# Patient Record
Sex: MALE | Race: WHITE | Employment: OTHER | URBAN - METROPOLITAN AREA
[De-identification: names, ages, dates, MRNs, and addresses within clinical notes are randomized per-mention and may not be internally consistent; named-entity substitution may affect disease eponyms.]

---

## 2017-01-23 RX ORDER — TIZANIDINE 4 MG/1
4 TABLET ORAL EVERY 8 HOURS PRN
Qty: 30 TABLET | Refills: 1 | Status: SHIPPED | OUTPATIENT
Start: 2017-01-23 | End: 2017-05-25 | Stop reason: SDUPTHER

## 2017-04-03 RX ORDER — MELOXICAM 15 MG/1
15 TABLET ORAL DAILY
Qty: 30 TABLET | Refills: 3 | Status: SHIPPED | OUTPATIENT
Start: 2017-04-03 | End: 2017-05-25 | Stop reason: SDUPTHER

## 2017-05-25 ENCOUNTER — OFFICE VISIT (OUTPATIENT)
Dept: FAMILY MEDICINE CLINIC | Age: 52
End: 2017-05-25

## 2017-05-25 VITALS
RESPIRATION RATE: 16 BRPM | TEMPERATURE: 98.6 F | HEIGHT: 69 IN | DIASTOLIC BLOOD PRESSURE: 78 MMHG | HEART RATE: 72 BPM | BODY MASS INDEX: 32.29 KG/M2 | WEIGHT: 218 LBS | SYSTOLIC BLOOD PRESSURE: 140 MMHG

## 2017-05-25 DIAGNOSIS — L50.9 URTICARIA: ICD-10-CM

## 2017-05-25 DIAGNOSIS — M25.562 CHRONIC PAIN OF LEFT KNEE: ICD-10-CM

## 2017-05-25 DIAGNOSIS — S83.206S: ICD-10-CM

## 2017-05-25 DIAGNOSIS — Z01.818 PREOP EXAMINATION: Primary | ICD-10-CM

## 2017-05-25 DIAGNOSIS — K21.9 GASTROESOPHAGEAL REFLUX DISEASE, ESOPHAGITIS PRESENCE NOT SPECIFIED: ICD-10-CM

## 2017-05-25 DIAGNOSIS — E78.2 MIXED HYPERLIPIDEMIA: Chronic | ICD-10-CM

## 2017-05-25 DIAGNOSIS — M54.2 CERVICAL PAIN (NECK): ICD-10-CM

## 2017-05-25 DIAGNOSIS — J30.2 SEASONAL ALLERGIC RHINITIS, UNSPECIFIED ALLERGIC RHINITIS TRIGGER: ICD-10-CM

## 2017-05-25 DIAGNOSIS — I10 ESSENTIAL HYPERTENSION: Chronic | ICD-10-CM

## 2017-05-25 DIAGNOSIS — G89.29 CHRONIC PAIN OF LEFT KNEE: ICD-10-CM

## 2017-05-25 PROCEDURE — 99214 OFFICE O/P EST MOD 30 MIN: CPT | Performed by: NURSE PRACTITIONER

## 2017-05-25 RX ORDER — METHYLPREDNISOLONE ACETATE 80 MG/ML
120 INJECTION, SUSPENSION INTRA-ARTICULAR; INTRALESIONAL; INTRAMUSCULAR; SOFT TISSUE ONCE
Status: COMPLETED | OUTPATIENT
Start: 2017-05-25 | End: 2017-05-25

## 2017-05-25 RX ORDER — CETIRIZINE HYDROCHLORIDE 10 MG/1
10 TABLET ORAL DAILY
Qty: 30 TABLET | Refills: 3 | Status: SHIPPED | OUTPATIENT
Start: 2017-05-25 | End: 2019-03-25 | Stop reason: SDUPTHER

## 2017-05-25 RX ORDER — MELOXICAM 15 MG/1
15 TABLET ORAL DAILY
Qty: 90 TABLET | Refills: 3 | Status: ON HOLD | OUTPATIENT
Start: 2017-05-25 | End: 2018-06-17 | Stop reason: HOSPADM

## 2017-05-25 RX ORDER — OMEPRAZOLE 40 MG/1
40 CAPSULE, DELAYED RELEASE ORAL DAILY
Qty: 90 CAPSULE | Refills: 3 | Status: SHIPPED | OUTPATIENT
Start: 2017-05-25 | End: 2019-04-08 | Stop reason: SDUPTHER

## 2017-05-25 RX ORDER — LOSARTAN POTASSIUM 100 MG/1
TABLET ORAL
Qty: 90 TABLET | Refills: 3 | Status: SHIPPED | OUTPATIENT
Start: 2017-05-25 | End: 2018-06-07 | Stop reason: SDUPTHER

## 2017-05-25 RX ORDER — TIZANIDINE 4 MG/1
4 TABLET ORAL EVERY 8 HOURS PRN
Qty: 60 TABLET | Refills: 1 | Status: SHIPPED | OUTPATIENT
Start: 2017-05-25 | End: 2017-08-14

## 2017-05-25 RX ADMIN — METHYLPREDNISOLONE ACETATE 120 MG: 80 INJECTION, SUSPENSION INTRA-ARTICULAR; INTRALESIONAL; INTRAMUSCULAR; SOFT TISSUE at 10:46

## 2017-05-25 ASSESSMENT — PATIENT HEALTH QUESTIONNAIRE - PHQ9
2. FEELING DOWN, DEPRESSED OR HOPELESS: 0
SUM OF ALL RESPONSES TO PHQ QUESTIONS 1-9: 0
SUM OF ALL RESPONSES TO PHQ9 QUESTIONS 1 & 2: 0
1. LITTLE INTEREST OR PLEASURE IN DOING THINGS: 0

## 2017-06-02 ASSESSMENT — ENCOUNTER SYMPTOMS
RHINORRHEA: 1
SORE THROAT: 1
GASTROINTESTINAL NEGATIVE: 1
RESPIRATORY NEGATIVE: 1
EYE ITCHING: 1

## 2017-06-20 ENCOUNTER — TELEPHONE (OUTPATIENT)
Dept: FAMILY MEDICINE CLINIC | Age: 52
End: 2017-06-20

## 2017-06-20 RX ORDER — METHYLPREDNISOLONE 4 MG/1
TABLET ORAL
Qty: 1 KIT | Refills: 0 | Status: SHIPPED | OUTPATIENT
Start: 2017-06-20 | End: 2017-06-20 | Stop reason: SDUPTHER

## 2017-06-20 RX ORDER — METHYLPREDNISOLONE 4 MG/1
TABLET ORAL
Qty: 1 KIT | Refills: 0 | Status: SHIPPED | OUTPATIENT
Start: 2017-06-20 | End: 2017-06-26

## 2017-07-05 ENCOUNTER — OFFICE VISIT (OUTPATIENT)
Dept: FAMILY MEDICINE CLINIC | Age: 52
End: 2017-07-05

## 2017-07-05 VITALS
HEART RATE: 76 BPM | SYSTOLIC BLOOD PRESSURE: 140 MMHG | DIASTOLIC BLOOD PRESSURE: 78 MMHG | WEIGHT: 223 LBS | RESPIRATION RATE: 16 BRPM | BODY MASS INDEX: 32 KG/M2

## 2017-07-05 DIAGNOSIS — M54.16 LUMBAR RADICULOPATHY: Primary | ICD-10-CM

## 2017-07-05 DIAGNOSIS — M54.31 SCIATICA, RIGHT SIDE: ICD-10-CM

## 2017-07-05 PROCEDURE — 99213 OFFICE O/P EST LOW 20 MIN: CPT | Performed by: NURSE PRACTITIONER

## 2017-07-05 RX ORDER — KETOROLAC TROMETHAMINE 30 MG/ML
60 INJECTION, SOLUTION INTRAMUSCULAR; INTRAVENOUS ONCE
Status: COMPLETED | OUTPATIENT
Start: 2017-07-05 | End: 2017-07-05

## 2017-07-05 RX ORDER — METHYLPREDNISOLONE ACETATE 80 MG/ML
80 INJECTION, SUSPENSION INTRA-ARTICULAR; INTRALESIONAL; INTRAMUSCULAR; SOFT TISSUE ONCE
Status: COMPLETED | OUTPATIENT
Start: 2017-07-05 | End: 2017-07-05

## 2017-07-05 RX ADMIN — KETOROLAC TROMETHAMINE 60 MG: 30 INJECTION, SOLUTION INTRAMUSCULAR; INTRAVENOUS at 11:45

## 2017-07-05 RX ADMIN — METHYLPREDNISOLONE ACETATE 80 MG: 80 INJECTION, SUSPENSION INTRA-ARTICULAR; INTRALESIONAL; INTRAMUSCULAR; SOFT TISSUE at 11:45

## 2017-07-06 ENCOUNTER — TELEPHONE (OUTPATIENT)
Dept: FAMILY MEDICINE CLINIC | Age: 52
End: 2017-07-06

## 2017-07-06 RX ORDER — TRAMADOL HYDROCHLORIDE 50 MG/1
50 TABLET ORAL EVERY 6 HOURS PRN
Qty: 60 TABLET | Refills: 0 | Status: SHIPPED | OUTPATIENT
Start: 2017-07-06 | End: 2017-07-19 | Stop reason: SDUPTHER

## 2017-07-06 ASSESSMENT — ENCOUNTER SYMPTOMS
RESPIRATORY NEGATIVE: 1
BACK PAIN: 1
GASTROINTESTINAL NEGATIVE: 1
EYES NEGATIVE: 1

## 2017-07-19 ENCOUNTER — TELEPHONE (OUTPATIENT)
Dept: FAMILY MEDICINE CLINIC | Age: 52
End: 2017-07-19

## 2017-07-20 RX ORDER — TRAMADOL HYDROCHLORIDE 50 MG/1
50 TABLET ORAL EVERY 6 HOURS PRN
Qty: 60 TABLET | Refills: 0 | Status: SHIPPED | OUTPATIENT
Start: 2017-07-20 | End: 2017-08-11 | Stop reason: SDUPTHER

## 2017-07-21 ENCOUNTER — PROCEDURE VISIT (OUTPATIENT)
Dept: PHYSICAL MEDICINE AND REHAB | Age: 52
End: 2017-07-21
Payer: COMMERCIAL

## 2017-07-21 DIAGNOSIS — M54.9 BACK PAIN WITH SCIATICA: ICD-10-CM

## 2017-07-21 DIAGNOSIS — M54.30 BACK PAIN WITH SCIATICA: ICD-10-CM

## 2017-07-21 DIAGNOSIS — M54.16 LUMBAR RADICULOPATHY: Primary | ICD-10-CM

## 2017-07-21 DIAGNOSIS — R20.0 BILATERAL LEG NUMBNESS: ICD-10-CM

## 2017-07-21 PROCEDURE — 95910 NRV CNDJ TEST 7-8 STUDIES: CPT | Performed by: PSYCHIATRY & NEUROLOGY

## 2017-07-21 PROCEDURE — 95886 MUSC TEST DONE W/N TEST COMP: CPT | Performed by: PSYCHIATRY & NEUROLOGY

## 2017-07-27 ENCOUNTER — HOSPITAL ENCOUNTER (EMERGENCY)
Age: 52
Discharge: HOME | End: 2017-07-27
Payer: COMMERCIAL

## 2017-07-27 VITALS
HEART RATE: 71 BPM | DIASTOLIC BLOOD PRESSURE: 89 MMHG | BODY MASS INDEX: 32.21 KG/M2 | SYSTOLIC BLOOD PRESSURE: 138 MMHG | RESPIRATION RATE: 16 BRPM | TEMPERATURE: 97.9 F | WEIGHT: 225 LBS | OXYGEN SATURATION: 97 % | HEIGHT: 70 IN

## 2017-07-27 DIAGNOSIS — M54.16 RADICULOPATHY OF LUMBAR REGION: Primary | ICD-10-CM

## 2017-07-27 PROCEDURE — 99212 OFFICE O/P EST SF 10 MIN: CPT

## 2017-07-27 PROCEDURE — 99213 OFFICE O/P EST LOW 20 MIN: CPT | Performed by: NURSE PRACTITIONER

## 2017-07-27 PROCEDURE — 6360000002 HC RX W HCPCS: Performed by: NURSE PRACTITIONER

## 2017-07-27 PROCEDURE — 96372 THER/PROPH/DIAG INJ SC/IM: CPT

## 2017-07-27 RX ORDER — PREDNISONE 10 MG/1
TABLET ORAL
Qty: 21 TABLET | Refills: 0 | Status: SHIPPED | OUTPATIENT
Start: 2017-07-27 | End: 2017-08-07 | Stop reason: ALTCHOICE

## 2017-07-27 RX ORDER — METHYLPREDNISOLONE SODIUM SUCCINATE 125 MG/2ML
125 INJECTION, POWDER, LYOPHILIZED, FOR SOLUTION INTRAMUSCULAR; INTRAVENOUS ONCE
Status: COMPLETED | OUTPATIENT
Start: 2017-07-27 | End: 2017-07-27

## 2017-07-27 RX ORDER — METHYLPREDNISOLONE ACETATE 80 MG/ML
120 INJECTION, SUSPENSION INTRA-ARTICULAR; INTRALESIONAL; INTRAMUSCULAR; SOFT TISSUE ONCE
Status: DISCONTINUED | OUTPATIENT
Start: 2017-07-27 | End: 2017-07-27

## 2017-07-27 RX ADMIN — METHYLPREDNISOLONE SODIUM SUCCINATE 125 MG: 125 INJECTION, POWDER, FOR SOLUTION INTRAMUSCULAR; INTRAVENOUS at 17:49

## 2017-07-27 ASSESSMENT — PAIN SCALES - GENERAL
PAINLEVEL_OUTOF10: 10
PAINLEVEL_OUTOF10: 8

## 2017-07-27 ASSESSMENT — PAIN DESCRIPTION - ORIENTATION: ORIENTATION: RIGHT;LOWER

## 2017-07-27 ASSESSMENT — PAIN DESCRIPTION - ONSET: ONSET: GRADUAL

## 2017-07-27 ASSESSMENT — PAIN DESCRIPTION - LOCATION: LOCATION: BACK

## 2017-07-27 ASSESSMENT — PAIN DESCRIPTION - FREQUENCY: FREQUENCY: CONTINUOUS

## 2017-07-27 ASSESSMENT — PAIN DESCRIPTION - DESCRIPTORS: DESCRIPTORS: SHARP;ACHING

## 2017-07-27 ASSESSMENT — PAIN DESCRIPTION - PROGRESSION: CLINICAL_PROGRESSION: GRADUALLY WORSENING

## 2017-07-27 ASSESSMENT — ENCOUNTER SYMPTOMS
BACK PAIN: 1
ABDOMINAL SWELLING: 0
BOWEL INCONTINENCE: 0
ABDOMINAL PAIN: 0

## 2017-07-27 ASSESSMENT — PAIN DESCRIPTION - PAIN TYPE: TYPE: ACUTE PAIN

## 2017-08-07 ENCOUNTER — HOSPITAL ENCOUNTER (EMERGENCY)
Age: 52
Discharge: HOME | End: 2017-08-07
Payer: COMMERCIAL

## 2017-08-07 VITALS
HEIGHT: 70 IN | HEART RATE: 72 BPM | WEIGHT: 230 LBS | BODY MASS INDEX: 32.93 KG/M2 | SYSTOLIC BLOOD PRESSURE: 153 MMHG | TEMPERATURE: 97.6 F | DIASTOLIC BLOOD PRESSURE: 95 MMHG | RESPIRATION RATE: 16 BRPM | OXYGEN SATURATION: 97 %

## 2017-08-07 DIAGNOSIS — M54.16 RIGHT LUMBAR RADICULOPATHY: Primary | ICD-10-CM

## 2017-08-07 PROCEDURE — 99212 OFFICE O/P EST SF 10 MIN: CPT

## 2017-08-07 PROCEDURE — 99213 OFFICE O/P EST LOW 20 MIN: CPT | Performed by: NURSE PRACTITIONER

## 2017-08-07 PROCEDURE — 96372 THER/PROPH/DIAG INJ SC/IM: CPT

## 2017-08-07 PROCEDURE — 6360000002 HC RX W HCPCS: Performed by: NURSE PRACTITIONER

## 2017-08-07 RX ORDER — METHYLPREDNISOLONE SODIUM SUCCINATE 125 MG/2ML
125 INJECTION, POWDER, LYOPHILIZED, FOR SOLUTION INTRAMUSCULAR; INTRAVENOUS ONCE
Status: COMPLETED | OUTPATIENT
Start: 2017-08-07 | End: 2017-08-07

## 2017-08-07 RX ADMIN — METHYLPREDNISOLONE SODIUM SUCCINATE 125 MG: 125 INJECTION, POWDER, FOR SOLUTION INTRAMUSCULAR; INTRAVENOUS at 15:54

## 2017-08-07 ASSESSMENT — ENCOUNTER SYMPTOMS
BACK PAIN: 1
COLOR CHANGE: 0
SHORTNESS OF BREATH: 0
VOMITING: 0

## 2017-08-07 ASSESSMENT — PAIN DESCRIPTION - PROGRESSION: CLINICAL_PROGRESSION: GRADUALLY WORSENING

## 2017-08-07 ASSESSMENT — PAIN DESCRIPTION - ORIENTATION: ORIENTATION: RIGHT

## 2017-08-07 ASSESSMENT — PAIN DESCRIPTION - PAIN TYPE: TYPE: ACUTE PAIN

## 2017-08-07 ASSESSMENT — PAIN DESCRIPTION - DESCRIPTORS: DESCRIPTORS: ACHING

## 2017-08-07 ASSESSMENT — PAIN SCALES - GENERAL: PAINLEVEL_OUTOF10: 8

## 2017-08-07 ASSESSMENT — PAIN DESCRIPTION - FREQUENCY: FREQUENCY: CONTINUOUS

## 2017-08-07 ASSESSMENT — PAIN DESCRIPTION - LOCATION: LOCATION: BACK;LEG

## 2017-08-11 RX ORDER — TRAMADOL HYDROCHLORIDE 50 MG/1
50 TABLET ORAL EVERY 6 HOURS PRN
Qty: 60 TABLET | Refills: 0 | Status: SHIPPED | OUTPATIENT
Start: 2017-08-11 | End: 2017-08-14

## 2017-08-14 ENCOUNTER — HOSPITAL ENCOUNTER (OUTPATIENT)
Dept: INTERVENTIONAL RADIOLOGY/VASCULAR | Age: 52
Discharge: HOME | End: 2017-08-14
Payer: COMMERCIAL

## 2017-08-14 VITALS
SYSTOLIC BLOOD PRESSURE: 153 MMHG | RESPIRATION RATE: 16 BRPM | HEART RATE: 70 BPM | OXYGEN SATURATION: 94 % | TEMPERATURE: 98.2 F | DIASTOLIC BLOOD PRESSURE: 115 MMHG

## 2017-08-14 PROCEDURE — 2500000003 HC RX 250 WO HCPCS

## 2017-08-14 PROCEDURE — 6360000004 HC RX CONTRAST MEDICATION: Performed by: RADIOLOGY

## 2017-08-14 PROCEDURE — 6360000002 HC RX W HCPCS

## 2017-08-14 PROCEDURE — 62323 NJX INTERLAMINAR LMBR/SAC: CPT

## 2017-08-14 RX ORDER — METHYLPREDNISOLONE ACETATE 80 MG/ML
80 INJECTION, SUSPENSION INTRA-ARTICULAR; INTRALESIONAL; INTRAMUSCULAR; SOFT TISSUE ONCE
Status: COMPLETED | OUTPATIENT
Start: 2017-08-14 | End: 2017-08-14

## 2017-08-14 RX ORDER — BUPIVACAINE HYDROCHLORIDE 2.5 MG/ML
5 INJECTION, SOLUTION EPIDURAL; INFILTRATION; INTRACAUDAL ONCE
Status: COMPLETED | OUTPATIENT
Start: 2017-08-14 | End: 2017-08-14

## 2017-08-14 RX ADMIN — BUPIVACAINE HYDROCHLORIDE 2 ML: 2.5 INJECTION, SOLUTION EPIDURAL; INFILTRATION; INTRACAUDAL at 15:11

## 2017-08-14 RX ADMIN — IOHEXOL 1 ML: 180 INJECTION INTRAVENOUS at 15:11

## 2017-08-14 RX ADMIN — METHYLPREDNISOLONE ACETATE 80 MG: 80 INJECTION, SUSPENSION INTRA-ARTICULAR; INTRALESIONAL; INTRAMUSCULAR; SOFT TISSUE at 15:12

## 2017-08-14 ASSESSMENT — PAIN DESCRIPTION - LOCATION: LOCATION: HIP;LEG;BUTTOCKS

## 2017-08-14 ASSESSMENT — PAIN DESCRIPTION - ORIENTATION
ORIENTATION: RIGHT

## 2017-08-14 ASSESSMENT — PAIN DESCRIPTION - DESCRIPTORS
DESCRIPTORS: ACHING
DESCRIPTORS: ACHING
DESCRIPTORS: THROBBING;SHARP
DESCRIPTORS: ACHING
DESCRIPTORS: THROBBING

## 2017-08-14 ASSESSMENT — PAIN SCALES - GENERAL
PAINLEVEL_OUTOF10: 10
PAINLEVEL_OUTOF10: 10
PAINLEVEL_OUTOF10: 8
PAINLEVEL_OUTOF10: 10
PAINLEVEL_OUTOF10: 10
PAINLEVEL_OUTOF10: 9

## 2017-08-14 ASSESSMENT — PAIN DESCRIPTION - FREQUENCY
FREQUENCY: CONTINUOUS

## 2017-08-14 ASSESSMENT — PAIN DESCRIPTION - PAIN TYPE
TYPE: CHRONIC PAIN

## 2017-09-11 RX ORDER — TRAMADOL HYDROCHLORIDE 50 MG/1
50 TABLET ORAL EVERY 6 HOURS PRN
Qty: 60 TABLET | Refills: 0 | OUTPATIENT
Start: 2017-09-11 | End: 2017-10-11

## 2017-09-13 ENCOUNTER — HOSPITAL ENCOUNTER (EMERGENCY)
Age: 52
Discharge: HOME | End: 2017-09-13
Attending: NURSE PRACTITIONER
Payer: COMMERCIAL

## 2017-09-13 VITALS
TEMPERATURE: 98.3 F | HEIGHT: 70 IN | SYSTOLIC BLOOD PRESSURE: 146 MMHG | BODY MASS INDEX: 30.78 KG/M2 | RESPIRATION RATE: 16 BRPM | DIASTOLIC BLOOD PRESSURE: 86 MMHG | WEIGHT: 215 LBS | HEART RATE: 96 BPM | OXYGEN SATURATION: 98 %

## 2017-09-13 DIAGNOSIS — M54.16 LUMBAR BACK PAIN WITH RADICULOPATHY AFFECTING RIGHT LOWER EXTREMITY: Primary | ICD-10-CM

## 2017-09-13 PROCEDURE — 6360000002 HC RX W HCPCS: Performed by: NURSE PRACTITIONER

## 2017-09-13 PROCEDURE — 96372 THER/PROPH/DIAG INJ SC/IM: CPT

## 2017-09-13 PROCEDURE — 99213 OFFICE O/P EST LOW 20 MIN: CPT | Performed by: NURSE PRACTITIONER

## 2017-09-13 PROCEDURE — 99212 OFFICE O/P EST SF 10 MIN: CPT

## 2017-09-13 RX ORDER — METHYLPREDNISOLONE ACETATE 80 MG/ML
80 INJECTION, SUSPENSION INTRA-ARTICULAR; INTRALESIONAL; INTRAMUSCULAR; SOFT TISSUE ONCE
Status: COMPLETED | OUTPATIENT
Start: 2017-09-13 | End: 2017-09-13

## 2017-09-13 RX ADMIN — METHYLPREDNISOLONE ACETATE 80 MG: 80 INJECTION, SUSPENSION INTRA-ARTICULAR; INTRALESIONAL; INTRAMUSCULAR; SOFT TISSUE at 17:38

## 2017-09-13 ASSESSMENT — PAIN DESCRIPTION - PROGRESSION: CLINICAL_PROGRESSION: GRADUALLY WORSENING

## 2017-09-13 ASSESSMENT — PAIN DESCRIPTION - DESCRIPTORS: DESCRIPTORS: ACHING;BURNING

## 2017-09-13 ASSESSMENT — PAIN DESCRIPTION - ORIENTATION: ORIENTATION: RIGHT;LOWER;POSTERIOR

## 2017-09-13 ASSESSMENT — PAIN SCALES - GENERAL: PAINLEVEL_OUTOF10: 9

## 2017-09-13 ASSESSMENT — ENCOUNTER SYMPTOMS: BACK PAIN: 1

## 2017-09-13 ASSESSMENT — PAIN DESCRIPTION - PAIN TYPE: TYPE: CHRONIC PAIN

## 2017-09-13 ASSESSMENT — PAIN DESCRIPTION - LOCATION: LOCATION: BACK;LEG

## 2017-09-13 ASSESSMENT — PAIN DESCRIPTION - ONSET: ONSET: GRADUAL

## 2017-09-13 ASSESSMENT — PAIN DESCRIPTION - FREQUENCY: FREQUENCY: CONTINUOUS

## 2017-10-26 ENCOUNTER — HOSPITAL ENCOUNTER (OUTPATIENT)
Dept: INTERVENTIONAL RADIOLOGY/VASCULAR | Age: 52
Discharge: HOME | End: 2017-10-26
Payer: COMMERCIAL

## 2017-10-26 ENCOUNTER — OFFICE VISIT (OUTPATIENT)
Dept: FAMILY MEDICINE CLINIC | Age: 52
End: 2017-10-26
Payer: COMMERCIAL

## 2017-10-26 VITALS
HEART RATE: 72 BPM | BODY MASS INDEX: 31.51 KG/M2 | DIASTOLIC BLOOD PRESSURE: 86 MMHG | RESPIRATION RATE: 20 BRPM | WEIGHT: 219.6 LBS | SYSTOLIC BLOOD PRESSURE: 134 MMHG | TEMPERATURE: 98.2 F

## 2017-10-26 VITALS
WEIGHT: 216.8 LBS | BODY MASS INDEX: 31.11 KG/M2 | SYSTOLIC BLOOD PRESSURE: 151 MMHG | TEMPERATURE: 97.9 F | RESPIRATION RATE: 18 BRPM | DIASTOLIC BLOOD PRESSURE: 97 MMHG | HEART RATE: 69 BPM

## 2017-10-26 DIAGNOSIS — J30.89 ENVIRONMENTAL AND SEASONAL ALLERGIES: Primary | ICD-10-CM

## 2017-10-26 PROCEDURE — 3017F COLORECTAL CA SCREEN DOC REV: CPT | Performed by: FAMILY MEDICINE

## 2017-10-26 PROCEDURE — 2500000003 HC RX 250 WO HCPCS

## 2017-10-26 PROCEDURE — 1036F TOBACCO NON-USER: CPT | Performed by: FAMILY MEDICINE

## 2017-10-26 PROCEDURE — G8427 DOCREV CUR MEDS BY ELIG CLIN: HCPCS | Performed by: FAMILY MEDICINE

## 2017-10-26 PROCEDURE — G8417 CALC BMI ABV UP PARAM F/U: HCPCS | Performed by: FAMILY MEDICINE

## 2017-10-26 PROCEDURE — 99213 OFFICE O/P EST LOW 20 MIN: CPT | Performed by: FAMILY MEDICINE

## 2017-10-26 PROCEDURE — 62323 NJX INTERLAMINAR LMBR/SAC: CPT | Performed by: RADIOLOGY

## 2017-10-26 PROCEDURE — 6360000004 HC RX CONTRAST MEDICATION: Performed by: RADIOLOGY

## 2017-10-26 PROCEDURE — 6360000002 HC RX W HCPCS

## 2017-10-26 PROCEDURE — G8484 FLU IMMUNIZE NO ADMIN: HCPCS | Performed by: FAMILY MEDICINE

## 2017-10-26 RX ORDER — BUPIVACAINE HYDROCHLORIDE 2.5 MG/ML
5 INJECTION, SOLUTION EPIDURAL; INFILTRATION; INTRACAUDAL ONCE
Status: COMPLETED | OUTPATIENT
Start: 2017-10-26 | End: 2017-10-26

## 2017-10-26 RX ORDER — METHYLPREDNISOLONE ACETATE 80 MG/ML
160 INJECTION, SUSPENSION INTRA-ARTICULAR; INTRALESIONAL; INTRAMUSCULAR; SOFT TISSUE ONCE
Status: COMPLETED | OUTPATIENT
Start: 2017-10-26 | End: 2017-10-26

## 2017-10-26 RX ORDER — METHYLPREDNISOLONE ACETATE 80 MG/ML
80 INJECTION, SUSPENSION INTRA-ARTICULAR; INTRALESIONAL; INTRAMUSCULAR; SOFT TISSUE ONCE
Status: COMPLETED | OUTPATIENT
Start: 2017-10-26 | End: 2017-10-26

## 2017-10-26 RX ADMIN — IOHEXOL 1 ML: 180 INJECTION INTRAVENOUS at 14:26

## 2017-10-26 RX ADMIN — BUPIVACAINE HYDROCHLORIDE 2 ML: 2.5 INJECTION, SOLUTION EPIDURAL; INFILTRATION; INTRACAUDAL at 14:34

## 2017-10-26 RX ADMIN — METHYLPREDNISOLONE ACETATE 80 MG: 80 INJECTION, SUSPENSION INTRA-ARTICULAR; INTRALESIONAL; INTRAMUSCULAR; SOFT TISSUE at 14:34

## 2017-10-26 RX ADMIN — METHYLPREDNISOLONE ACETATE 160 MG: 80 INJECTION, SUSPENSION INTRA-ARTICULAR; INTRALESIONAL; INTRAMUSCULAR; SOFT TISSUE at 11:37

## 2017-10-26 ASSESSMENT — PAIN DESCRIPTION - DESCRIPTORS
DESCRIPTORS: ACHING;SHOOTING;SHARP
DESCRIPTORS: ACHING;SHOOTING
DESCRIPTORS: ACHING;SHOOTING;SHARP

## 2017-10-26 ASSESSMENT — PAIN SCALES - GENERAL
PAINLEVEL_OUTOF10: 0
PAINLEVEL_OUTOF10: 7
PAINLEVEL_OUTOF10: 5
PAINLEVEL_OUTOF10: 7
PAINLEVEL_OUTOF10: 5
PAINLEVEL_OUTOF10: 0

## 2017-10-26 ASSESSMENT — PAIN DESCRIPTION - ORIENTATION
ORIENTATION: RIGHT
ORIENTATION: RIGHT

## 2017-10-26 ASSESSMENT — PAIN DESCRIPTION - LOCATION
LOCATION: BUTTOCKS;BACK;LEG
LOCATION: LEG;BUTTOCKS;BACK

## 2017-10-26 ASSESSMENT — PAIN DESCRIPTION - FREQUENCY: FREQUENCY: INTERMITTENT

## 2017-10-26 ASSESSMENT — PAIN - FUNCTIONAL ASSESSMENT: PAIN_FUNCTIONAL_ASSESSMENT: 0-10

## 2017-10-26 ASSESSMENT — PAIN DESCRIPTION - PAIN TYPE
TYPE: CHRONIC PAIN
TYPE: CHRONIC PAIN

## 2017-10-26 NOTE — PROGRESS NOTES
2:46 PM PT RETURNS FROM X-RAY-BAND AID DRY AND INTACT TO LOWER BACK AREA-NEURO STATUS AS BEFORE  2:46 PM LUNCH OFFERED

## 2017-10-29 ASSESSMENT — ENCOUNTER SYMPTOMS
ABDOMINAL PAIN: 0
EYE ITCHING: 1
RHINORRHEA: 1
SHORTNESS OF BREATH: 0
COUGH: 0
EYE DISCHARGE: 1
EYE PAIN: 0
ABDOMINAL DISTENTION: 0
SORE THROAT: 0
SINUS PRESSURE: 0
CONSTIPATION: 0
DIARRHEA: 0
NAUSEA: 0

## 2018-01-12 ENCOUNTER — TELEPHONE (OUTPATIENT)
Dept: FAMILY MEDICINE CLINIC | Age: 53
End: 2018-01-12

## 2018-01-12 ENCOUNTER — OFFICE VISIT (OUTPATIENT)
Dept: FAMILY MEDICINE CLINIC | Age: 53
End: 2018-01-12
Payer: COMMERCIAL

## 2018-01-12 VITALS
RESPIRATION RATE: 20 BRPM | HEART RATE: 76 BPM | WEIGHT: 212 LBS | DIASTOLIC BLOOD PRESSURE: 88 MMHG | BODY MASS INDEX: 30.35 KG/M2 | HEIGHT: 70 IN | SYSTOLIC BLOOD PRESSURE: 136 MMHG | TEMPERATURE: 97.9 F

## 2018-01-12 DIAGNOSIS — R63.4 WEIGHT LOSS: ICD-10-CM

## 2018-01-12 DIAGNOSIS — I10 ESSENTIAL HYPERTENSION: Chronic | ICD-10-CM

## 2018-01-12 DIAGNOSIS — E78.2 MIXED HYPERLIPIDEMIA: Chronic | ICD-10-CM

## 2018-01-12 DIAGNOSIS — R42 POSTURAL DIZZINESS WITH NEAR SYNCOPE: ICD-10-CM

## 2018-01-12 DIAGNOSIS — R55 POSTURAL DIZZINESS WITH NEAR SYNCOPE: ICD-10-CM

## 2018-01-12 DIAGNOSIS — M54.16 LUMBAR RADICULOPATHY: Primary | ICD-10-CM

## 2018-01-12 DIAGNOSIS — Z12.5 SCREENING FOR PROSTATE CANCER: ICD-10-CM

## 2018-01-12 PROCEDURE — 3017F COLORECTAL CA SCREEN DOC REV: CPT | Performed by: NURSE PRACTITIONER

## 2018-01-12 PROCEDURE — 1036F TOBACCO NON-USER: CPT | Performed by: NURSE PRACTITIONER

## 2018-01-12 PROCEDURE — G8427 DOCREV CUR MEDS BY ELIG CLIN: HCPCS | Performed by: NURSE PRACTITIONER

## 2018-01-12 PROCEDURE — G8417 CALC BMI ABV UP PARAM F/U: HCPCS | Performed by: NURSE PRACTITIONER

## 2018-01-12 PROCEDURE — G8484 FLU IMMUNIZE NO ADMIN: HCPCS | Performed by: NURSE PRACTITIONER

## 2018-01-12 PROCEDURE — 99214 OFFICE O/P EST MOD 30 MIN: CPT | Performed by: NURSE PRACTITIONER

## 2018-01-12 RX ORDER — METOPROLOL TARTRATE 50 MG/1
50 TABLET, FILM COATED ORAL 2 TIMES DAILY
Qty: 60 TABLET | Refills: 3 | Status: SHIPPED | OUTPATIENT
Start: 2018-01-12 | End: 2018-04-30 | Stop reason: SDUPTHER

## 2018-01-12 RX ORDER — METHYLPREDNISOLONE ACETATE 80 MG/ML
160 INJECTION, SUSPENSION INTRA-ARTICULAR; INTRALESIONAL; INTRAMUSCULAR; SOFT TISSUE ONCE
Status: COMPLETED | OUTPATIENT
Start: 2018-01-12 | End: 2018-01-12

## 2018-01-12 RX ADMIN — METHYLPREDNISOLONE ACETATE 160 MG: 80 INJECTION, SUSPENSION INTRA-ARTICULAR; INTRALESIONAL; INTRAMUSCULAR; SOFT TISSUE at 12:22

## 2018-01-12 NOTE — PROGRESS NOTES
After obtaining consent, and per orders of De Hattie, injection of Depo medrol 160 mg was given in right glut IM by Anish Rivera. Patient instructed to report any adverse reaction to me immediately.

## 2018-01-12 NOTE — TELEPHONE ENCOUNTER
Carotid u/s will be at [facility] on 1-19-18 at 6:00pm , be there at 5:30 pm on second floor heart center and patient was informed.

## 2018-01-15 ENCOUNTER — HOSPITAL ENCOUNTER (OUTPATIENT)
Age: 53
Discharge: HOME | End: 2018-01-15
Payer: COMMERCIAL

## 2018-01-15 DIAGNOSIS — E78.2 MIXED HYPERLIPIDEMIA: Chronic | ICD-10-CM

## 2018-01-15 DIAGNOSIS — Z12.5 SCREENING FOR PROSTATE CANCER: ICD-10-CM

## 2018-01-15 DIAGNOSIS — R63.4 WEIGHT LOSS: ICD-10-CM

## 2018-01-15 LAB
ALBUMIN SERPL-MCNC: 4.8 G/DL (ref 3.5–5.1)
ALP BLD-CCNC: 43 U/L (ref 38–126)
ALT SERPL-CCNC: 14 U/L (ref 11–66)
ANION GAP SERPL CALCULATED.3IONS-SCNC: 14 MEQ/L (ref 8–16)
ANISOCYTOSIS: ABNORMAL
AST SERPL-CCNC: 15 U/L (ref 5–40)
BASOPHILS # BLD: 0.7 %
BASOPHILS ABSOLUTE: 0.1 THOU/MM3 (ref 0–0.1)
BILIRUB SERPL-MCNC: 1.2 MG/DL (ref 0.3–1.2)
BUN BLDV-MCNC: 29 MG/DL (ref 7–22)
CALCIUM SERPL-MCNC: 9.1 MG/DL (ref 8.5–10.5)
CHLORIDE BLD-SCNC: 102 MEQ/L (ref 98–111)
CHOLESTEROL, TOTAL: 238 MG/DL (ref 100–199)
CO2: 25 MEQ/L (ref 23–33)
CREAT SERPL-MCNC: 1.1 MG/DL (ref 0.4–1.2)
EOSINOPHIL # BLD: 1.4 %
EOSINOPHILS ABSOLUTE: 0.1 THOU/MM3 (ref 0–0.4)
GFR SERPL CREATININE-BSD FRML MDRD: 70 ML/MIN/1.73M2
GLUCOSE BLD-MCNC: 93 MG/DL (ref 70–108)
HCT VFR BLD CALC: 40.8 % (ref 42–52)
HDLC SERPL-MCNC: 78 MG/DL
HEMOGLOBIN: 13.5 GM/DL (ref 14–18)
LDL CHOLESTEROL CALCULATED: 146 MG/DL
LYMPHOCYTES # BLD: 24.9 %
LYMPHOCYTES ABSOLUTE: 1.8 THOU/MM3 (ref 1–4.8)
MCH RBC QN AUTO: 29 PG (ref 27–31)
MCHC RBC AUTO-ENTMCNC: 33.1 GM/DL (ref 33–37)
MCV RBC AUTO: 87.5 FL (ref 80–94)
MONOCYTES # BLD: 7.1 %
MONOCYTES ABSOLUTE: 0.5 THOU/MM3 (ref 0.4–1.3)
NUCLEATED RED BLOOD CELLS: 0 /100 WBC
PDW BLD-RTO: 15.3 % (ref 11.5–14.5)
PLATELET # BLD: 428 THOU/MM3 (ref 130–400)
PMV BLD AUTO: 7.6 MCM (ref 7.4–10.4)
POTASSIUM SERPL-SCNC: 4.2 MEQ/L (ref 3.5–5.2)
RBC # BLD: 4.66 MILL/MM3 (ref 4.7–6.1)
SEG NEUTROPHILS: 65.9 %
SEGMENTED NEUTROPHILS ABSOLUTE COUNT: 4.7 THOU/MM3 (ref 1.8–7.7)
SODIUM BLD-SCNC: 141 MEQ/L (ref 135–145)
T4 FREE: 1.1 NG/DL (ref 0.93–1.76)
TOTAL PROTEIN: 7 G/DL (ref 6.1–8)
TRIGL SERPL-MCNC: 72 MG/DL (ref 0–199)
TSH SERPL DL<=0.05 MIU/L-ACNC: 2.61 UIU/ML (ref 0.4–4.2)
WBC # BLD: 7.2 THOU/MM3 (ref 4.8–10.8)

## 2018-01-15 PROCEDURE — G0103 PSA SCREENING: HCPCS

## 2018-01-15 PROCEDURE — 84443 ASSAY THYROID STIM HORMONE: CPT

## 2018-01-15 PROCEDURE — 80061 LIPID PANEL: CPT

## 2018-01-15 PROCEDURE — 80053 COMPREHEN METABOLIC PANEL: CPT

## 2018-01-15 PROCEDURE — 85025 COMPLETE CBC W/AUTO DIFF WBC: CPT

## 2018-01-15 PROCEDURE — 86376 MICROSOMAL ANTIBODY EACH: CPT

## 2018-01-15 PROCEDURE — 84439 ASSAY OF FREE THYROXINE: CPT

## 2018-01-15 PROCEDURE — 36415 COLL VENOUS BLD VENIPUNCTURE: CPT

## 2018-01-16 LAB — PROSTATE SPECIFIC ANTIGEN: 0.63 NG/ML (ref 0–1)

## 2018-01-19 ENCOUNTER — HOSPITAL ENCOUNTER (OUTPATIENT)
Dept: INTERVENTIONAL RADIOLOGY/VASCULAR | Age: 53
Discharge: HOME | End: 2018-01-19
Payer: COMMERCIAL

## 2018-01-19 DIAGNOSIS — R55 POSTURAL DIZZINESS WITH NEAR SYNCOPE: ICD-10-CM

## 2018-01-19 DIAGNOSIS — R42 POSTURAL DIZZINESS WITH NEAR SYNCOPE: ICD-10-CM

## 2018-01-19 DIAGNOSIS — E78.2 MIXED HYPERLIPIDEMIA: Chronic | ICD-10-CM

## 2018-01-19 LAB — THYROID PEROXIDASE ANTIBODY: 1.3 IU/ML (ref 0–9)

## 2018-01-19 PROCEDURE — 93880 EXTRACRANIAL BILAT STUDY: CPT

## 2018-01-25 ASSESSMENT — ENCOUNTER SYMPTOMS
BACK PAIN: 1
GASTROINTESTINAL NEGATIVE: 1
ALLERGIC/IMMUNOLOGIC NEGATIVE: 1
EYES NEGATIVE: 1
RESPIRATORY NEGATIVE: 1

## 2018-01-25 NOTE — PROGRESS NOTES
[address]  [facility]  [facility]  Dept: [phone]  Dept Fax: [phone]: [phone]  PROGRESS NOTE      Visit Date: 1/25/2018    Kerrie Kumari is a 45 y.o. male who presents today for:  Chief Complaint   Patient presents with    Other     He just does not feel right. His son noticed one day that he was \"spaced out\" and his left hand was trembling. He does not remember this, he woke up from the episode and does not remember any of it. He just felt spaced out. He says he works hard at his job and this could contribute to the weight loss. He notices his head \"pulsating\" at times like his blood pressure could be a problem. Subjective:  C/o elevated bp, weight loss,, episodes of dizziness/ feeling spaced out. No loc/ syncope. No cp, sob, ab dpian. Denies ha. Hx lbp./ lumbar radiculopathy. Review of Systems   Constitutional: Positive for unexpected weight change. Eyes: Negative. Respiratory: Negative. Cardiovascular: Negative. Gastrointestinal: Negative. Endocrine: Negative. Genitourinary: Negative. Musculoskeletal: Positive for back pain. Skin: Negative. Allergic/Immunologic: Negative. Neurological: Positive for dizziness. Hematological: Negative. Psychiatric/Behavioral: Negative. LUMBAR SPINE WO CONTRAST       CLINICAL INFORMATION: Lumbar radiculopathy, Sciatica, right side. Right-sided radiculopathy for one month.       COMPARISON: No prior study.       TECHNIQUE: Sagittal and axial T1 and T2-weighted images were obtained through the lumbar spine.       FINDINGS:           The lumbar vertebral bodies are normally aligned.  There are mild scattered degenerative marrow changes. There is bone marrow edema associated with the facet joints at the L3-4 and L4-5 levels bilaterally.  There is some fluid in the facet joints.  There    are no compression fractures.  No pars defects are noted. Tennie Crease

## 2018-02-23 ENCOUNTER — HOSPITAL ENCOUNTER (EMERGENCY)
Age: 53
Discharge: HOME | End: 2018-02-23
Attending: NURSE PRACTITIONER
Payer: COMMERCIAL

## 2018-02-23 VITALS
BODY MASS INDEX: 31.5 KG/M2 | HEART RATE: 65 BPM | RESPIRATION RATE: 16 BRPM | SYSTOLIC BLOOD PRESSURE: 142 MMHG | HEIGHT: 70 IN | OXYGEN SATURATION: 97 % | WEIGHT: 220 LBS | TEMPERATURE: 98.5 F | DIASTOLIC BLOOD PRESSURE: 87 MMHG

## 2018-02-23 DIAGNOSIS — M54.31 RIGHT SIDED SCIATICA: Primary | ICD-10-CM

## 2018-02-23 PROCEDURE — 96372 THER/PROPH/DIAG INJ SC/IM: CPT

## 2018-02-23 PROCEDURE — 6360000002 HC RX W HCPCS: Performed by: NURSE PRACTITIONER

## 2018-02-23 PROCEDURE — 99213 OFFICE O/P EST LOW 20 MIN: CPT

## 2018-02-23 PROCEDURE — 99213 OFFICE O/P EST LOW 20 MIN: CPT | Performed by: NURSE PRACTITIONER

## 2018-02-23 RX ORDER — METHYLPREDNISOLONE ACETATE 80 MG/ML
80 INJECTION, SUSPENSION INTRA-ARTICULAR; INTRALESIONAL; INTRAMUSCULAR; SOFT TISSUE ONCE
Status: COMPLETED | OUTPATIENT
Start: 2018-02-23 | End: 2018-02-23

## 2018-02-23 RX ADMIN — METHYLPREDNISOLONE ACETATE 80 MG: 80 INJECTION, SUSPENSION INTRA-ARTICULAR; INTRALESIONAL; INTRAMUSCULAR; SOFT TISSUE at 19:33

## 2018-02-23 ASSESSMENT — PAIN DESCRIPTION - PAIN TYPE: TYPE: CHRONIC PAIN

## 2018-02-23 ASSESSMENT — ENCOUNTER SYMPTOMS: BACK PAIN: 1

## 2018-02-23 ASSESSMENT — PAIN DESCRIPTION - ORIENTATION: ORIENTATION: RIGHT;LOWER

## 2018-02-23 ASSESSMENT — PAIN SCALES - GENERAL
PAINLEVEL_OUTOF10: 9
PAINLEVEL_OUTOF10: 9

## 2018-02-23 ASSESSMENT — PAIN DESCRIPTION - ONSET: ONSET: GRADUAL

## 2018-02-23 ASSESSMENT — PAIN DESCRIPTION - DESCRIPTORS: DESCRIPTORS: ACHING

## 2018-02-23 ASSESSMENT — PAIN DESCRIPTION - PROGRESSION: CLINICAL_PROGRESSION: GRADUALLY WORSENING

## 2018-02-23 ASSESSMENT — PAIN DESCRIPTION - FREQUENCY: FREQUENCY: CONTINUOUS

## 2018-02-23 ASSESSMENT — PAIN DESCRIPTION - LOCATION: LOCATION: BACK;LEG

## 2018-02-24 NOTE — ED NOTES
Pt denies change in pain. Pt given discharge instructions, verbalizes understanding.       Aimee Sommers RN  02/23/18 0867

## 2018-03-26 ENCOUNTER — HOSPITAL ENCOUNTER (EMERGENCY)
Age: 53
Discharge: HOME | End: 2018-03-26
Payer: COMMERCIAL

## 2018-03-26 VITALS
HEIGHT: 70 IN | RESPIRATION RATE: 18 BRPM | TEMPERATURE: 98.4 F | DIASTOLIC BLOOD PRESSURE: 95 MMHG | HEART RATE: 64 BPM | BODY MASS INDEX: 31.5 KG/M2 | SYSTOLIC BLOOD PRESSURE: 161 MMHG | OXYGEN SATURATION: 97 % | WEIGHT: 220 LBS

## 2018-03-26 DIAGNOSIS — M54.10 BACK PAIN WITH RIGHT-SIDED RADICULOPATHY: Primary | ICD-10-CM

## 2018-03-26 PROCEDURE — 99212 OFFICE O/P EST SF 10 MIN: CPT

## 2018-03-26 PROCEDURE — 99213 OFFICE O/P EST LOW 20 MIN: CPT | Performed by: NURSE PRACTITIONER

## 2018-03-26 RX ORDER — PREDNISONE 20 MG/1
20 TABLET ORAL 2 TIMES DAILY
Qty: 10 TABLET | Refills: 0 | Status: SHIPPED | OUTPATIENT
Start: 2018-03-26 | End: 2018-03-31

## 2018-03-26 RX ORDER — TRAMADOL HYDROCHLORIDE 50 MG/1
50 TABLET ORAL EVERY 6 HOURS PRN
Qty: 12 TABLET | Refills: 0 | Status: SHIPPED | OUTPATIENT
Start: 2018-03-26 | End: 2018-03-29

## 2018-03-26 ASSESSMENT — ENCOUNTER SYMPTOMS
NAUSEA: 0
CONSTIPATION: 0
BACK PAIN: 1
SHORTNESS OF BREATH: 0
STRIDOR: 0
COLOR CHANGE: 0
VOMITING: 0
ABDOMINAL PAIN: 0
COUGH: 0
WHEEZING: 0
DIARRHEA: 0

## 2018-03-26 ASSESSMENT — PAIN DESCRIPTION - ORIENTATION: ORIENTATION: RIGHT;UPPER

## 2018-03-26 ASSESSMENT — PAIN DESCRIPTION - LOCATION: LOCATION: LEG

## 2018-03-26 ASSESSMENT — PAIN DESCRIPTION - PAIN TYPE: TYPE: CHRONIC PAIN

## 2018-03-26 ASSESSMENT — PAIN DESCRIPTION - DESCRIPTORS: DESCRIPTORS: ACHING;SHARP

## 2018-03-26 ASSESSMENT — PAIN SCALES - GENERAL: PAINLEVEL_OUTOF10: 8

## 2018-03-29 ENCOUNTER — TELEPHONE (OUTPATIENT)
Dept: FAMILY MEDICINE CLINIC | Age: 53
End: 2018-03-29

## 2018-03-29 DIAGNOSIS — M54.16 LUMBAR RADICULOPATHY: Primary | ICD-10-CM

## 2018-03-29 RX ORDER — PREDNISONE 1 MG/1
TABLET ORAL
Qty: 30 TABLET | Refills: 0 | Status: SHIPPED | OUTPATIENT
Start: 2018-03-29 | End: 2018-04-08

## 2018-03-29 RX ORDER — TRAMADOL HYDROCHLORIDE 50 MG/1
50 TABLET ORAL EVERY 6 HOURS PRN
Qty: 28 TABLET | Refills: 0 | Status: SHIPPED | OUTPATIENT
Start: 2018-03-29 | End: 2018-04-05

## 2018-04-05 ENCOUNTER — HOSPITAL ENCOUNTER (OUTPATIENT)
Dept: INTERVENTIONAL RADIOLOGY/VASCULAR | Age: 53
Discharge: HOME | End: 2018-04-05
Payer: COMMERCIAL

## 2018-04-05 VITALS
RESPIRATION RATE: 16 BRPM | WEIGHT: 220.8 LBS | DIASTOLIC BLOOD PRESSURE: 98 MMHG | TEMPERATURE: 97.4 F | BODY MASS INDEX: 31.68 KG/M2 | SYSTOLIC BLOOD PRESSURE: 160 MMHG | HEART RATE: 62 BPM

## 2018-04-05 PROCEDURE — 2500000003 HC RX 250 WO HCPCS

## 2018-04-05 PROCEDURE — 6360000004 HC RX CONTRAST MEDICATION: Performed by: RADIOLOGY

## 2018-04-05 PROCEDURE — 6360000002 HC RX W HCPCS

## 2018-04-05 PROCEDURE — 62323 NJX INTERLAMINAR LMBR/SAC: CPT | Performed by: RADIOLOGY

## 2018-04-05 RX ORDER — METHYLPREDNISOLONE ACETATE 80 MG/ML
80 INJECTION, SUSPENSION INTRA-ARTICULAR; INTRALESIONAL; INTRAMUSCULAR; SOFT TISSUE ONCE
Status: COMPLETED | OUTPATIENT
Start: 2018-04-05 | End: 2018-04-05

## 2018-04-05 RX ORDER — BUPIVACAINE HYDROCHLORIDE 2.5 MG/ML
5 INJECTION, SOLUTION EPIDURAL; INFILTRATION; INTRACAUDAL ONCE
Status: COMPLETED | OUTPATIENT
Start: 2018-04-05 | End: 2018-04-05

## 2018-04-05 RX ADMIN — BUPIVACAINE HYDROCHLORIDE 2 ML: 2.5 INJECTION, SOLUTION EPIDURAL; INFILTRATION; INTRACAUDAL at 14:33

## 2018-04-05 RX ADMIN — IOHEXOL 1 ML: 180 INJECTION INTRAVENOUS at 14:29

## 2018-04-05 RX ADMIN — METHYLPREDNISOLONE ACETATE 80 MG: 80 INJECTION, SUSPENSION INTRA-ARTICULAR; INTRALESIONAL; INTRAMUSCULAR; SOFT TISSUE at 14:33

## 2018-04-05 ASSESSMENT — PAIN - FUNCTIONAL ASSESSMENT: PAIN_FUNCTIONAL_ASSESSMENT: 0-10

## 2018-04-05 ASSESSMENT — PAIN DESCRIPTION - DESCRIPTORS: DESCRIPTORS: ACHING;SHARP

## 2018-04-05 ASSESSMENT — PAIN SCALES - GENERAL
PAINLEVEL_OUTOF10: 6
PAINLEVEL_OUTOF10: 0

## 2018-04-16 ENCOUNTER — OFFICE VISIT (OUTPATIENT)
Dept: FAMILY MEDICINE CLINIC | Age: 53
End: 2018-04-16
Payer: COMMERCIAL

## 2018-04-16 ENCOUNTER — TELEPHONE (OUTPATIENT)
Dept: FAMILY MEDICINE CLINIC | Age: 53
End: 2018-04-16

## 2018-04-16 VITALS
TEMPERATURE: 97.8 F | WEIGHT: 217 LBS | HEIGHT: 71 IN | HEART RATE: 56 BPM | SYSTOLIC BLOOD PRESSURE: 160 MMHG | DIASTOLIC BLOOD PRESSURE: 90 MMHG | RESPIRATION RATE: 16 BRPM | BODY MASS INDEX: 30.38 KG/M2

## 2018-04-16 DIAGNOSIS — M54.16 CHRONIC LUMBAR RADICULOPATHY: ICD-10-CM

## 2018-04-16 DIAGNOSIS — G89.29 CHRONIC NECK PAIN: ICD-10-CM

## 2018-04-16 DIAGNOSIS — M48.062 SPINAL STENOSIS OF LUMBAR REGION WITH NEUROGENIC CLAUDICATION: ICD-10-CM

## 2018-04-16 DIAGNOSIS — M54.2 CHRONIC NECK PAIN: ICD-10-CM

## 2018-04-16 DIAGNOSIS — I10 ESSENTIAL HYPERTENSION: Primary | Chronic | ICD-10-CM

## 2018-04-16 PROCEDURE — G8427 DOCREV CUR MEDS BY ELIG CLIN: HCPCS | Performed by: NURSE PRACTITIONER

## 2018-04-16 PROCEDURE — 1036F TOBACCO NON-USER: CPT | Performed by: NURSE PRACTITIONER

## 2018-04-16 PROCEDURE — 3017F COLORECTAL CA SCREEN DOC REV: CPT | Performed by: NURSE PRACTITIONER

## 2018-04-16 PROCEDURE — 99213 OFFICE O/P EST LOW 20 MIN: CPT | Performed by: NURSE PRACTITIONER

## 2018-04-16 PROCEDURE — G8417 CALC BMI ABV UP PARAM F/U: HCPCS | Performed by: NURSE PRACTITIONER

## 2018-04-16 RX ORDER — TRAMADOL HYDROCHLORIDE 50 MG/1
50-100 TABLET ORAL EVERY 6 HOURS PRN
Qty: 120 TABLET | Refills: 1 | Status: SHIPPED | OUTPATIENT
Start: 2018-04-16 | End: 2018-08-29

## 2018-04-16 RX ORDER — CLONIDINE HYDROCHLORIDE 0.1 MG/1
0.1 TABLET ORAL 2 TIMES DAILY
Qty: 60 TABLET | Refills: 3 | Status: SHIPPED | OUTPATIENT
Start: 2018-04-16 | End: 2018-08-16 | Stop reason: SDUPTHER

## 2018-04-25 ASSESSMENT — ENCOUNTER SYMPTOMS: BACK PAIN: 1

## 2018-05-01 RX ORDER — METOPROLOL TARTRATE 50 MG/1
TABLET, FILM COATED ORAL
Qty: 60 TABLET | Refills: 5 | Status: SHIPPED | OUTPATIENT
Start: 2018-05-01 | End: 2018-11-01 | Stop reason: SDUPTHER

## 2018-05-08 ENCOUNTER — HOSPITAL ENCOUNTER (EMERGENCY)
Age: 53
Discharge: HOME | End: 2018-05-08
Payer: COMMERCIAL

## 2018-05-08 VITALS
HEART RATE: 77 BPM | BODY MASS INDEX: 30.78 KG/M2 | OXYGEN SATURATION: 96 % | WEIGHT: 215 LBS | TEMPERATURE: 98.4 F | SYSTOLIC BLOOD PRESSURE: 145 MMHG | DIASTOLIC BLOOD PRESSURE: 89 MMHG | HEIGHT: 70 IN | RESPIRATION RATE: 14 BRPM

## 2018-05-08 DIAGNOSIS — M54.31 CHRONIC SCIATICA OF RIGHT SIDE: Primary | ICD-10-CM

## 2018-05-08 PROCEDURE — 6360000002 HC RX W HCPCS: Performed by: NURSE PRACTITIONER

## 2018-05-08 PROCEDURE — 96372 THER/PROPH/DIAG INJ SC/IM: CPT

## 2018-05-08 PROCEDURE — 99212 OFFICE O/P EST SF 10 MIN: CPT

## 2018-05-08 PROCEDURE — 99213 OFFICE O/P EST LOW 20 MIN: CPT | Performed by: NURSE PRACTITIONER

## 2018-05-08 RX ORDER — KETOROLAC TROMETHAMINE 30 MG/ML
60 INJECTION, SOLUTION INTRAMUSCULAR; INTRAVENOUS ONCE
Status: COMPLETED | OUTPATIENT
Start: 2018-05-08 | End: 2018-05-08

## 2018-05-08 RX ORDER — METHYLPREDNISOLONE ACETATE 80 MG/ML
80 INJECTION, SUSPENSION INTRA-ARTICULAR; INTRALESIONAL; INTRAMUSCULAR; SOFT TISSUE ONCE
Status: COMPLETED | OUTPATIENT
Start: 2018-05-08 | End: 2018-05-08

## 2018-05-08 RX ADMIN — KETOROLAC TROMETHAMINE 60 MG: 30 INJECTION, SOLUTION INTRAMUSCULAR at 16:12

## 2018-05-08 RX ADMIN — METHYLPREDNISOLONE ACETATE 80 MG: 80 INJECTION, SUSPENSION INTRA-ARTICULAR; INTRALESIONAL; INTRAMUSCULAR; SOFT TISSUE at 16:13

## 2018-05-08 ASSESSMENT — PAIN DESCRIPTION - DESCRIPTORS: DESCRIPTORS: THROBBING;SHARP;ACHING

## 2018-05-08 ASSESSMENT — PAIN DESCRIPTION - FREQUENCY: FREQUENCY: CONTINUOUS

## 2018-05-08 ASSESSMENT — PAIN DESCRIPTION - ONSET: ONSET: ON-GOING

## 2018-05-08 ASSESSMENT — ENCOUNTER SYMPTOMS
COUGH: 0
VOMITING: 0
DIARRHEA: 0
NAUSEA: 0
SHORTNESS OF BREATH: 0

## 2018-05-08 ASSESSMENT — PAIN DESCRIPTION - LOCATION: LOCATION: BACK;HIP

## 2018-05-08 ASSESSMENT — PAIN DESCRIPTION - ORIENTATION: ORIENTATION: RIGHT

## 2018-05-08 ASSESSMENT — PAIN SCALES - GENERAL
PAINLEVEL_OUTOF10: 8
PAINLEVEL_OUTOF10: 8

## 2018-05-08 ASSESSMENT — PAIN DESCRIPTION - PAIN TYPE: TYPE: CHRONIC PAIN

## 2018-05-18 ENCOUNTER — HOSPITAL ENCOUNTER (OUTPATIENT)
Dept: PREADMISSION TESTING | Age: 53
Discharge: HOME | End: 2018-05-22
Payer: COMMERCIAL

## 2018-05-18 ENCOUNTER — HOSPITAL ENCOUNTER (OUTPATIENT)
Dept: GENERAL RADIOLOGY | Age: 53
Discharge: HOME | End: 2018-05-18
Payer: COMMERCIAL

## 2018-05-18 ENCOUNTER — HOSPITAL ENCOUNTER (OUTPATIENT)
Age: 53
End: 2018-05-18
Payer: COMMERCIAL

## 2018-05-18 VITALS
OXYGEN SATURATION: 99 % | RESPIRATION RATE: 16 BRPM | WEIGHT: 205.03 LBS | BODY MASS INDEX: 28.7 KG/M2 | SYSTOLIC BLOOD PRESSURE: 132 MMHG | TEMPERATURE: 96 F | HEIGHT: 71 IN | DIASTOLIC BLOOD PRESSURE: 79 MMHG | HEART RATE: 47 BPM

## 2018-05-18 DIAGNOSIS — Z01.818 PRE-OP TESTING: ICD-10-CM

## 2018-05-18 LAB
ANION GAP SERPL CALCULATED.3IONS-SCNC: 11 MEQ/L (ref 8–16)
APTT: 31.1 SECONDS (ref 22–38)
BUN BLDV-MCNC: 29 MG/DL (ref 7–22)
CALCIUM SERPL-MCNC: 9 MG/DL (ref 8.5–10.5)
CHLORIDE BLD-SCNC: 97 MEQ/L (ref 98–111)
CO2: 28 MEQ/L (ref 23–33)
CREAT SERPL-MCNC: 0.9 MG/DL (ref 0.4–1.2)
EKG ATRIAL RATE: 45 BPM
EKG P AXIS: 52 DEGREES
EKG P-R INTERVAL: 184 MS
EKG Q-T INTERVAL: 454 MS
EKG QRS DURATION: 108 MS
EKG QTC CALCULATION (BAZETT): 392 MS
EKG R AXIS: 1 DEGREES
EKG T AXIS: 51 DEGREES
EKG VENTRICULAR RATE: 45 BPM
GFR SERPL CREATININE-BSD FRML MDRD: 88 ML/MIN/1.73M2
GLUCOSE BLD-MCNC: 111 MG/DL (ref 70–108)
HCT VFR BLD CALC: 37 % (ref 42–52)
HEMOGLOBIN: 12.6 GM/DL (ref 14–18)
INR BLD: 0.92 (ref 0.85–1.13)
MCH RBC QN AUTO: 29.5 PG (ref 27–31)
MCHC RBC AUTO-ENTMCNC: 34.1 GM/DL (ref 33–37)
MCV RBC AUTO: 86.5 FL (ref 80–94)
MRSA NASAL SCREEN RT-PCR: NEGATIVE
PDW BLD-RTO: 14 % (ref 11.5–14.5)
PLATELET # BLD: 414 THOU/MM3 (ref 130–400)
PMV BLD AUTO: 6.7 FL (ref 7.4–10.4)
POTASSIUM SERPL-SCNC: 4.7 MEQ/L (ref 3.5–5.2)
RBC # BLD: 4.28 MILL/MM3 (ref 4.7–6.1)
SODIUM BLD-SCNC: 136 MEQ/L (ref 135–145)
STAPH AUREUS SCREEN RT-PCR: POSITIVE
WBC # BLD: 7.1 THOU/MM3 (ref 4.8–10.8)

## 2018-05-18 PROCEDURE — 93005 ELECTROCARDIOGRAM TRACING: CPT | Performed by: ORTHOPAEDIC SURGERY

## 2018-05-18 PROCEDURE — 71046 X-RAY EXAM CHEST 2 VIEWS: CPT

## 2018-05-18 PROCEDURE — 87641 MR-STAPH DNA AMP PROBE: CPT

## 2018-05-18 PROCEDURE — 87081 CULTURE SCREEN ONLY: CPT

## 2018-05-18 PROCEDURE — 85730 THROMBOPLASTIN TIME PARTIAL: CPT

## 2018-05-18 PROCEDURE — 85610 PROTHROMBIN TIME: CPT

## 2018-05-18 PROCEDURE — 80048 BASIC METABOLIC PNL TOTAL CA: CPT

## 2018-05-18 PROCEDURE — 93010 ELECTROCARDIOGRAM REPORT: CPT | Performed by: INTERNAL MEDICINE

## 2018-05-18 PROCEDURE — 85027 COMPLETE CBC AUTOMATED: CPT

## 2018-05-18 PROCEDURE — 36415 COLL VENOUS BLD VENIPUNCTURE: CPT

## 2018-05-18 RX ORDER — SENNOSIDES 8.6 MG
1300 CAPSULE ORAL EVERY 8 HOURS PRN
COMMUNITY
End: 2020-11-05

## 2018-05-18 ASSESSMENT — PAIN DESCRIPTION - FREQUENCY: FREQUENCY: CONTINUOUS

## 2018-05-18 ASSESSMENT — PAIN DESCRIPTION - DESCRIPTORS: DESCRIPTORS: THROBBING;ACHING;SHARP

## 2018-05-18 ASSESSMENT — PAIN SCALES - GENERAL: PAINLEVEL_OUTOF10: 6

## 2018-05-18 ASSESSMENT — PAIN DESCRIPTION - PAIN TYPE: TYPE: CHRONIC PAIN

## 2018-05-18 ASSESSMENT — PAIN DESCRIPTION - PROGRESSION: CLINICAL_PROGRESSION: GRADUALLY WORSENING

## 2018-05-18 ASSESSMENT — PAIN DESCRIPTION - ONSET: ONSET: ON-GOING

## 2018-05-18 ASSESSMENT — PAIN DESCRIPTION - ORIENTATION: ORIENTATION: LOWER;RIGHT

## 2018-05-18 ASSESSMENT — PAIN DESCRIPTION - LOCATION: LOCATION: BACK;BUTTOCKS

## 2018-05-20 LAB — MRSA SCREEN: NORMAL

## 2018-05-31 ENCOUNTER — OFFICE VISIT (OUTPATIENT)
Dept: FAMILY MEDICINE CLINIC | Age: 53
End: 2018-05-31
Payer: COMMERCIAL

## 2018-05-31 VITALS
SYSTOLIC BLOOD PRESSURE: 108 MMHG | RESPIRATION RATE: 12 BRPM | DIASTOLIC BLOOD PRESSURE: 60 MMHG | HEIGHT: 70 IN | HEART RATE: 60 BPM | TEMPERATURE: 98.2 F | BODY MASS INDEX: 29.06 KG/M2 | WEIGHT: 203 LBS

## 2018-05-31 DIAGNOSIS — Z01.818 PREOP EXAMINATION: Primary | ICD-10-CM

## 2018-05-31 PROCEDURE — G8427 DOCREV CUR MEDS BY ELIG CLIN: HCPCS | Performed by: NURSE PRACTITIONER

## 2018-05-31 PROCEDURE — 99212 OFFICE O/P EST SF 10 MIN: CPT | Performed by: NURSE PRACTITIONER

## 2018-05-31 PROCEDURE — 3017F COLORECTAL CA SCREEN DOC REV: CPT | Performed by: NURSE PRACTITIONER

## 2018-05-31 PROCEDURE — G8417 CALC BMI ABV UP PARAM F/U: HCPCS | Performed by: NURSE PRACTITIONER

## 2018-05-31 ASSESSMENT — ENCOUNTER SYMPTOMS
EYES NEGATIVE: 1
GASTROINTESTINAL NEGATIVE: 1
RESPIRATORY NEGATIVE: 1
BACK PAIN: 1

## 2018-05-31 ASSESSMENT — PATIENT HEALTH QUESTIONNAIRE - PHQ9
2. FEELING DOWN, DEPRESSED OR HOPELESS: 0
SUM OF ALL RESPONSES TO PHQ9 QUESTIONS 1 & 2: 0
SUM OF ALL RESPONSES TO PHQ QUESTIONS 1-9: 0
1. LITTLE INTEREST OR PLEASURE IN DOING THINGS: 0

## 2018-06-08 RX ORDER — LOSARTAN POTASSIUM 100 MG/1
TABLET ORAL
Qty: 90 TABLET | Refills: 3 | Status: SHIPPED | OUTPATIENT
Start: 2018-06-08 | End: 2019-06-05 | Stop reason: SDUPTHER

## 2018-06-15 ENCOUNTER — APPOINTMENT (OUTPATIENT)
Dept: GENERAL RADIOLOGY | Age: 53
DRG: 304 | End: 2018-06-15
Attending: ORTHOPAEDIC SURGERY
Payer: COMMERCIAL

## 2018-06-15 ENCOUNTER — ANESTHESIA EVENT (OUTPATIENT)
Dept: OPERATING ROOM | Age: 53
DRG: 304 | End: 2018-06-15
Payer: COMMERCIAL

## 2018-06-15 ENCOUNTER — ANESTHESIA (OUTPATIENT)
Dept: OPERATING ROOM | Age: 53
DRG: 304 | End: 2018-06-15
Payer: COMMERCIAL

## 2018-06-15 ENCOUNTER — HOSPITAL ENCOUNTER (INPATIENT)
Age: 53
LOS: 2 days | Discharge: HOME | DRG: 304 | End: 2018-06-17
Attending: ORTHOPAEDIC SURGERY | Admitting: ORTHOPAEDIC SURGERY
Payer: COMMERCIAL

## 2018-06-15 VITALS
RESPIRATION RATE: 9 BRPM | SYSTOLIC BLOOD PRESSURE: 102 MMHG | OXYGEN SATURATION: 100 % | TEMPERATURE: 99 F | DIASTOLIC BLOOD PRESSURE: 58 MMHG

## 2018-06-15 PROBLEM — M48.061 DEGENERATIVE LUMBAR SPINAL STENOSIS: Status: ACTIVE | Noted: 2018-06-15

## 2018-06-15 LAB
ABO: NORMAL
ANTIBODY SCREEN: NORMAL
POTASSIUM SERPL-SCNC: 4.1 MEQ/L (ref 3.5–5.2)
RH FACTOR: NORMAL

## 2018-06-15 PROCEDURE — 2720000010 HC SURG SUPPLY STERILE: Performed by: ORTHOPAEDIC SURGERY

## 2018-06-15 PROCEDURE — 2580000003 HC RX 258: Performed by: PHYSICIAN ASSISTANT

## 2018-06-15 PROCEDURE — 86900 BLOOD TYPING SEROLOGIC ABO: CPT

## 2018-06-15 PROCEDURE — 72020 X-RAY EXAM OF SPINE 1 VIEW: CPT

## 2018-06-15 PROCEDURE — 2580000003 HC RX 258: Performed by: NURSE ANESTHETIST, CERTIFIED REGISTERED

## 2018-06-15 PROCEDURE — 95940 IONM IN OPERATNG ROOM 15 MIN: CPT | Performed by: ORTHOPAEDIC SURGERY

## 2018-06-15 PROCEDURE — 2500000003 HC RX 250 WO HCPCS: Performed by: NURSE ANESTHETIST, CERTIFIED REGISTERED

## 2018-06-15 PROCEDURE — 01NB0ZZ RELEASE LUMBAR NERVE, OPEN APPROACH: ICD-10-PCS | Performed by: ORTHOPAEDIC SURGERY

## 2018-06-15 PROCEDURE — 3600000005 HC SURGERY LEVEL 5 BASE: Performed by: ORTHOPAEDIC SURGERY

## 2018-06-15 PROCEDURE — 6360000002 HC RX W HCPCS: Performed by: NURSE ANESTHETIST, CERTIFIED REGISTERED

## 2018-06-15 PROCEDURE — 2780000010 HC IMPLANT OTHER: Performed by: ORTHOPAEDIC SURGERY

## 2018-06-15 PROCEDURE — 3700000000 HC ANESTHESIA ATTENDED CARE: Performed by: ORTHOPAEDIC SURGERY

## 2018-06-15 PROCEDURE — 2580000003 HC RX 258: Performed by: ORTHOPAEDIC SURGERY

## 2018-06-15 PROCEDURE — 6360000002 HC RX W HCPCS: Performed by: ANESTHESIOLOGY

## 2018-06-15 PROCEDURE — 2500000003 HC RX 250 WO HCPCS: Performed by: ORTHOPAEDIC SURGERY

## 2018-06-15 PROCEDURE — 84132 ASSAY OF SERUM POTASSIUM: CPT

## 2018-06-15 PROCEDURE — 7100000000 HC PACU RECOVERY - FIRST 15 MIN: Performed by: ORTHOPAEDIC SURGERY

## 2018-06-15 PROCEDURE — 6360000002 HC RX W HCPCS: Performed by: ORTHOPAEDIC SURGERY

## 2018-06-15 PROCEDURE — 6360000002 HC RX W HCPCS: Performed by: PHYSICIAN ASSISTANT

## 2018-06-15 PROCEDURE — 3600000015 HC SURGERY LEVEL 5 ADDTL 15MIN: Performed by: ORTHOPAEDIC SURGERY

## 2018-06-15 PROCEDURE — 0SG0071 FUSION OF LUMBAR VERTEBRAL JOINT WITH AUTOLOGOUS TISSUE SUBSTITUTE, POSTERIOR APPROACH, POSTERIOR COLUMN, OPEN APPROACH: ICD-10-PCS | Performed by: ORTHOPAEDIC SURGERY

## 2018-06-15 PROCEDURE — 36415 COLL VENOUS BLD VENIPUNCTURE: CPT

## 2018-06-15 PROCEDURE — C1713 ANCHOR/SCREW BN/BN,TIS/BN: HCPCS | Performed by: ORTHOPAEDIC SURGERY

## 2018-06-15 PROCEDURE — 6370000000 HC RX 637 (ALT 250 FOR IP): Performed by: PHYSICIAN ASSISTANT

## 2018-06-15 PROCEDURE — 1200000000 HC SEMI PRIVATE

## 2018-06-15 PROCEDURE — 86901 BLOOD TYPING SEROLOGIC RH(D): CPT

## 2018-06-15 PROCEDURE — 6370000000 HC RX 637 (ALT 250 FOR IP): Performed by: INTERNAL MEDICINE

## 2018-06-15 PROCEDURE — 7100000001 HC PACU RECOVERY - ADDTL 15 MIN: Performed by: ORTHOPAEDIC SURGERY

## 2018-06-15 PROCEDURE — 86850 RBC ANTIBODY SCREEN: CPT

## 2018-06-15 PROCEDURE — 3700000001 HC ADD 15 MINUTES (ANESTHESIA): Performed by: ORTHOPAEDIC SURGERY

## 2018-06-15 DEVICE — SET SCREW 7540020 BREAK OFF TI
Type: IMPLANTABLE DEVICE | Site: SPINE LUMBAR | Status: FUNCTIONAL
Brand: CD HORIZON® SPINAL SYSTEM

## 2018-06-15 DEVICE — CROSSLINK 811-322 LP MLTSP PL L 1.752.15
Type: IMPLANTABLE DEVICE | Site: SPINE LUMBAR | Status: FUNCTIONAL
Brand: TSRH® SPINAL SYSTEM

## 2018-06-15 DEVICE — ALLOGRAFT STRP DBM PLF GRFT 2.5CM X 5CM: Type: IMPLANTABLE DEVICE | Site: SPINE LUMBAR | Status: FUNCTIONAL

## 2018-06-15 RX ORDER — METOPROLOL TARTRATE 50 MG/1
50 TABLET, FILM COATED ORAL 2 TIMES DAILY
Status: DISCONTINUED | OUTPATIENT
Start: 2018-06-15 | End: 2018-06-15

## 2018-06-15 RX ORDER — ACETAMINOPHEN 325 MG/1
650 TABLET ORAL EVERY 4 HOURS PRN
Status: DISCONTINUED | OUTPATIENT
Start: 2018-06-15 | End: 2018-06-17 | Stop reason: HOSPADM

## 2018-06-15 RX ORDER — CLONIDINE HYDROCHLORIDE 0.1 MG/1
0.1 TABLET ORAL 2 TIMES DAILY
Status: DISCONTINUED | OUTPATIENT
Start: 2018-06-15 | End: 2018-06-15

## 2018-06-15 RX ORDER — SODIUM CHLORIDE 0.9 % (FLUSH) 0.9 %
10 SYRINGE (ML) INJECTION PRN
Status: DISCONTINUED | OUTPATIENT
Start: 2018-06-15 | End: 2018-06-15 | Stop reason: HOSPADM

## 2018-06-15 RX ORDER — HYDROMORPHONE HCL 110MG/55ML
PATIENT CONTROLLED ANALGESIA SYRINGE INTRAVENOUS PRN
Status: DISCONTINUED | OUTPATIENT
Start: 2018-06-15 | End: 2018-06-15 | Stop reason: SDUPTHER

## 2018-06-15 RX ORDER — LOSARTAN POTASSIUM 100 MG/1
100 TABLET ORAL DAILY
Status: DISCONTINUED | OUTPATIENT
Start: 2018-06-16 | End: 2018-06-17 | Stop reason: HOSPADM

## 2018-06-15 RX ORDER — FENTANYL CITRATE 50 UG/ML
50 INJECTION, SOLUTION INTRAMUSCULAR; INTRAVENOUS EVERY 5 MIN PRN
Status: COMPLETED | OUTPATIENT
Start: 2018-06-15 | End: 2018-06-15

## 2018-06-15 RX ORDER — SODIUM CHLORIDE 0.9 % (FLUSH) 0.9 %
10 SYRINGE (ML) INJECTION PRN
Status: DISCONTINUED | OUTPATIENT
Start: 2018-06-15 | End: 2018-06-17 | Stop reason: HOSPADM

## 2018-06-15 RX ORDER — LABETALOL HYDROCHLORIDE 5 MG/ML
5 INJECTION, SOLUTION INTRAVENOUS EVERY 10 MIN PRN
Status: DISCONTINUED | OUTPATIENT
Start: 2018-06-15 | End: 2018-06-15 | Stop reason: HOSPADM

## 2018-06-15 RX ORDER — SODIUM CHLORIDE 0.9 % (FLUSH) 0.9 %
10 SYRINGE (ML) INJECTION EVERY 12 HOURS SCHEDULED
Status: DISCONTINUED | OUTPATIENT
Start: 2018-06-15 | End: 2018-06-17 | Stop reason: HOSPADM

## 2018-06-15 RX ORDER — HYDRALAZINE HYDROCHLORIDE 20 MG/ML
5 INJECTION INTRAMUSCULAR; INTRAVENOUS EVERY 10 MIN PRN
Status: DISCONTINUED | OUTPATIENT
Start: 2018-06-15 | End: 2018-06-15 | Stop reason: HOSPADM

## 2018-06-15 RX ORDER — MORPHINE SULFATE 4 MG/ML
4 INJECTION, SOLUTION INTRAMUSCULAR; INTRAVENOUS
Status: ACTIVE | OUTPATIENT
Start: 2018-06-15 | End: 2018-06-16

## 2018-06-15 RX ORDER — ONDANSETRON 2 MG/ML
4 INJECTION INTRAMUSCULAR; INTRAVENOUS
Status: DISCONTINUED | OUTPATIENT
Start: 2018-06-15 | End: 2018-06-15 | Stop reason: HOSPADM

## 2018-06-15 RX ORDER — ACETAMINOPHEN 650 MG/1
650 SUPPOSITORY RECTAL EVERY 4 HOURS PRN
Status: DISCONTINUED | OUTPATIENT
Start: 2018-06-15 | End: 2018-06-17 | Stop reason: HOSPADM

## 2018-06-15 RX ORDER — NEOSTIGMINE METHYLSULFATE 1 MG/ML
INJECTION, SOLUTION INTRAVENOUS PRN
Status: DISCONTINUED | OUTPATIENT
Start: 2018-06-15 | End: 2018-06-15 | Stop reason: SDUPTHER

## 2018-06-15 RX ORDER — DIAZEPAM 5 MG/1
5 TABLET ORAL EVERY 6 HOURS PRN
Status: DISCONTINUED | OUTPATIENT
Start: 2018-06-15 | End: 2018-06-17 | Stop reason: HOSPADM

## 2018-06-15 RX ORDER — ONDANSETRON 2 MG/ML
INJECTION INTRAMUSCULAR; INTRAVENOUS PRN
Status: DISCONTINUED | OUTPATIENT
Start: 2018-06-15 | End: 2018-06-15 | Stop reason: SDUPTHER

## 2018-06-15 RX ORDER — MORPHINE SULFATE 4 MG/ML
4 INJECTION, SOLUTION INTRAMUSCULAR; INTRAVENOUS
Status: DISCONTINUED | OUTPATIENT
Start: 2018-06-15 | End: 2018-06-15

## 2018-06-15 RX ORDER — OXYCODONE HYDROCHLORIDE AND ACETAMINOPHEN 5; 325 MG/1; MG/1
1 TABLET ORAL EVERY 4 HOURS PRN
Status: DISCONTINUED | OUTPATIENT
Start: 2018-06-15 | End: 2018-06-17 | Stop reason: HOSPADM

## 2018-06-15 RX ORDER — ONDANSETRON 2 MG/ML
4 INJECTION INTRAMUSCULAR; INTRAVENOUS EVERY 6 HOURS PRN
Status: DISCONTINUED | OUTPATIENT
Start: 2018-06-15 | End: 2018-06-17 | Stop reason: HOSPADM

## 2018-06-15 RX ORDER — SODIUM CHLORIDE 9 MG/ML
INJECTION, SOLUTION INTRAVENOUS CONTINUOUS PRN
Status: DISCONTINUED | OUTPATIENT
Start: 2018-06-15 | End: 2018-06-15 | Stop reason: SDUPTHER

## 2018-06-15 RX ORDER — MEPERIDINE HYDROCHLORIDE 25 MG/ML
12.5 INJECTION INTRAMUSCULAR; INTRAVENOUS; SUBCUTANEOUS EVERY 5 MIN PRN
Status: DISCONTINUED | OUTPATIENT
Start: 2018-06-15 | End: 2018-06-15 | Stop reason: HOSPADM

## 2018-06-15 RX ORDER — MORPHINE SULFATE 2 MG/ML
2 INJECTION, SOLUTION INTRAMUSCULAR; INTRAVENOUS
Status: DISCONTINUED | OUTPATIENT
Start: 2018-06-15 | End: 2018-06-15

## 2018-06-15 RX ORDER — DOCUSATE SODIUM 100 MG/1
100 CAPSULE, LIQUID FILLED ORAL 2 TIMES DAILY
Status: DISCONTINUED | OUTPATIENT
Start: 2018-06-15 | End: 2018-06-17 | Stop reason: HOSPADM

## 2018-06-15 RX ORDER — SODIUM CHLORIDE 9 MG/ML
INJECTION, SOLUTION INTRAVENOUS CONTINUOUS
Status: DISCONTINUED | OUTPATIENT
Start: 2018-06-15 | End: 2018-06-16

## 2018-06-15 RX ORDER — PANTOPRAZOLE SODIUM 40 MG/1
40 TABLET, DELAYED RELEASE ORAL
Status: DISCONTINUED | OUTPATIENT
Start: 2018-06-16 | End: 2018-06-17 | Stop reason: HOSPADM

## 2018-06-15 RX ORDER — FENTANYL CITRATE 50 UG/ML
25 INJECTION, SOLUTION INTRAMUSCULAR; INTRAVENOUS EVERY 5 MIN PRN
Status: DISCONTINUED | OUTPATIENT
Start: 2018-06-15 | End: 2018-06-15 | Stop reason: HOSPADM

## 2018-06-15 RX ORDER — LIDOCAINE HYDROCHLORIDE 10 MG/ML
INJECTION, SOLUTION INFILTRATION; PERINEURAL PRN
Status: DISCONTINUED | OUTPATIENT
Start: 2018-06-15 | End: 2018-06-15 | Stop reason: SDUPTHER

## 2018-06-15 RX ORDER — ROCURONIUM BROMIDE 10 MG/ML
INJECTION, SOLUTION INTRAVENOUS PRN
Status: DISCONTINUED | OUTPATIENT
Start: 2018-06-15 | End: 2018-06-15 | Stop reason: SDUPTHER

## 2018-06-15 RX ORDER — BISACODYL 10 MG
10 SUPPOSITORY, RECTAL RECTAL DAILY PRN
Status: DISCONTINUED | OUTPATIENT
Start: 2018-06-15 | End: 2018-06-17 | Stop reason: HOSPADM

## 2018-06-15 RX ORDER — LOSARTAN POTASSIUM 100 MG/1
100 TABLET ORAL DAILY
Status: DISCONTINUED | OUTPATIENT
Start: 2018-06-16 | End: 2018-06-15

## 2018-06-15 RX ORDER — FENTANYL CITRATE 50 UG/ML
INJECTION, SOLUTION INTRAMUSCULAR; INTRAVENOUS PRN
Status: DISCONTINUED | OUTPATIENT
Start: 2018-06-15 | End: 2018-06-15 | Stop reason: SDUPTHER

## 2018-06-15 RX ORDER — CETIRIZINE HYDROCHLORIDE 10 MG/1
10 TABLET ORAL DAILY PRN
Status: DISCONTINUED | OUTPATIENT
Start: 2018-06-15 | End: 2018-06-17 | Stop reason: HOSPADM

## 2018-06-15 RX ORDER — OMEPRAZOLE 40 MG/1
40 CAPSULE, DELAYED RELEASE ORAL DAILY
Status: DISCONTINUED | OUTPATIENT
Start: 2018-06-15 | End: 2018-06-15 | Stop reason: CLARIF

## 2018-06-15 RX ORDER — OXYCODONE HYDROCHLORIDE AND ACETAMINOPHEN 5; 325 MG/1; MG/1
2 TABLET ORAL EVERY 4 HOURS PRN
Status: DISCONTINUED | OUTPATIENT
Start: 2018-06-15 | End: 2018-06-17 | Stop reason: HOSPADM

## 2018-06-15 RX ORDER — METOPROLOL TARTRATE 50 MG/1
50 TABLET, FILM COATED ORAL 2 TIMES DAILY
Status: DISCONTINUED | OUTPATIENT
Start: 2018-06-15 | End: 2018-06-17 | Stop reason: HOSPADM

## 2018-06-15 RX ORDER — SODIUM CHLORIDE 9 MG/ML
INJECTION, SOLUTION INTRAVENOUS CONTINUOUS
Status: DISCONTINUED | OUTPATIENT
Start: 2018-06-15 | End: 2018-06-15

## 2018-06-15 RX ORDER — LOSARTAN POTASSIUM 100 MG/1
100 TABLET ORAL DAILY
Status: DISCONTINUED | OUTPATIENT
Start: 2018-06-15 | End: 2018-06-15

## 2018-06-15 RX ORDER — MORPHINE SULFATE 2 MG/ML
2 INJECTION, SOLUTION INTRAMUSCULAR; INTRAVENOUS
Status: DISPENSED | OUTPATIENT
Start: 2018-06-15 | End: 2018-06-16

## 2018-06-15 RX ORDER — MORPHINE SULFATE 4 MG/ML
INJECTION, SOLUTION INTRAMUSCULAR; INTRAVENOUS
Status: DISPENSED
Start: 2018-06-15 | End: 2018-06-16

## 2018-06-15 RX ORDER — GLYCOPYRROLATE 1 MG/5 ML
SYRINGE (ML) INTRAVENOUS PRN
Status: DISCONTINUED | OUTPATIENT
Start: 2018-06-15 | End: 2018-06-15 | Stop reason: SDUPTHER

## 2018-06-15 RX ORDER — DEXAMETHASONE SODIUM PHOSPHATE 4 MG/ML
INJECTION, SOLUTION INTRA-ARTICULAR; INTRALESIONAL; INTRAMUSCULAR; INTRAVENOUS; SOFT TISSUE PRN
Status: DISCONTINUED | OUTPATIENT
Start: 2018-06-15 | End: 2018-06-15 | Stop reason: SDUPTHER

## 2018-06-15 RX ORDER — SODIUM CHLORIDE 0.9 % (FLUSH) 0.9 %
10 SYRINGE (ML) INJECTION EVERY 12 HOURS SCHEDULED
Status: DISCONTINUED | OUTPATIENT
Start: 2018-06-15 | End: 2018-06-15 | Stop reason: HOSPADM

## 2018-06-15 RX ORDER — PROMETHAZINE HYDROCHLORIDE 25 MG/ML
6.25 INJECTION, SOLUTION INTRAMUSCULAR; INTRAVENOUS
Status: DISCONTINUED | OUTPATIENT
Start: 2018-06-15 | End: 2018-06-15 | Stop reason: HOSPADM

## 2018-06-15 RX ORDER — CLONIDINE HYDROCHLORIDE 0.1 MG/1
0.1 TABLET ORAL 2 TIMES DAILY
Status: DISCONTINUED | OUTPATIENT
Start: 2018-06-15 | End: 2018-06-16

## 2018-06-15 RX ORDER — PROPOFOL 10 MG/ML
INJECTION, EMULSION INTRAVENOUS PRN
Status: DISCONTINUED | OUTPATIENT
Start: 2018-06-15 | End: 2018-06-15 | Stop reason: SDUPTHER

## 2018-06-15 RX ADMIN — PHENYLEPHRINE HYDROCHLORIDE 100 MCG: 10 INJECTION INTRAVENOUS at 13:05

## 2018-06-15 RX ADMIN — FENTANYL CITRATE 100 MCG: 50 INJECTION INTRAMUSCULAR; INTRAVENOUS at 12:21

## 2018-06-15 RX ADMIN — HYDROMORPHONE HYDROCHLORIDE 1 MG: 2 INJECTION INTRAMUSCULAR; INTRAVENOUS; SUBCUTANEOUS at 14:17

## 2018-06-15 RX ADMIN — ROCURONIUM BROMIDE 50 MG: 10 INJECTION, SOLUTION INTRAVENOUS at 12:21

## 2018-06-15 RX ADMIN — Medication 2 G: at 12:40

## 2018-06-15 RX ADMIN — PROPOFOL 170 MG: 10 INJECTION, EMULSION INTRAVENOUS at 12:21

## 2018-06-15 RX ADMIN — DIAZEPAM 5 MG: 5 TABLET ORAL at 17:34

## 2018-06-15 RX ADMIN — Medication 0.2 MG: at 13:06

## 2018-06-15 RX ADMIN — PHENYLEPHRINE HYDROCHLORIDE 100 MCG: 10 INJECTION INTRAVENOUS at 12:41

## 2018-06-15 RX ADMIN — SODIUM CHLORIDE: 9 INJECTION, SOLUTION INTRAVENOUS at 17:34

## 2018-06-15 RX ADMIN — SODIUM CHLORIDE: 9 INJECTION, SOLUTION INTRAVENOUS at 13:04

## 2018-06-15 RX ADMIN — FENTANYL CITRATE 50 MCG: 50 INJECTION INTRAMUSCULAR; INTRAVENOUS at 12:50

## 2018-06-15 RX ADMIN — SODIUM CHLORIDE: 9 INJECTION, SOLUTION INTRAVENOUS at 20:18

## 2018-06-15 RX ADMIN — FENTANYL CITRATE 50 MCG: 50 INJECTION, SOLUTION INTRAMUSCULAR; INTRAVENOUS at 14:50

## 2018-06-15 RX ADMIN — OXYCODONE HYDROCHLORIDE AND ACETAMINOPHEN 2 TABLET: 5; 325 TABLET ORAL at 19:34

## 2018-06-15 RX ADMIN — FENTANYL CITRATE 50 MCG: 50 INJECTION INTRAMUSCULAR; INTRAVENOUS at 12:48

## 2018-06-15 RX ADMIN — MUPIROCIN: 20 OINTMENT TOPICAL at 20:21

## 2018-06-15 RX ADMIN — DEXAMETHASONE SODIUM PHOSPHATE 8 MG: 4 INJECTION, SOLUTION INTRAMUSCULAR; INTRAVENOUS at 12:28

## 2018-06-15 RX ADMIN — PHENYLEPHRINE HYDROCHLORIDE 100 MCG: 10 INJECTION INTRAVENOUS at 12:43

## 2018-06-15 RX ADMIN — LIDOCAINE HYDROCHLORIDE 50 MG: 10 INJECTION, SOLUTION INFILTRATION; PERINEURAL at 12:21

## 2018-06-15 RX ADMIN — SODIUM CHLORIDE: 9 INJECTION, SOLUTION INTRAVENOUS at 14:25

## 2018-06-15 RX ADMIN — FENTANYL CITRATE 50 MCG: 50 INJECTION, SOLUTION INTRAMUSCULAR; INTRAVENOUS at 14:55

## 2018-06-15 RX ADMIN — ONDANSETRON HYDROCHLORIDE 4 MG: 4 INJECTION, SOLUTION INTRAMUSCULAR; INTRAVENOUS at 14:25

## 2018-06-15 RX ADMIN — FENTANYL CITRATE 50 MCG: 50 INJECTION, SOLUTION INTRAMUSCULAR; INTRAVENOUS at 14:40

## 2018-06-15 RX ADMIN — SODIUM CHLORIDE: 9 INJECTION, SOLUTION INTRAVENOUS at 11:43

## 2018-06-15 RX ADMIN — MORPHINE SULFATE 2 MG: 2 INJECTION, SOLUTION INTRAMUSCULAR; INTRAVENOUS at 21:38

## 2018-06-15 RX ADMIN — MORPHINE SULFATE 4 MG: 4 INJECTION INTRAVENOUS at 16:31

## 2018-06-15 RX ADMIN — FENTANYL CITRATE 50 MCG: 50 INJECTION INTRAMUSCULAR; INTRAVENOUS at 14:32

## 2018-06-15 RX ADMIN — SODIUM CHLORIDE: 9 INJECTION, SOLUTION INTRAVENOUS at 12:18

## 2018-06-15 RX ADMIN — ROCURONIUM BROMIDE 30 MG: 10 INJECTION, SOLUTION INTRAVENOUS at 12:48

## 2018-06-15 RX ADMIN — DOCUSATE SODIUM 100 MG: 100 CAPSULE, LIQUID FILLED ORAL at 20:32

## 2018-06-15 RX ADMIN — NEOSTIGMINE METHYLSULFATE 4 MG: 1 INJECTION, SOLUTION INTRAVENOUS at 14:21

## 2018-06-15 RX ADMIN — FENTANYL CITRATE 50 MCG: 50 INJECTION, SOLUTION INTRAMUSCULAR; INTRAVENOUS at 14:45

## 2018-06-15 RX ADMIN — CLONIDINE HYDROCHLORIDE 0.1 MG: 0.1 TABLET ORAL at 20:20

## 2018-06-15 RX ADMIN — Medication 0.8 MG: at 14:21

## 2018-06-15 RX ADMIN — METOPROLOL TARTRATE 50 MG: 50 TABLET, FILM COATED ORAL at 20:20

## 2018-06-15 RX ADMIN — CEFAZOLIN SODIUM 2 G: 10 INJECTION, POWDER, FOR SOLUTION INTRAVENOUS at 22:31

## 2018-06-15 ASSESSMENT — PULMONARY FUNCTION TESTS
PIF_VALUE: 16
PIF_VALUE: 14
PIF_VALUE: 16
PIF_VALUE: 17
PIF_VALUE: 16
PIF_VALUE: 16
PIF_VALUE: 17
PIF_VALUE: 16
PIF_VALUE: 17
PIF_VALUE: 16
PIF_VALUE: 15
PIF_VALUE: 15
PIF_VALUE: 21
PIF_VALUE: 16
PIF_VALUE: 17
PIF_VALUE: 4
PIF_VALUE: 16
PIF_VALUE: 2
PIF_VALUE: 5
PIF_VALUE: 2
PIF_VALUE: 15
PIF_VALUE: 16
PIF_VALUE: 2
PIF_VALUE: 2
PIF_VALUE: 17
PIF_VALUE: 15
PIF_VALUE: 17
PIF_VALUE: 16
PIF_VALUE: 18
PIF_VALUE: 17
PIF_VALUE: 16
PIF_VALUE: 17
PIF_VALUE: 16
PIF_VALUE: 10
PIF_VALUE: 16
PIF_VALUE: 8
PIF_VALUE: 16
PIF_VALUE: 17
PIF_VALUE: 15
PIF_VALUE: 16
PIF_VALUE: 17
PIF_VALUE: 16
PIF_VALUE: 16
PIF_VALUE: 17
PIF_VALUE: 16
PIF_VALUE: 16
PIF_VALUE: 18
PIF_VALUE: 16
PIF_VALUE: 16
PIF_VALUE: 9
PIF_VALUE: 15
PIF_VALUE: 2
PIF_VALUE: 18
PIF_VALUE: 17
PIF_VALUE: 2
PIF_VALUE: 15
PIF_VALUE: 1
PIF_VALUE: 17
PIF_VALUE: 16
PIF_VALUE: 2
PIF_VALUE: 16
PIF_VALUE: 18
PIF_VALUE: 16
PIF_VALUE: 17
PIF_VALUE: 10
PIF_VALUE: 4
PIF_VALUE: 16
PIF_VALUE: 2
PIF_VALUE: 17
PIF_VALUE: 17
PIF_VALUE: 15
PIF_VALUE: 16
PIF_VALUE: 16
PIF_VALUE: 17
PIF_VALUE: 15
PIF_VALUE: 16
PIF_VALUE: 3
PIF_VALUE: 16
PIF_VALUE: 16
PIF_VALUE: 17
PIF_VALUE: 16
PIF_VALUE: 2
PIF_VALUE: 16
PIF_VALUE: 16
PIF_VALUE: 17
PIF_VALUE: 20
PIF_VALUE: 2
PIF_VALUE: 16
PIF_VALUE: 17
PIF_VALUE: 2
PIF_VALUE: 16
PIF_VALUE: 16
PIF_VALUE: 18
PIF_VALUE: 22
PIF_VALUE: 16
PIF_VALUE: 17
PIF_VALUE: 16
PIF_VALUE: 17
PIF_VALUE: 8
PIF_VALUE: 17
PIF_VALUE: 15
PIF_VALUE: 16
PIF_VALUE: 17
PIF_VALUE: 2
PIF_VALUE: 16
PIF_VALUE: 16
PIF_VALUE: 14
PIF_VALUE: 20
PIF_VALUE: 17
PIF_VALUE: 16
PIF_VALUE: 25
PIF_VALUE: 16
PIF_VALUE: 16
PIF_VALUE: 22

## 2018-06-15 ASSESSMENT — PAIN DESCRIPTION - ORIENTATION
ORIENTATION: RIGHT
ORIENTATION: LOWER
ORIENTATION: RIGHT

## 2018-06-15 ASSESSMENT — PAIN SCALES - GENERAL
PAINLEVEL_OUTOF10: 7
PAINLEVEL_OUTOF10: 10
PAINLEVEL_OUTOF10: 4
PAINLEVEL_OUTOF10: 10
PAINLEVEL_OUTOF10: 5
PAINLEVEL_OUTOF10: 10
PAINLEVEL_OUTOF10: 5
PAINLEVEL_OUTOF10: 7
PAINLEVEL_OUTOF10: 2
PAINLEVEL_OUTOF10: 9
PAINLEVEL_OUTOF10: 8
PAINLEVEL_OUTOF10: 5

## 2018-06-15 ASSESSMENT — PAIN DESCRIPTION - LOCATION
LOCATION: BACK;LEG
LOCATION: BACK
LOCATION: BACK
LOCATION: BACK;LEG

## 2018-06-15 ASSESSMENT — PAIN DESCRIPTION - PAIN TYPE
TYPE: CHRONIC PAIN;SURGICAL PAIN
TYPE: SURGICAL PAIN
TYPE: SURGICAL PAIN;CHRONIC PAIN

## 2018-06-15 ASSESSMENT — PAIN DESCRIPTION - DESCRIPTORS
DESCRIPTORS: ACHING;NUMBNESS;TINGLING
DESCRIPTORS: ACHING
DESCRIPTORS: BURNING

## 2018-06-15 ASSESSMENT — PAIN - FUNCTIONAL ASSESSMENT: PAIN_FUNCTIONAL_ASSESSMENT: 0-10

## 2018-06-16 LAB
ANION GAP SERPL CALCULATED.3IONS-SCNC: 11 MEQ/L (ref 8–16)
BUN BLDV-MCNC: 18 MG/DL (ref 7–22)
CALCIUM SERPL-MCNC: 8.4 MG/DL (ref 8.5–10.5)
CHLORIDE BLD-SCNC: 102 MEQ/L (ref 98–111)
CO2: 25 MEQ/L (ref 23–33)
CREAT SERPL-MCNC: 1 MG/DL (ref 0.4–1.2)
GFR SERPL CREATININE-BSD FRML MDRD: 78 ML/MIN/1.73M2
GLUCOSE BLD-MCNC: 126 MG/DL (ref 70–108)
HCT VFR BLD CALC: 32.9 % (ref 42–52)
HEMOGLOBIN: 11 GM/DL (ref 14–18)
POTASSIUM REFLEX MAGNESIUM: 4.5 MEQ/L (ref 3.5–5.2)
SODIUM BLD-SCNC: 138 MEQ/L (ref 135–145)

## 2018-06-16 PROCEDURE — 2580000003 HC RX 258: Performed by: PHYSICIAN ASSISTANT

## 2018-06-16 PROCEDURE — G8978 MOBILITY CURRENT STATUS: HCPCS

## 2018-06-16 PROCEDURE — G8988 SELF CARE GOAL STATUS: HCPCS

## 2018-06-16 PROCEDURE — 36415 COLL VENOUS BLD VENIPUNCTURE: CPT

## 2018-06-16 PROCEDURE — 97535 SELF CARE MNGMENT TRAINING: CPT

## 2018-06-16 PROCEDURE — 97530 THERAPEUTIC ACTIVITIES: CPT

## 2018-06-16 PROCEDURE — 97161 PT EVAL LOW COMPLEX 20 MIN: CPT

## 2018-06-16 PROCEDURE — 97165 OT EVAL LOW COMPLEX 30 MIN: CPT

## 2018-06-16 PROCEDURE — 80048 BASIC METABOLIC PNL TOTAL CA: CPT

## 2018-06-16 PROCEDURE — 85014 HEMATOCRIT: CPT

## 2018-06-16 PROCEDURE — 6360000002 HC RX W HCPCS: Performed by: PHYSICIAN ASSISTANT

## 2018-06-16 PROCEDURE — 6370000000 HC RX 637 (ALT 250 FOR IP): Performed by: INTERNAL MEDICINE

## 2018-06-16 PROCEDURE — 97110 THERAPEUTIC EXERCISES: CPT

## 2018-06-16 PROCEDURE — 85018 HEMOGLOBIN: CPT

## 2018-06-16 PROCEDURE — 6370000000 HC RX 637 (ALT 250 FOR IP): Performed by: PHYSICIAN ASSISTANT

## 2018-06-16 PROCEDURE — G8987 SELF CARE CURRENT STATUS: HCPCS

## 2018-06-16 PROCEDURE — G8979 MOBILITY GOAL STATUS: HCPCS

## 2018-06-16 PROCEDURE — 1200000000 HC SEMI PRIVATE

## 2018-06-16 RX ORDER — CLONIDINE HYDROCHLORIDE 0.1 MG/1
0.1 TABLET ORAL 3 TIMES DAILY
Status: DISCONTINUED | OUTPATIENT
Start: 2018-06-16 | End: 2018-06-17 | Stop reason: HOSPADM

## 2018-06-16 RX ORDER — DEXAMETHASONE SODIUM PHOSPHATE 4 MG/ML
8 INJECTION, SOLUTION INTRA-ARTICULAR; INTRALESIONAL; INTRAMUSCULAR; INTRAVENOUS; SOFT TISSUE EVERY 6 HOURS
Status: COMPLETED | OUTPATIENT
Start: 2018-06-16 | End: 2018-06-16

## 2018-06-16 RX ADMIN — MUPIROCIN: 20 OINTMENT TOPICAL at 08:47

## 2018-06-16 RX ADMIN — OXYCODONE HYDROCHLORIDE AND ACETAMINOPHEN 2 TABLET: 5; 325 TABLET ORAL at 05:52

## 2018-06-16 RX ADMIN — DIAZEPAM 5 MG: 5 TABLET ORAL at 08:47

## 2018-06-16 RX ADMIN — PANTOPRAZOLE SODIUM 40 MG: 40 TABLET, DELAYED RELEASE ORAL at 05:52

## 2018-06-16 RX ADMIN — METOPROLOL TARTRATE 50 MG: 50 TABLET, FILM COATED ORAL at 08:47

## 2018-06-16 RX ADMIN — SODIUM CHLORIDE: 9 INJECTION, SOLUTION INTRAVENOUS at 04:59

## 2018-06-16 RX ADMIN — CEFAZOLIN SODIUM 2 G: 10 INJECTION, POWDER, FOR SOLUTION INTRAVENOUS at 05:52

## 2018-06-16 RX ADMIN — OXYCODONE HYDROCHLORIDE AND ACETAMINOPHEN 2 TABLET: 5; 325 TABLET ORAL at 14:01

## 2018-06-16 RX ADMIN — Medication 10 ML: at 20:47

## 2018-06-16 RX ADMIN — DOCUSATE SODIUM 100 MG: 100 CAPSULE, LIQUID FILLED ORAL at 20:46

## 2018-06-16 RX ADMIN — MUPIROCIN: 20 OINTMENT TOPICAL at 20:47

## 2018-06-16 RX ADMIN — CLONIDINE HYDROCHLORIDE 0.1 MG: 0.1 TABLET ORAL at 08:47

## 2018-06-16 RX ADMIN — OXYCODONE HYDROCHLORIDE AND ACETAMINOPHEN 2 TABLET: 5; 325 TABLET ORAL at 10:01

## 2018-06-16 RX ADMIN — DOCUSATE SODIUM 100 MG: 100 CAPSULE, LIQUID FILLED ORAL at 08:47

## 2018-06-16 RX ADMIN — CLONIDINE HYDROCHLORIDE 0.1 MG: 0.1 TABLET ORAL at 16:32

## 2018-06-16 RX ADMIN — ACETAMINOPHEN 650 MG: 325 TABLET ORAL at 02:49

## 2018-06-16 RX ADMIN — CLONIDINE HYDROCHLORIDE 0.1 MG: 0.1 TABLET ORAL at 22:34

## 2018-06-16 RX ADMIN — OXYCODONE HYDROCHLORIDE AND ACETAMINOPHEN 2 TABLET: 5; 325 TABLET ORAL at 00:33

## 2018-06-16 RX ADMIN — METOPROLOL TARTRATE 50 MG: 50 TABLET, FILM COATED ORAL at 20:46

## 2018-06-16 RX ADMIN — LOSARTAN POTASSIUM 100 MG: 100 TABLET, FILM COATED ORAL at 08:47

## 2018-06-16 RX ADMIN — Medication 10 ML: at 08:47

## 2018-06-16 RX ADMIN — DEXAMETHASONE SODIUM PHOSPHATE 8 MG: 4 INJECTION, SOLUTION INTRA-ARTICULAR; INTRALESIONAL; INTRAMUSCULAR; INTRAVENOUS; SOFT TISSUE at 12:06

## 2018-06-16 RX ADMIN — OXYCODONE HYDROCHLORIDE AND ACETAMINOPHEN 2 TABLET: 5; 325 TABLET ORAL at 20:46

## 2018-06-16 ASSESSMENT — PAIN DESCRIPTION - PAIN TYPE
TYPE: SURGICAL PAIN

## 2018-06-16 ASSESSMENT — PAIN SCALES - GENERAL
PAINLEVEL_OUTOF10: 6
PAINLEVEL_OUTOF10: 7
PAINLEVEL_OUTOF10: 5
PAINLEVEL_OUTOF10: 7
PAINLEVEL_OUTOF10: 4
PAINLEVEL_OUTOF10: 7
PAINLEVEL_OUTOF10: 4
PAINLEVEL_OUTOF10: 6
PAINLEVEL_OUTOF10: 7
PAINLEVEL_OUTOF10: 8
PAINLEVEL_OUTOF10: 7
PAINLEVEL_OUTOF10: 7

## 2018-06-16 ASSESSMENT — PAIN DESCRIPTION - DESCRIPTORS: DESCRIPTORS: ACHING

## 2018-06-16 ASSESSMENT — PAIN DESCRIPTION - ORIENTATION
ORIENTATION: LOWER
ORIENTATION: LOWER

## 2018-06-16 ASSESSMENT — PAIN DESCRIPTION - LOCATION
LOCATION: BACK

## 2018-06-17 VITALS
RESPIRATION RATE: 18 BRPM | HEART RATE: 65 BPM | HEIGHT: 70 IN | WEIGHT: 198.8 LBS | BODY MASS INDEX: 28.46 KG/M2 | OXYGEN SATURATION: 98 % | TEMPERATURE: 98.2 F | DIASTOLIC BLOOD PRESSURE: 72 MMHG | SYSTOLIC BLOOD PRESSURE: 148 MMHG

## 2018-06-17 LAB
ANION GAP SERPL CALCULATED.3IONS-SCNC: 10 MEQ/L (ref 8–16)
BUN BLDV-MCNC: 16 MG/DL (ref 7–22)
CALCIUM SERPL-MCNC: 8.3 MG/DL (ref 8.5–10.5)
CHLORIDE BLD-SCNC: 104 MEQ/L (ref 98–111)
CO2: 25 MEQ/L (ref 23–33)
CREAT SERPL-MCNC: 0.9 MG/DL (ref 0.4–1.2)
GFR SERPL CREATININE-BSD FRML MDRD: 88 ML/MIN/1.73M2
GLUCOSE BLD-MCNC: 126 MG/DL (ref 70–108)
HCT VFR BLD CALC: 30.7 % (ref 42–52)
HEMOGLOBIN: 10 GM/DL (ref 14–18)
POTASSIUM REFLEX MAGNESIUM: 4.2 MEQ/L (ref 3.5–5.2)
SODIUM BLD-SCNC: 139 MEQ/L (ref 135–145)

## 2018-06-17 PROCEDURE — 6370000000 HC RX 637 (ALT 250 FOR IP): Performed by: INTERNAL MEDICINE

## 2018-06-17 PROCEDURE — 85018 HEMOGLOBIN: CPT

## 2018-06-17 PROCEDURE — 2580000003 HC RX 258: Performed by: PHYSICIAN ASSISTANT

## 2018-06-17 PROCEDURE — 80048 BASIC METABOLIC PNL TOTAL CA: CPT

## 2018-06-17 PROCEDURE — 6370000000 HC RX 637 (ALT 250 FOR IP): Performed by: PHYSICIAN ASSISTANT

## 2018-06-17 PROCEDURE — 85014 HEMATOCRIT: CPT

## 2018-06-17 PROCEDURE — 36415 COLL VENOUS BLD VENIPUNCTURE: CPT

## 2018-06-17 RX ORDER — SODIUM PHOSPHATE, DIBASIC AND SODIUM PHOSPHATE, MONOBASIC 7; 19 G/133ML; G/133ML
1 ENEMA RECTAL
Status: COMPLETED | OUTPATIENT
Start: 2018-06-17 | End: 2018-06-17

## 2018-06-17 RX ORDER — BISACODYL 10 MG
10 SUPPOSITORY, RECTAL RECTAL ONCE
Status: DISCONTINUED | OUTPATIENT
Start: 2018-06-17 | End: 2018-06-17 | Stop reason: HOSPADM

## 2018-06-17 RX ADMIN — PANTOPRAZOLE SODIUM 40 MG: 40 TABLET, DELAYED RELEASE ORAL at 06:22

## 2018-06-17 RX ADMIN — OXYCODONE HYDROCHLORIDE AND ACETAMINOPHEN 2 TABLET: 5; 325 TABLET ORAL at 09:26

## 2018-06-17 RX ADMIN — OXYCODONE HYDROCHLORIDE AND ACETAMINOPHEN 2 TABLET: 5; 325 TABLET ORAL at 04:56

## 2018-06-17 RX ADMIN — CLONIDINE HYDROCHLORIDE 0.1 MG: 0.1 TABLET ORAL at 09:25

## 2018-06-17 RX ADMIN — LOSARTAN POTASSIUM 100 MG: 100 TABLET, FILM COATED ORAL at 09:25

## 2018-06-17 RX ADMIN — MUPIROCIN: 20 OINTMENT TOPICAL at 09:25

## 2018-06-17 RX ADMIN — CLONIDINE HYDROCHLORIDE 0.1 MG: 0.1 TABLET ORAL at 15:15

## 2018-06-17 RX ADMIN — SODIUM PHOSPHATE 1 ENEMA: 7; 19 ENEMA RECTAL at 18:35

## 2018-06-17 RX ADMIN — MAGNESIUM HYDROXIDE 30 ML: 400 SUSPENSION ORAL at 04:56

## 2018-06-17 RX ADMIN — OXYCODONE HYDROCHLORIDE AND ACETAMINOPHEN 2 TABLET: 5; 325 TABLET ORAL at 15:15

## 2018-06-17 RX ADMIN — DOCUSATE SODIUM 100 MG: 100 CAPSULE, LIQUID FILLED ORAL at 09:25

## 2018-06-17 RX ADMIN — METOPROLOL TARTRATE 50 MG: 50 TABLET, FILM COATED ORAL at 09:25

## 2018-06-17 RX ADMIN — Medication 10 ML: at 09:26

## 2018-06-17 RX ADMIN — BISACODYL 10 MG: 10 SUPPOSITORY RECTAL at 15:15

## 2018-06-17 RX ADMIN — OXYCODONE HYDROCHLORIDE AND ACETAMINOPHEN 2 TABLET: 5; 325 TABLET ORAL at 00:46

## 2018-06-17 ASSESSMENT — PAIN SCALES - GENERAL
PAINLEVEL_OUTOF10: 5
PAINLEVEL_OUTOF10: 5
PAINLEVEL_OUTOF10: 7
PAINLEVEL_OUTOF10: 5
PAINLEVEL_OUTOF10: 5
PAINLEVEL_OUTOF10: 7

## 2018-06-17 ASSESSMENT — PAIN DESCRIPTION - LOCATION
LOCATION: BACK
LOCATION: BACK

## 2018-06-17 ASSESSMENT — PAIN DESCRIPTION - ORIENTATION
ORIENTATION: LOWER
ORIENTATION: LOWER

## 2018-06-17 ASSESSMENT — PAIN DESCRIPTION - PAIN TYPE
TYPE: SURGICAL PAIN
TYPE: SURGICAL PAIN

## 2018-06-19 ENCOUNTER — TELEPHONE (OUTPATIENT)
Dept: FAMILY MEDICINE CLINIC | Age: 53
End: 2018-06-19

## 2018-06-22 ENCOUNTER — OFFICE VISIT (OUTPATIENT)
Dept: FAMILY MEDICINE CLINIC | Age: 53
End: 2018-06-22
Payer: COMMERCIAL

## 2018-06-22 VITALS
WEIGHT: 203 LBS | BODY MASS INDEX: 29.13 KG/M2 | SYSTOLIC BLOOD PRESSURE: 124 MMHG | TEMPERATURE: 98.4 F | HEART RATE: 72 BPM | DIASTOLIC BLOOD PRESSURE: 82 MMHG | RESPIRATION RATE: 16 BRPM

## 2018-06-22 DIAGNOSIS — M48.061 DEGENERATIVE LUMBAR SPINAL STENOSIS: ICD-10-CM

## 2018-06-22 DIAGNOSIS — Z98.1 S/P LUMBAR FUSION: Primary | ICD-10-CM

## 2018-06-22 PROCEDURE — 99495 TRANSJ CARE MGMT MOD F2F 14D: CPT | Performed by: NURSE PRACTITIONER

## 2018-06-22 PROCEDURE — 1111F DSCHRG MED/CURRENT MED MERGE: CPT | Performed by: NURSE PRACTITIONER

## 2018-06-22 RX ORDER — OXYCODONE AND ACETAMINOPHEN 10; 325 MG/1; MG/1
1 TABLET ORAL EVERY 4 HOURS PRN
COMMUNITY
End: 2018-08-29

## 2018-06-28 ASSESSMENT — ENCOUNTER SYMPTOMS
BACK PAIN: 1
GASTROINTESTINAL NEGATIVE: 1
EYES NEGATIVE: 1
RESPIRATORY NEGATIVE: 1

## 2018-07-18 ENCOUNTER — HOSPITAL ENCOUNTER (OUTPATIENT)
Dept: MRI IMAGING | Age: 53
Discharge: HOME | End: 2018-07-18
Payer: COMMERCIAL

## 2018-07-18 DIAGNOSIS — R20.0 BILATERAL LEG NUMBNESS: ICD-10-CM

## 2018-07-18 DIAGNOSIS — M54.32 LEFT SIDED SCIATICA: ICD-10-CM

## 2018-07-18 DIAGNOSIS — Z47.89 AFTERCARE FOLLOWING SURGERY OF THE MUSCULOSKELETAL SYSTEM: ICD-10-CM

## 2018-07-18 PROCEDURE — 72148 MRI LUMBAR SPINE W/O DYE: CPT

## 2018-08-16 RX ORDER — CLONIDINE HYDROCHLORIDE 0.1 MG/1
TABLET ORAL
Qty: 60 TABLET | Refills: 3 | Status: SHIPPED | OUTPATIENT
Start: 2018-08-16 | End: 2018-12-14 | Stop reason: SDUPTHER

## 2018-08-29 ENCOUNTER — OFFICE VISIT (OUTPATIENT)
Dept: FAMILY MEDICINE CLINIC | Age: 53
End: 2018-08-29
Payer: COMMERCIAL

## 2018-08-29 VITALS
SYSTOLIC BLOOD PRESSURE: 126 MMHG | RESPIRATION RATE: 12 BRPM | HEIGHT: 70 IN | DIASTOLIC BLOOD PRESSURE: 82 MMHG | BODY MASS INDEX: 29.75 KG/M2 | TEMPERATURE: 99.1 F | HEART RATE: 68 BPM | WEIGHT: 207.8 LBS

## 2018-08-29 DIAGNOSIS — I10 ESSENTIAL HYPERTENSION: Primary | Chronic | ICD-10-CM

## 2018-08-29 DIAGNOSIS — M15.9 PRIMARY OSTEOARTHRITIS INVOLVING MULTIPLE JOINTS: ICD-10-CM

## 2018-08-29 DIAGNOSIS — Z98.1 S/P LUMBAR FUSION: ICD-10-CM

## 2018-08-29 PROCEDURE — 1036F TOBACCO NON-USER: CPT | Performed by: NURSE PRACTITIONER

## 2018-08-29 PROCEDURE — G8417 CALC BMI ABV UP PARAM F/U: HCPCS | Performed by: NURSE PRACTITIONER

## 2018-08-29 PROCEDURE — 99213 OFFICE O/P EST LOW 20 MIN: CPT | Performed by: NURSE PRACTITIONER

## 2018-08-29 PROCEDURE — 3017F COLORECTAL CA SCREEN DOC REV: CPT | Performed by: NURSE PRACTITIONER

## 2018-08-29 PROCEDURE — G8427 DOCREV CUR MEDS BY ELIG CLIN: HCPCS | Performed by: NURSE PRACTITIONER

## 2018-08-29 RX ORDER — CYCLOBENZAPRINE HCL 10 MG
1 TABLET ORAL DAILY PRN
COMMUNITY
Start: 2018-08-29 | End: 2019-03-25 | Stop reason: ALTCHOICE

## 2018-08-29 RX ORDER — MELOXICAM 15 MG/1
15 TABLET ORAL DAILY
Qty: 90 TABLET | Refills: 3 | Status: SHIPPED | OUTPATIENT
Start: 2018-08-29 | End: 2019-03-25

## 2018-09-11 ASSESSMENT — ENCOUNTER SYMPTOMS
BACK PAIN: 1
EYES NEGATIVE: 1
GASTROINTESTINAL NEGATIVE: 1
RESPIRATORY NEGATIVE: 1

## 2018-11-01 RX ORDER — METOPROLOL TARTRATE 50 MG/1
TABLET, FILM COATED ORAL
Qty: 60 TABLET | Refills: 5 | Status: SHIPPED | OUTPATIENT
Start: 2018-11-01 | End: 2019-03-25 | Stop reason: SDUPTHER

## 2018-12-14 RX ORDER — CLONIDINE HYDROCHLORIDE 0.1 MG/1
TABLET ORAL
Qty: 180 TABLET | Refills: 2 | Status: SHIPPED | OUTPATIENT
Start: 2018-12-14 | End: 2019-09-18 | Stop reason: SDUPTHER

## 2019-03-25 ENCOUNTER — OFFICE VISIT (OUTPATIENT)
Dept: FAMILY MEDICINE CLINIC | Age: 54
End: 2019-03-25
Payer: COMMERCIAL

## 2019-03-25 VITALS
SYSTOLIC BLOOD PRESSURE: 142 MMHG | TEMPERATURE: 98.4 F | OXYGEN SATURATION: 98 % | HEART RATE: 59 BPM | DIASTOLIC BLOOD PRESSURE: 82 MMHG | WEIGHT: 214.4 LBS | BODY MASS INDEX: 30.69 KG/M2 | HEIGHT: 70 IN | RESPIRATION RATE: 16 BRPM

## 2019-03-25 DIAGNOSIS — R06.83 SNORING: ICD-10-CM

## 2019-03-25 DIAGNOSIS — E78.2 MIXED HYPERLIPIDEMIA: ICD-10-CM

## 2019-03-25 DIAGNOSIS — B35.1 ONYCHOMYCOSIS: ICD-10-CM

## 2019-03-25 DIAGNOSIS — Z98.1 S/P LUMBAR FUSION: ICD-10-CM

## 2019-03-25 DIAGNOSIS — J34.2 NASAL SEPTAL DEVIATION: ICD-10-CM

## 2019-03-25 DIAGNOSIS — R63.4 WEIGHT LOSS: ICD-10-CM

## 2019-03-25 DIAGNOSIS — M25.541 ARTHRALGIA OF BOTH HANDS: ICD-10-CM

## 2019-03-25 DIAGNOSIS — R73.01 IMPAIRED FASTING GLUCOSE: ICD-10-CM

## 2019-03-25 DIAGNOSIS — R53.83 FATIGUE, UNSPECIFIED TYPE: ICD-10-CM

## 2019-03-25 DIAGNOSIS — H53.9 VISION DISTURBANCE: ICD-10-CM

## 2019-03-25 DIAGNOSIS — H91.93 DECREASED HEARING OF BOTH EARS: ICD-10-CM

## 2019-03-25 DIAGNOSIS — I10 ESSENTIAL HYPERTENSION: Primary | ICD-10-CM

## 2019-03-25 DIAGNOSIS — H65.23 BILATERAL CHRONIC SEROUS OTITIS MEDIA: ICD-10-CM

## 2019-03-25 DIAGNOSIS — M25.542 ARTHRALGIA OF BOTH HANDS: ICD-10-CM

## 2019-03-25 DIAGNOSIS — M15.9 PRIMARY OSTEOARTHRITIS INVOLVING MULTIPLE JOINTS: ICD-10-CM

## 2019-03-25 DIAGNOSIS — M48.061 DEGENERATIVE LUMBAR SPINAL STENOSIS: ICD-10-CM

## 2019-03-25 DIAGNOSIS — G47.30 SLEEP APNEA, UNSPECIFIED TYPE: ICD-10-CM

## 2019-03-25 DIAGNOSIS — R61 NIGHT SWEATS: ICD-10-CM

## 2019-03-25 PROCEDURE — 3017F COLORECTAL CA SCREEN DOC REV: CPT | Performed by: NURSE PRACTITIONER

## 2019-03-25 PROCEDURE — G8427 DOCREV CUR MEDS BY ELIG CLIN: HCPCS | Performed by: NURSE PRACTITIONER

## 2019-03-25 PROCEDURE — 99214 OFFICE O/P EST MOD 30 MIN: CPT | Performed by: NURSE PRACTITIONER

## 2019-03-25 PROCEDURE — 1036F TOBACCO NON-USER: CPT | Performed by: NURSE PRACTITIONER

## 2019-03-25 PROCEDURE — G8482 FLU IMMUNIZE ORDER/ADMIN: HCPCS | Performed by: NURSE PRACTITIONER

## 2019-03-25 PROCEDURE — G8417 CALC BMI ABV UP PARAM F/U: HCPCS | Performed by: NURSE PRACTITIONER

## 2019-03-25 RX ORDER — CELECOXIB 100 MG/1
100 CAPSULE ORAL 2 TIMES DAILY
Qty: 60 CAPSULE | Refills: 0 | Status: SHIPPED | OUTPATIENT
Start: 2019-03-25 | End: 2019-03-28 | Stop reason: ALTCHOICE

## 2019-03-25 RX ORDER — CETIRIZINE HYDROCHLORIDE 10 MG/1
10 TABLET ORAL DAILY
Qty: 30 TABLET | Refills: 3 | Status: SHIPPED | OUTPATIENT
Start: 2019-03-25 | End: 2019-07-26

## 2019-03-25 RX ORDER — METOPROLOL TARTRATE 75 MG/1
TABLET, FILM COATED ORAL
Qty: 60 TABLET | Refills: 3 | Status: SHIPPED | OUTPATIENT
Start: 2019-03-25 | End: 2019-04-30 | Stop reason: CLARIF

## 2019-03-25 RX ORDER — TERBINAFINE HYDROCHLORIDE 250 MG/1
250 TABLET ORAL DAILY
Qty: 84 TABLET | Refills: 0 | Status: SHIPPED | OUTPATIENT
Start: 2019-03-25 | End: 2019-06-17

## 2019-03-28 RX ORDER — NAPROXEN 500 MG/1
500 TABLET ORAL 2 TIMES DAILY WITH MEALS
Qty: 60 TABLET | Refills: 5 | Status: SHIPPED | OUTPATIENT
Start: 2019-03-28 | End: 2019-09-09 | Stop reason: ALTCHOICE

## 2019-03-28 ASSESSMENT — ENCOUNTER SYMPTOMS
GASTROINTESTINAL NEGATIVE: 1
EYES NEGATIVE: 1
RESPIRATORY NEGATIVE: 1
BACK PAIN: 1

## 2019-04-02 ENCOUNTER — TELEPHONE (OUTPATIENT)
Dept: FAMILY MEDICINE CLINIC | Age: 54
End: 2019-04-02

## 2019-04-02 NOTE — TELEPHONE ENCOUNTER
Patient states that for the last couple of days, mainly in the mornings, he has been feeling more tired and co-workers have noticed some paleness in his skin color. States that today, he feels better, but still a little less than his usual.  On 4/1/19 pt says that he took his BP and it was 111/61, in which he says is low for him. Wants to discuss the clonidine, whether or not he needs to continue to take, and the metoprolol 75mg bid. Please advise.   DOLV:  3/25/19

## 2019-04-08 ENCOUNTER — HOSPITAL ENCOUNTER (OUTPATIENT)
Age: 54
Discharge: HOME | End: 2019-04-08
Payer: COMMERCIAL

## 2019-04-08 DIAGNOSIS — M25.542 ARTHRALGIA OF BOTH HANDS: ICD-10-CM

## 2019-04-08 DIAGNOSIS — R63.4 WEIGHT LOSS: ICD-10-CM

## 2019-04-08 DIAGNOSIS — M25.541 ARTHRALGIA OF BOTH HANDS: ICD-10-CM

## 2019-04-08 DIAGNOSIS — E78.2 MIXED HYPERLIPIDEMIA: ICD-10-CM

## 2019-04-08 DIAGNOSIS — R73.01 IMPAIRED FASTING GLUCOSE: ICD-10-CM

## 2019-04-08 DIAGNOSIS — H53.9 VISION DISTURBANCE: ICD-10-CM

## 2019-04-08 DIAGNOSIS — R61 NIGHT SWEATS: ICD-10-CM

## 2019-04-08 LAB
ALBUMIN SERPL-MCNC: 4.5 G/DL (ref 3.5–5.1)
ALP BLD-CCNC: 41 U/L (ref 38–126)
ALT SERPL-CCNC: 13 U/L (ref 11–66)
ANION GAP SERPL CALCULATED.3IONS-SCNC: 12 MEQ/L (ref 8–16)
AST SERPL-CCNC: 14 U/L (ref 5–40)
AVERAGE GLUCOSE: 117 MG/DL (ref 70–126)
BASOPHILS # BLD: 0.3 %
BASOPHILS ABSOLUTE: 0 THOU/MM3 (ref 0–0.1)
BILIRUB SERPL-MCNC: 1 MG/DL (ref 0.3–1.2)
BUN BLDV-MCNC: 23 MG/DL (ref 7–22)
C-REACTIVE PROTEIN: 0.07 MG/DL (ref 0–1)
CALCIUM SERPL-MCNC: 8.8 MG/DL (ref 8.5–10.5)
CHLORIDE BLD-SCNC: 103 MEQ/L (ref 98–111)
CHOLESTEROL, TOTAL: 186 MG/DL (ref 100–199)
CO2: 23 MEQ/L (ref 23–33)
CREAT SERPL-MCNC: 1.1 MG/DL (ref 0.4–1.2)
EOSINOPHIL # BLD: 1.9 %
EOSINOPHILS ABSOLUTE: 0.1 THOU/MM3 (ref 0–0.4)
ERYTHROCYTE [DISTWIDTH] IN BLOOD BY AUTOMATED COUNT: 15.6 % (ref 11.5–14.5)
ERYTHROCYTE [DISTWIDTH] IN BLOOD BY AUTOMATED COUNT: 50.9 FL (ref 35–45)
GFR SERPL CREATININE-BSD FRML MDRD: 69 ML/MIN/1.73M2
GLUCOSE BLD-MCNC: 105 MG/DL (ref 70–108)
HBA1C MFR BLD: 5.9 % (ref 4.4–6.4)
HCT VFR BLD CALC: 36.2 % (ref 42–52)
HDLC SERPL-MCNC: 62 MG/DL
HEMOGLOBIN: 11.8 GM/DL (ref 14–18)
IMMATURE GRANS (ABS): 0.01 THOU/MM3 (ref 0–0.07)
IMMATURE GRANULOCYTES: 0.3 %
LDL CHOLESTEROL CALCULATED: 108 MG/DL
LYMPHOCYTES # BLD: 49.7 %
LYMPHOCYTES ABSOLUTE: 1.8 THOU/MM3 (ref 1–4.8)
MCH RBC QN AUTO: 29.9 PG (ref 26–33)
MCHC RBC AUTO-ENTMCNC: 32.6 GM/DL (ref 32.2–35.5)
MCV RBC AUTO: 91.6 FL (ref 80–94)
MONOCYTES # BLD: 16.9 %
MONOCYTES ABSOLUTE: 0.6 THOU/MM3 (ref 0.4–1.3)
NUCLEATED RED BLOOD CELLS: 0 /100 WBC
PLATELET # BLD: 243 THOU/MM3 (ref 130–400)
PMV BLD AUTO: 9 FL (ref 9.4–12.4)
POTASSIUM SERPL-SCNC: 4.7 MEQ/L (ref 3.5–5.2)
RBC # BLD: 3.95 MILL/MM3 (ref 4.7–6.1)
RHEUMATOID FACTOR: 12 IU/ML (ref 0–13)
SEDIMENTATION RATE, ERYTHROCYTE: 5 MM/HR (ref 0–10)
SEG NEUTROPHILS: 30.9 %
SEGMENTED NEUTROPHILS ABSOLUTE COUNT: 1.1 THOU/MM3 (ref 1.8–7.7)
SODIUM BLD-SCNC: 138 MEQ/L (ref 135–145)
T4 FREE: 0.96 NG/DL (ref 0.93–1.76)
TOTAL PROTEIN: 6.9 G/DL (ref 6.1–8)
TRIGL SERPL-MCNC: 82 MG/DL (ref 0–199)
TSH SERPL DL<=0.05 MIU/L-ACNC: 2.26 UIU/ML (ref 0.4–4.2)
WBC # BLD: 3.7 THOU/MM3 (ref 4.8–10.8)

## 2019-04-08 PROCEDURE — 80061 LIPID PANEL: CPT

## 2019-04-08 PROCEDURE — 36415 COLL VENOUS BLD VENIPUNCTURE: CPT

## 2019-04-08 PROCEDURE — 84443 ASSAY THYROID STIM HORMONE: CPT

## 2019-04-08 PROCEDURE — 85610 PROTHROMBIN TIME: CPT

## 2019-04-08 PROCEDURE — 83036 HEMOGLOBIN GLYCOSYLATED A1C: CPT

## 2019-04-08 PROCEDURE — 85025 COMPLETE CBC W/AUTO DIFF WBC: CPT

## 2019-04-08 PROCEDURE — 85613 RUSSELL VIPER VENOM DILUTED: CPT

## 2019-04-08 PROCEDURE — 85651 RBC SED RATE NONAUTOMATED: CPT

## 2019-04-08 PROCEDURE — 86038 ANTINUCLEAR ANTIBODIES: CPT

## 2019-04-08 PROCEDURE — 85730 THROMBOPLASTIN TIME PARTIAL: CPT

## 2019-04-08 PROCEDURE — 86140 C-REACTIVE PROTEIN: CPT

## 2019-04-08 PROCEDURE — 86431 RHEUMATOID FACTOR QUANT: CPT

## 2019-04-08 PROCEDURE — 80053 COMPREHEN METABOLIC PANEL: CPT

## 2019-04-08 PROCEDURE — 84439 ASSAY OF FREE THYROXINE: CPT

## 2019-04-08 RX ORDER — OMEPRAZOLE 40 MG/1
40 CAPSULE, DELAYED RELEASE ORAL DAILY PRN
Qty: 90 CAPSULE | Refills: 3 | Status: SHIPPED | OUTPATIENT
Start: 2019-04-08 | End: 2020-05-04

## 2019-04-09 ENCOUNTER — TELEPHONE (OUTPATIENT)
Dept: FAMILY MEDICINE CLINIC | Age: 54
End: 2019-04-09

## 2019-04-09 DIAGNOSIS — D64.9 ANEMIA, UNSPECIFIED TYPE: Primary | ICD-10-CM

## 2019-04-09 NOTE — TELEPHONE ENCOUNTER
----- Message from KASANDRA Gracia CNP sent at 4/9/2019  7:56 AM EDT -----  CRP, sed rate, rheumatoid factor and thyroid, CMP within normal limits. Mild elevation of cholesterol 250. Recommend low-fat high-fiber diet. CBC is stable from previous counts. He does indicate mild anemia.   Please have patient get iron and TIBC and B12 checked

## 2019-04-09 NOTE — TELEPHONE ENCOUNTER
Pt was informed of lab results and to get iron, tibc and b12 drawn. Orders were faxed to [facility].

## 2019-04-12 LAB
ANA SCREEN: NORMAL
DRVVT 1:1 MIX: ABNORMAL SEC (ref 33–44)
DRVVT CONFIRMATION TEST: ABNORMAL RATIO
DRVVT SCREEN: 31 SEC (ref 33–44)
HEXAGONAL PHOSPHOLIPID NEUTRALIZAT TEST: ABNORMAL
LUPUS ANTICOAG INTERP: ABNORMAL
PLATELET NEUTRALIZATION: ABNORMAL
PROTHROMBIN TIME: 12.4 SEC (ref 12–15.5)
PTT 1:1 MIX: ABNORMAL SEC (ref 32–48)
PTT LUPUS ANTICOAGULANT: 44 SEC (ref 32–48)
PTT-HEPARIN NEUTRALIZED: ABNORMAL SEC (ref 32–48)
REPTILASE TIME: ABNORMAL SEC
THROMBIN TIME: ABNORMAL SEC (ref 14.7–19.5)

## 2019-04-15 ENCOUNTER — HOSPITAL ENCOUNTER (OUTPATIENT)
Age: 54
Discharge: HOME | End: 2019-04-15
Payer: COMMERCIAL

## 2019-04-15 DIAGNOSIS — D64.9 ANEMIA, UNSPECIFIED TYPE: ICD-10-CM

## 2019-04-15 LAB
IRON: 98 UG/DL (ref 65–195)
TOTAL IRON BINDING CAPACITY: 333 UG/DL (ref 171–450)
VITAMIN B-12: 300 PG/ML (ref 211–911)

## 2019-04-15 PROCEDURE — 82607 VITAMIN B-12: CPT

## 2019-04-15 PROCEDURE — 36415 COLL VENOUS BLD VENIPUNCTURE: CPT

## 2019-04-15 PROCEDURE — 83550 IRON BINDING TEST: CPT

## 2019-04-15 PROCEDURE — 83540 ASSAY OF IRON: CPT

## 2019-04-22 ENCOUNTER — TELEPHONE (OUTPATIENT)
Dept: FAMILY MEDICINE CLINIC | Age: 54
End: 2019-04-22

## 2019-04-22 NOTE — TELEPHONE ENCOUNTER
----- Message from KASANDRA Gibbons CNP sent at 4/16/2019  8:32 AM EDT -----  B12 and iron both at the low end of normal.  Recommend a B complex vitamin and iron supplement daily over-the-counter. Before starting supplements like to check Hemoccult.   Please send patient fit test

## 2019-04-29 ENCOUNTER — TELEPHONE (OUTPATIENT)
Dept: FAMILY MEDICINE CLINIC | Age: 54
End: 2019-04-29

## 2019-04-29 DIAGNOSIS — M15.9 PRIMARY OSTEOARTHRITIS INVOLVING MULTIPLE JOINTS: Primary | ICD-10-CM

## 2019-04-29 NOTE — TELEPHONE ENCOUNTER
Patient is calling in regarding the naproxen that he was started on for his achy stiff joints. He said so far it is not working or helping. Please advise what may be the next step.

## 2019-04-30 ENCOUNTER — TELEPHONE (OUTPATIENT)
Dept: FAMILY MEDICINE CLINIC | Age: 54
End: 2019-04-30

## 2019-04-30 NOTE — TELEPHONE ENCOUNTER
Raghu Vanegas pharmacist from [facility] called stating Rx from 3/25/19 on metoprolol tartrate 75mg bid states it does not come in 75mg. It comes in 25mg, 50mg.  OK to fill 25mg bid and 50mg bid #60/3rf?

## 2019-05-01 RX ORDER — METOPROLOL TARTRATE 50 MG/1
50 TABLET, FILM COATED ORAL 2 TIMES DAILY
Qty: 60 TABLET | Refills: 3 | Status: SHIPPED | OUTPATIENT
Start: 2019-05-01 | End: 2019-07-26

## 2019-05-01 NOTE — TELEPHONE ENCOUNTER
Ramiro Sanchez pharmacist from [facility] called stating Rx from 3/25/19 on metoprolol tartrate 75mg bid states it does not come in 75mg. It comes in 25mg, 50mg.  OK to fill 25mg bid and 50mg bid #60/3rf?

## 2019-05-13 ENCOUNTER — HOSPITAL ENCOUNTER (EMERGENCY)
Age: 54
Discharge: HOME | End: 2019-05-13
Attending: NURSE PRACTITIONER
Payer: COMMERCIAL

## 2019-05-13 VITALS
RESPIRATION RATE: 14 BRPM | BODY MASS INDEX: 31.5 KG/M2 | OXYGEN SATURATION: 98 % | HEART RATE: 58 BPM | DIASTOLIC BLOOD PRESSURE: 88 MMHG | SYSTOLIC BLOOD PRESSURE: 153 MMHG | WEIGHT: 220 LBS | HEIGHT: 70 IN | TEMPERATURE: 98.1 F

## 2019-05-13 DIAGNOSIS — K08.89 DENTALGIA: Primary | ICD-10-CM

## 2019-05-13 PROCEDURE — 99213 OFFICE O/P EST LOW 20 MIN: CPT | Performed by: NURSE PRACTITIONER

## 2019-05-13 PROCEDURE — 99212 OFFICE O/P EST SF 10 MIN: CPT

## 2019-05-13 RX ORDER — GABAPENTIN 300 MG/1
300 CAPSULE ORAL 3 TIMES DAILY
COMMUNITY
End: 2019-07-26

## 2019-05-13 RX ORDER — PENICILLIN V POTASSIUM 500 MG/1
500 TABLET ORAL 4 TIMES DAILY
Qty: 40 TABLET | Refills: 0 | Status: SHIPPED | OUTPATIENT
Start: 2019-05-13 | End: 2019-05-23

## 2019-05-13 ASSESSMENT — PAIN DESCRIPTION - PROGRESSION: CLINICAL_PROGRESSION: GRADUALLY WORSENING

## 2019-05-13 ASSESSMENT — PAIN - FUNCTIONAL ASSESSMENT: PAIN_FUNCTIONAL_ASSESSMENT: PREVENTS OR INTERFERES SOME ACTIVE ACTIVITIES AND ADLS

## 2019-05-13 ASSESSMENT — ENCOUNTER SYMPTOMS
TROUBLE SWALLOWING: 0
NAUSEA: 0
SINUS PRESSURE: 0
SINUS PAIN: 0
VOMITING: 0
FACIAL SWELLING: 0

## 2019-05-13 ASSESSMENT — PAIN DESCRIPTION - PAIN TYPE: TYPE: ACUTE PAIN

## 2019-05-13 ASSESSMENT — PAIN SCALES - GENERAL: PAINLEVEL_OUTOF10: 7

## 2019-05-13 ASSESSMENT — PAIN DESCRIPTION - DESCRIPTORS: DESCRIPTORS: OTHER (COMMENT)

## 2019-05-13 ASSESSMENT — PAIN DESCRIPTION - LOCATION: LOCATION: TEETH

## 2019-05-13 ASSESSMENT — PAIN DESCRIPTION - ORIENTATION: ORIENTATION: RIGHT;LOWER

## 2019-05-13 ASSESSMENT — PAIN DESCRIPTION - FREQUENCY: FREQUENCY: INTERMITTENT

## 2019-05-13 NOTE — ED PROVIDER NOTES
[address]  Urgent Care Encounter      CHIEF COMPLAINT       Chief Complaint   Patient presents with    Dental Pain     broke tooth saturday        Nurses Notes reviewed and I agree except as noted in the HPI. HISTORY OF PRESENT ILLNESS   Liz Nuñez is a 62 y.o. male who presents with dental pain in the right lower molar. He said he had pain and problems without molar 3-4 weeks ago and had an appointment at a dental practice in [address] [place] [address]. When he went to the appointment he found out the practice had closed. He has not been able to find another practice that except his back I insurance. He denies any fever, chills or any other symptoms. He is in no acute distress. REVIEW OF SYSTEMS     Review of Systems   Constitutional: Negative for appetite change, chills and fever. HENT: Positive for dental problem (right lower 2nd molar). Negative for facial swelling, sinus pressure, sinus pain and trouble swallowing. Gastrointestinal: Negative for nausea and vomiting. Musculoskeletal: Negative for neck pain and neck stiffness. Neurological: Negative for headaches. PAST MEDICAL HISTORY         Diagnosis Date    Depression     Erectile dysfunction     Fatigue     Hyperlipidemia     Hypertension     Seasonal allergies     Xeroderma        SURGICAL HISTORY     Patient  has a past surgical history that includes Vasectomy (12/07); Spine surgery (12/08); knee surgery (2010); Foot surgery (2006); hernia repair (Right, 2004); Neck surgery; knee surgery; Tooth Extraction (Left, 5-1-15); Colonoscopy (09/09/2016); Tonsillectomy; and pr spine surgery procedure unlisted (N/A, 6/15/2018). CURRENT MEDICATIONS       Discharge Medication List as of 5/13/2019  5:35 PM      CONTINUE these medications which have NOT CHANGED    Details   gabapentin (NEURONTIN) 300 MG capsule Take 300 mg by mouth 3 times daily. Historical Med      !! metoprolol tartrate (LOPRESSOR) 25 MG tablet Take 1 tablet by mouth 2 times daily, Disp-60 tablet, R-3Normal      !! metoprolol tartrate (LOPRESSOR) 50 MG tablet Take 1 tablet by mouth 2 times daily, Disp-60 tablet, R-3Normal      omeprazole (PRILOSEC) 40 MG delayed release capsule Take 1 capsule by mouth daily as needed (stomach acid), Disp-90 capsule, R-3Normal      terbinafine (LAMISIL) 250 MG tablet Take 1 tablet by mouth daily, Disp-84 tablet, R-0Normal      cetirizine (ZYRTEC) 10 MG tablet Take 1 tablet by mouth daily, Disp-30 tablet, R-3Normal      cloNIDine (CATAPRES) 0.1 MG tablet take 1 tablet by mouth twice a day, Disp-180 tablet, R-2Normal      lidocaine (XYLOCAINE) 2 % jelly Apply 3 times daily, Disp-85 g, R-5, Normal      losartan (COZAAR) 100 MG tablet TAKE ONE TABLET BY MOUTH EVERY DAY, Disp-90 tablet, R-3Normal      acetaminophen (TYLENOL 8 HOUR) 650 MG extended release tablet Take 1,300 mg by mouth 2 times dailyHistorical Med      naproxen (EC NAPROSYN) 500 MG EC tablet Take 1 tablet by mouth 2 times daily (with meals), Disp-60 tablet, R-5Normal       !! - Potential duplicate medications found. Please discuss with provider. ALLERGIES     Patient is is allergic to amlodipine. FAMILY HISTORY     Patient'sfamily history includes Cancer in his father and maternal grandmother; Diabetes in his brother, mother, and sister; Heart Disease in his brother, maternal grandmother, mother, and paternal grandmother. SOCIAL HISTORY     Patient  reports that he quit smoking about 23 years ago. His smoking use included cigarettes. He has a 10.00 pack-year smoking history. He has never used smokeless tobacco. He reports that he drinks alcohol. He reports that he does not use drugs. PHYSICAL EXAM     ED TRIAGE VITALS  BP: (!) 153/88, Temp: 98.1 °F (36.7 °C), Pulse: 58, Resp: 14, SpO2: 98 %  Physical Exam   Constitutional: He is oriented to person, place, and time. He appears well-developed and well-nourished. HENT:   Head: Normocephalic and atraumatic. Mouth/Throat: Uvula is midline. Cardiovascular: Normal rate, regular rhythm and normal heart sounds. No murmur heard. Pulmonary/Chest: Effort normal and breath sounds normal.   Neurological: He is alert and oriented to person, place, and time. Skin: Skin is warm and dry. Nursing note and vitals reviewed. DIAGNOSTIC RESULTS   Labs: No results found for this visit on 05/13/19. IMAGING:    URGENT CARE COURSE:     Vitals:    05/13/19 1650   BP: (!) 153/88   Pulse: 58   Resp: 14   Temp: 98.1 °F (36.7 °C)   TempSrc: Temporal   SpO2: 98%   Weight: 220 lb (99.8 kg)   Height: 5' 10\" (1.778 m)       Medications - No data to display  PROCEDURES:  None  FINAL IMPRESSION      1. Dentalgia        DISPOSITION/PLAN   DISPOSITION Decision To Discharge 05/13/2019 05:31:39 PM  Patient presented with recurring dental pain and probable infection. I'm going to start him on some penicillin VK. I did give him 2 phone numbers for dental practices in [address] [place] [address]. He should attempt to get into a dental practices soon as possible. He was given an off work slip for tomorrow if needed.   PATIENT REFERRED TO:  Amelia Wallace MD  [address] [facility] [address] [phone]            Dental practices     [facility] [phone]  [id number] [phone]  Schedule an appointment as soon as possible for a visit   for evaluation    DISCHARGE MEDICATIONS:  Discharge Medication List as of 5/13/2019  5:35 PM      START taking these medications    Details   penicillin v potassium (VEETID) 500 MG tablet Take 1 tablet by mouth 4 times daily for 10 days, Disp-40 tablet, R-0Print           Discharge Medication List as of 5/13/2019  5:35 PM          Nithin Prabhakar, [address] [facility], APRN - CNP  05/13/19 3921

## 2019-06-05 RX ORDER — LOSARTAN POTASSIUM 100 MG/1
TABLET ORAL
Qty: 90 TABLET | Refills: 3 | Status: SHIPPED | OUTPATIENT
Start: 2019-06-05 | End: 2019-07-26

## 2019-06-06 ENCOUNTER — HOSPITAL ENCOUNTER (OUTPATIENT)
Dept: PHYSICAL THERAPY | Age: 54
Setting detail: THERAPIES SERIES
Discharge: HOME | End: 2019-06-06
Payer: COMMERCIAL

## 2019-06-06 PROCEDURE — 97161 PT EVAL LOW COMPLEX 20 MIN: CPT

## 2019-06-06 PROCEDURE — 97110 THERAPEUTIC EXERCISES: CPT

## 2019-06-06 ASSESSMENT — PAIN DESCRIPTION - LOCATION: LOCATION: BACK;LEG

## 2019-06-06 ASSESSMENT — PAIN DESCRIPTION - ORIENTATION: ORIENTATION: RIGHT;LEFT

## 2019-06-06 ASSESSMENT — PAIN SCALES - GENERAL: PAINLEVEL_OUTOF10: 6

## 2019-06-06 NOTE — FLOWSHEET NOTE
** PLEASE SIGN, DATE AND TIME CERTIFICATION BELOW AND RETURN TO [facility] OUTPATIENT REHABILITATION (FAX #: [phone]). ATTEST/CO-SIGN IF ACCESSING VIA [employer]. THANK YOU.**    I certify that I have examined the patient below and determined that Physical Medicine and Rehabilitation service is necessary and that I approve the established plan of care for up to 90 days or as specifically noted. Attestation, signature or co-signature of physician indicates approval of certification requirements.    ________________________ ____________ __________  Physician Signature   Date   Time       [address]     Time In: 6977  Time Out: 1600  Minutes: 55  Timed Code Treatment Minutes: 25 Minutes                Date: 2019  Patient Name: Delia Ulrich,  Gender:  male        CSN: [id number]   : 1965  (62 y.o.)  Referral Date : 19     Referring Practitioner: Dr. Corry Pinzon      Diagnosis: spondylolisthesis of lumbar region, pain in right and left leg, B LE numbness  Treatment Diagnosis: LBP, difficulty walking  Additional Pertinent Hx: see medical history questionnaire, reviewed meds and allergies. Lumbar surgery Aug 2018, neck surgery , 3 knee surgeries. , . General:  PT Visit Information  Onset Date: 19  PT Insurance Information: [employer]- pays at 100%, modalities covered except ionto/MHP/CP not covered. 30 visits per calendar year  Total # of Visits Approved: 30  Total # of Visits to Date: 1  Plan of Care/Certification Expiration Date: 19  Progress Note Due Date: 19  Progress Note Counter: 1/10                Position Activity Restriction  Other position/activity restrictions: none       See Medical History Questionnaire for information related to allergies and medications.     Subjective:  Chart Reviewed: Yes  Patient assessed for rehabilitation services?: Yes  Response To Previous Treatment: Not applicable  Family / Caregiver Present: No  Comments: 1/10 for PN. cert due 7/69.  no f/u with MD scheduled. does have consult with pain management MD and sleep MD on 7/16. Subjective: Patient reports having LBP x 3 year. Patient reports he had lumbar surgery on 6/8/18. pain woke up from surgery with pain in both legs, groin,    Reports LBP remained 5/10 after surgery, B LE pain increased 2 months ago. Neida Escoto Gill and PT ordered, consult with pain management on 7/16. does also have h      Pain:  Patient Currently in Pain: Yes  Pain Assessment: 0-10  Pain Level: 6  Pain Location: Back, Leg  Pain Orientation: Right, Left  Pain Radiating Towards: % of the day, high 9/10, average 5/10. B LE groin and leg pain 100% of the day, high 9/10, average 6/10     Social/Functional History:    Type of Home: House  Home Layout: One level  Home Access: Stairs to enter with rails  Entrance Stairs - Number of Steps: 3  Entrance Stairs - Rails: Right     Bathroom Shower/Tub: Tub/Shower unit  Bathroom Toilet: Handicap height                     Occupation: Full time employment  Type of occupation: [employer]- full time - will be changing job at [employer] all day.     Additional Comments: increased LBP with walking x 2 hours at work, increased pain with standing x 5 minutes,  awakening 1 x  x per night due to leg pain    Objective  Overall Orientation Status: Within Normal Limits                         Observation/Palpation  Posture: Fair                                              Lumbar: AROM lumbar flexion 100%, extension limited by 50%, lateral flexion B limited by 25% with with LBP and LE pain 8/10 to right    ROM RLE: AROM R hip flexion  50, knee 0- 120 degrees  ROM LLE: AROM L hip flexion  52 degrees, knee 0- 125 degrees    Strength RLE: Exception  R Hip Flexion: 3+/5  R Knee Flexion: 4-/5  R Knee Extension: 4-/5    Strength LLE: Exception  L Hip Flexion: 3+/5  L Knee Flexion: 4-/5  L Knee Extension: 4-/5    Other: abdominal bracing poor            Ambulation: forward flexed posture with decreased trunk rotation, 100 feet, no assistive device                                                                                               Exercises  Exercise 1: abdominal bracing 10 x 5 seconds  Exercise 2: abdominal bracing with quad set 10 x 5 seconds                          Activity Tolerance:  Activity Tolerance: Patient Tolerated treatment well    Assessment: Body structures, Functions, Activity limitations: Decreased ROM, Decreased strength  Assessment: patient demostrates low complexity evaluation with LBP and B LE pain. lumbar surgery June 2018. PT for AROM, strengthening, modalities prn  Prognosis: Excellent  REQUIRES PT FOLLOW UP: Yes    Patient Education:  Patient Education: PT POC and HEP  Barriers to Learning: none                   Plan:  Times per week: 2 x per week  Plan weeks: 6 weeks  Current Treatment Recommendations: Strengthening, ROM, Home Exercise Program, Modalities, Patient/Caregiver Education & Training    History: Personal factors or comorbidities that impact plan of care - Low Complexity: no personal factors or comorbidities    Examination: Body structures and functions, activity limitations, participation restrictions; using standardized tests and measures - Low Complexity: 1-2 body structures and functional, activity limitation and/or participation restrictions. See restrictions and objective section above for details. Clinical Presentation: Low - Stable and Uncomplicated: LBP, B LE pain    Decision Making: Low Complexity due to LBP, B LE pain. Decision making was based on patient assessment and decision making process in determining plan of care and establishing reasonable expectations for measurable functional outcomes.     Evaluation Complexity: Based on the findings of patient history, examination, clinical presentation, and decision making during this evaluation, the evaluation of Ashley Kline  is of low complexity. Goals:  Patient goals : to get my back feeling better    Short term goals  Time Frame for Short term goals: deferred to LTG's    Long term goals  Time Frame for Long term goals : 6 weeks  Long term goal 1: increase AROM lumbar extension to 100%, B hip flexoin to 65 degrees to allow patient to report able to work x 2 hours with decreased LBP to 4/10  Long term goal 2: increase strength abdominal muscle to fair, B LE to 4-/5 to allow patient to report able to wipe table, countertop off with decreased LBP and LE pain to 4/10  Long term goal 3: I with HEP as prescribed to allow patient to report able to sleep x 4 hours without awakening due to LBP and LE pain    Stephania Grimes Ted # [id number]

## 2019-06-10 ENCOUNTER — HOSPITAL ENCOUNTER (OUTPATIENT)
Dept: PHYSICAL THERAPY | Age: 54
Setting detail: THERAPIES SERIES
Discharge: HOME | End: 2019-06-10
Payer: COMMERCIAL

## 2019-06-10 PROCEDURE — 97110 THERAPEUTIC EXERCISES: CPT

## 2019-06-10 ASSESSMENT — PAIN DESCRIPTION - LOCATION: LOCATION: BACK

## 2019-06-10 ASSESSMENT — PAIN SCALES - GENERAL: PAINLEVEL_OUTOF10: 6

## 2019-06-10 ASSESSMENT — PAIN DESCRIPTION - ORIENTATION: ORIENTATION: RIGHT;LEFT

## 2019-06-10 NOTE — PROGRESS NOTES
[address] [facility]ICAL [facility]  DAILY NOTE  [address] [facility].     Time In: 0350  Time Out: 1810  Minutes: 30  Timed Code Treatment Minutes: 30 Minutes                Date: 6/10/2019  Patient Name: Amber Bobby,  Gender:  male        CSN: [id number]   : 1965  (62 y.o.)  Referral Date : 19    Referring Practitioner: Dr. Lorrie Veras      Diagnosis: spondylolisthesis of lumbar region, pain in right and left leg, B LE numbness  Treatment Diagnosis: LBP, difficulty walking   Additional Pertinent Hx: see medical history questionnaire, reviewed meds and allergies. Lumbar surgery Aug 2018, neck surgery , 3 knee surgeries. , . General:  PT Visit Information  Onset Date: 19  PT Insurance Information: [employer]- pays at 100%, modalities covered except ionto/MHP/CP not covered. 30 visits per calendar year  Total # of Visits Approved: 30  Total # of Visits to Date: 2  Plan of Care/Certification Expiration Date: 19  Progress Note Due Date: 19  Progress Note Counter: 2/10               Subjective:  Chart Reviewed: Yes  Patient assessed for rehabilitation services?: Yes  Response To Previous Treatment: Not applicable  Family / Caregiver Present: No  Comments: 2/10 for PN. cert due 3/46.  no f/u with MD scheduled. does have consult with pain management MD and sleep MD on . 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 PT did inform again that rx is for 2-3 x per week , did question if have has FMLA and is in place for LB, PT advised patient to discuss with HR with FMLA.      Pain:  Patient Currently in Pain: Yes  Pain Assessment: 0-10  Pain Level: 6  Pain Location: Back  Pain Orientation: Right, Left      Objective Exercises  Exercise 1: abdominal bracing 15 x 5 seconds  Exercise 2: abdominal bracing with quad set 15 x 5 seconds  Exercise 3: abdominal bracing with SLR 10 x 5 seconds  Exercise 4: log rolling education with poor technique prior to educating  Exercise 5: postural education, body mechanics to avoid lumbar bending and twisting  Exercise 6: LAQ sitting with abdominal bracing 10 x 5 seconds         Activity Tolerance:  Activity Tolerance: Patient Tolerated treatment well    Assessment: Body structures, Functions, Activity limitations: Decreased ROM, Decreased strength  Assessment: changed schedule to 1 x per week per patient request but did advise patient to talk with HR as FMLA in place  Prognosis: Excellent  REQUIRES PT FOLLOW UP: Yes    Patient Education:  Patient Education: PT POC and HEP  Barriers to Learning: none                      Plan:  Times per week: 2 x per week  Plan weeks: 6 weeks  Current Treatment Recommendations: Strengthening, ROM, Home Exercise Program, Modalities, Patient/Caregiver Education & Training    Goals:  Patient goals : to get my back feeling better    Short term goals  Time Frame for Short term goals: deferred to LTG's    Long term goals  Time Frame for Long term goals : 6 weeks  Long term goal 1: increase AROM lumbar extension to 100%, B hip flexoin to 65 degrees to allow patient to report able to work x 2 hours with decreased LBP to 4/10  Long term goal 2: increase strength abdominal muscle to fair, B LE to 4-/5 to allow patient to report able to wipe table, countertop off with decreased LBP and LE pain to 4/10  Long term goal 3: I with HEP as prescribed to allow patient to report able to sleep x 4 hours without awakening due to LBP and LE pain    Stephania Baileyork # [id number]

## 2019-06-12 ENCOUNTER — APPOINTMENT (OUTPATIENT)
Dept: PHYSICAL THERAPY | Age: 54
End: 2019-06-12
Payer: COMMERCIAL

## 2019-06-17 ENCOUNTER — HOSPITAL ENCOUNTER (OUTPATIENT)
Dept: PHYSICAL THERAPY | Age: 54
Setting detail: THERAPIES SERIES
Discharge: HOME | End: 2019-06-17
Payer: COMMERCIAL

## 2019-06-17 PROCEDURE — 97110 THERAPEUTIC EXERCISES: CPT

## 2019-06-17 ASSESSMENT — PAIN DESCRIPTION - LOCATION: LOCATION: BACK;KNEE

## 2019-06-17 ASSESSMENT — PAIN DESCRIPTION - ORIENTATION: ORIENTATION: RIGHT

## 2019-06-17 ASSESSMENT — PAIN SCALES - GENERAL: PAINLEVEL_OUTOF10: 8

## 2019-06-17 NOTE — PROGRESS NOTES
[address]     Time In: 7229  Time Out: 1740  Minutes: 30  Timed Code Treatment Minutes: 30 Minutes                Date: 2019  Patient Name: Alberto Leyva,  Gender:  male        CSN: [id number]   : 1965  (62 y.o.)  Referral Date : 19    Referring Practitioner: Dr. Regis Gastelum      Diagnosis: spondylolisthesis of lumbar region, pain in right and left leg, B LE numbness  Treatment Diagnosis: LBP, difficulty walking   Additional Pertinent Hx: see medical history questionnaire, reviewed meds and allergies. Lumbar surgery Aug 2018, neck surgery , 3 knee surgeries. , . General:  PT Visit Information  Onset Date: 19  PT Insurance Information: [place] [employer]- pays at 100%, modalities covered except ionto/MHP/CP not covered. 30 visits per calendar year  Total # of Visits Approved: 30  Total # of Visits to Date: 3  Plan of Care/Certification Expiration Date: 19  Progress Note Due Date: 19  Progress Note Counter: 3/10               Subjective:  Chart Reviewed: Yes  Patient assessed for rehabilitation services?: Yes  Response To Previous Treatment: Not applicable  Family / Caregiver Present: No  Comments: 2/10 for PN. cert due .  no f/u with MD scheduled. does have consult with pain management MD and sleep MD on . 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      Pain:  Patient Currently in Pain: Yes  Pain Level: 8  Pain Location: Back, Knee  Pain Orientation: Right      Objective                                                                                                                          Exercises  Exercise 1: abdominal bracing 15 x 5 seconds  Exercise 2: abdominal bracing with quad set 15 x 5 seconds  Exercise 3: abdominal bracing with SLR 15 x 5 seconds  Exercise 4: log rolling reviewed in/out of bed  Exercise 5: postural education, body mechanics to avoid lumbar bending and twisting  Exercise 6: LAQ sitting with abdominal bracing 10 x 5 seconds  Exercise 7: LTR 5 x 5 seconds no increase in pain         Activity Tolerance:  Activity Tolerance: Patient Tolerated treatment well    Assessment: Body structures, Functions, Activity limitations: Decreased ROM, Decreased strength  Assessment: updated HEP to add LTR as long as no increase in pain  Prognosis: Excellent  REQUIRES PT FOLLOW UP: Yes    Patient Education:  Patient Education: PT POC and HEP  Barriers to Learning: none                      Plan:  Times per week: 2 x per week  Plan weeks: 6 weeks  Current Treatment Recommendations: Strengthening, ROM, Home Exercise Program, Modalities, Patient/Caregiver Education & Training    Goals:  Patient goals : to get my back feeling better    Short term goals  Time Frame for Short term goals: deferred to LTG's    Long term goals  Time Frame for Long term goals : 6 weeks  Long term goal 1: increase AROM lumbar extension to 100%, B hip flexoin to 65 degrees to allow patient to report able to work x 2 hours with decreased LBP to 4/10  Long term goal 2: increase strength abdominal muscle to fair, B LE to 4-/5 to allow patient to report able to wipe table, countertop off with decreased LBP and LE pain to 4/10  Long term goal 3: I with HEP as prescribed to allow patient to report able to sleep x 4 hours without awakening due to LBP and LE pain  Stephania Zuniga # [id number]

## 2019-06-18 ENCOUNTER — HOSPITAL ENCOUNTER (EMERGENCY)
Age: 54
Discharge: HOME | End: 2019-06-18
Payer: COMMERCIAL

## 2019-06-18 ENCOUNTER — APPOINTMENT (OUTPATIENT)
Dept: GENERAL RADIOLOGY | Age: 54
End: 2019-06-18
Payer: COMMERCIAL

## 2019-06-18 VITALS
RESPIRATION RATE: 20 BRPM | OXYGEN SATURATION: 97 % | TEMPERATURE: 98.4 F | HEIGHT: 71 IN | SYSTOLIC BLOOD PRESSURE: 148 MMHG | WEIGHT: 215 LBS | BODY MASS INDEX: 30.1 KG/M2 | HEART RATE: 74 BPM | DIASTOLIC BLOOD PRESSURE: 89 MMHG

## 2019-06-18 DIAGNOSIS — C95.00 ACUTE LEUKEMIA NOT HAVING ACHIEVED REMISSION (HCC): Primary | ICD-10-CM

## 2019-06-18 LAB
ABO: NORMAL
ALBUMIN SERPL-MCNC: 4.1 G/DL (ref 3.5–5.1)
ALP BLD-CCNC: 57 U/L (ref 38–126)
ALT SERPL-CCNC: 20 U/L (ref 11–66)
ANION GAP SERPL CALCULATED.3IONS-SCNC: 15 MEQ/L (ref 8–16)
ANTIBODY SCREEN: NORMAL
AST SERPL-CCNC: 21 U/L (ref 5–40)
BASOPHILS # BLD: 0 %
BASOPHILS ABSOLUTE: 0 THOU/MM3 (ref 0–0.1)
BILIRUB SERPL-MCNC: 0.4 MG/DL (ref 0.3–1.2)
BILIRUBIN DIRECT: < 0.2 MG/DL (ref 0–0.3)
BLASTS: 50 %
BUN BLDV-MCNC: 27 MG/DL (ref 7–22)
CALCIUM SERPL-MCNC: 8.9 MG/DL (ref 8.5–10.5)
CHLORIDE BLD-SCNC: 100 MEQ/L (ref 98–111)
CO2: 21 MEQ/L (ref 23–33)
CREAT SERPL-MCNC: 1.2 MG/DL (ref 0.4–1.2)
EKG ATRIAL RATE: 70 BPM
EKG P AXIS: 52 DEGREES
EKG P-R INTERVAL: 174 MS
EKG Q-T INTERVAL: 420 MS
EKG QRS DURATION: 98 MS
EKG QTC CALCULATION (BAZETT): 453 MS
EKG R AXIS: -14 DEGREES
EKG T AXIS: 56 DEGREES
EKG VENTRICULAR RATE: 70 BPM
EOSINOPHIL # BLD: 0 %
EOSINOPHILS ABSOLUTE: 0 THOU/MM3 (ref 0–0.4)
ERYTHROCYTE [DISTWIDTH] IN BLOOD BY AUTOMATED COUNT: 18.8 % (ref 11.5–14.5)
ERYTHROCYTE [DISTWIDTH] IN BLOOD BY AUTOMATED COUNT: 64.5 FL (ref 35–45)
GFR SERPL CREATININE-BSD FRML MDRD: 63 ML/MIN/1.73M2
GLUCOSE BLD-MCNC: 94 MG/DL (ref 70–108)
HCT VFR BLD CALC: 21.9 % (ref 42–52)
HEMOGLOBIN: 7.3 GM/DL (ref 14–18)
IMMATURE GRANS (ABS): 0 THOU/MM3 (ref 0–0.07)
IMMATURE GRANULOCYTES: 0 %
LACTIC ACID, SEPSIS: 0.8 MMOL/L (ref 0.5–1.9)
LYMPHOCYTES # BLD: 17 %
LYMPHOCYTES ABSOLUTE: 6.3 THOU/MM3 (ref 1–4.8)
MCH RBC QN AUTO: 31.9 PG (ref 26–33)
MCHC RBC AUTO-ENTMCNC: 33.3 GM/DL (ref 32.2–35.5)
MCV RBC AUTO: 95.6 FL (ref 80–94)
METAMYELOCYTES: 1 %
MONOCYTES # BLD: 25 %
MONOCYTES ABSOLUTE: 9.2 THOU/MM3 (ref 0.4–1.3)
NUCLEATED RED BLOOD CELLS: 0 /100 WBC
OSMOLALITY CALCULATION: 276.8 MOSMOL/KG (ref 275–300)
PATHOLOGIST REVIEW: ABNORMAL
PLATELET # BLD: 117 THOU/MM3 (ref 130–400)
PMV BLD AUTO: 8.5 FL (ref 9.4–12.4)
POTASSIUM SERPL-SCNC: 4.4 MEQ/L (ref 3.5–5.2)
PRO-BNP: 2848 PG/ML (ref 0–900)
PROCALCITONIN: 0.09 NG/ML (ref 0.01–0.09)
RBC # BLD: 2.29 MILL/MM3 (ref 4.7–6.1)
RH FACTOR: NORMAL
SEG NEUTROPHILS: 7 %
SEGMENTED NEUTROPHILS ABSOLUTE COUNT: 2.6 THOU/MM3 (ref 1.8–7.7)
SODIUM BLD-SCNC: 136 MEQ/L (ref 135–145)
TOTAL PROTEIN: 7.1 G/DL (ref 6.1–8)
TROPONIN T: < 0.01 NG/ML
WBC # BLD: 36.9 THOU/MM3 (ref 4.8–10.8)

## 2019-06-18 PROCEDURE — 99285 EMERGENCY DEPT VISIT HI MDM: CPT

## 2019-06-18 PROCEDURE — 82248 BILIRUBIN DIRECT: CPT

## 2019-06-18 PROCEDURE — 93005 ELECTROCARDIOGRAM TRACING: CPT | Performed by: PHYSICIAN ASSISTANT

## 2019-06-18 PROCEDURE — 84145 PROCALCITONIN (PCT): CPT

## 2019-06-18 PROCEDURE — 83880 ASSAY OF NATRIURETIC PEPTIDE: CPT

## 2019-06-18 PROCEDURE — 83605 ASSAY OF LACTIC ACID: CPT

## 2019-06-18 PROCEDURE — 93010 ELECTROCARDIOGRAM REPORT: CPT | Performed by: INTERNAL MEDICINE

## 2019-06-18 PROCEDURE — 71045 X-RAY EXAM CHEST 1 VIEW: CPT

## 2019-06-18 PROCEDURE — 87040 BLOOD CULTURE FOR BACTERIA: CPT

## 2019-06-18 PROCEDURE — 36415 COLL VENOUS BLD VENIPUNCTURE: CPT

## 2019-06-18 PROCEDURE — 84484 ASSAY OF TROPONIN QUANT: CPT

## 2019-06-18 PROCEDURE — 80053 COMPREHEN METABOLIC PANEL: CPT

## 2019-06-18 PROCEDURE — 85025 COMPLETE CBC W/AUTO DIFF WBC: CPT

## 2019-06-18 PROCEDURE — 86900 BLOOD TYPING SEROLOGIC ABO: CPT

## 2019-06-18 PROCEDURE — 86901 BLOOD TYPING SEROLOGIC RH(D): CPT

## 2019-06-18 PROCEDURE — 86850 RBC ANTIBODY SCREEN: CPT

## 2019-06-18 ASSESSMENT — PAIN SCALES - GENERAL: PAINLEVEL_OUTOF10: 2

## 2019-06-18 ASSESSMENT — PAIN DESCRIPTION - LOCATION: LOCATION: TEETH

## 2019-06-18 NOTE — ED NOTES
Pt comes to the ED with c/o SOB. Pt states that he has been getting tired on exertion. Pt states that his metoprolol dosage has increased by 25 mg a month ago and he has been SOB and more tired since. Pt states that he was seen by his PCP and told that he needed to submit a stool sample due to low iron levels. EKG complete. Respirations are even and unlabored. Will continue to monitor.      Marilyn Mascorro RN  06/18/19 8146

## 2019-06-19 ENCOUNTER — APPOINTMENT (OUTPATIENT)
Dept: PHYSICAL THERAPY | Age: 54
End: 2019-06-19
Payer: COMMERCIAL

## 2019-06-20 ASSESSMENT — ENCOUNTER SYMPTOMS
CONSTIPATION: 0
SHORTNESS OF BREATH: 1
NAUSEA: 0
DIARRHEA: 0
EYE REDNESS: 0
WHEEZING: 0
COLOR CHANGE: 0
VOMITING: 0
BACK PAIN: 0
EYE DISCHARGE: 0
SORE THROAT: 0
RHINORRHEA: 0
COUGH: 0
ABDOMINAL PAIN: 0

## 2019-06-20 NOTE — ED PROVIDER NOTES
[facility] EMERGENCY DEPT      CHIEF COMPLAINT       Chief Complaint   Patient presents with    Shortness of Breath       Nurses Notes reviewed and I agree except asnoted in the HPI. HISTORY OFPRESENT ILLNESS    Toma Nolen is a 62 y.o. male who presents to the emergency department for evaluation of shortness of breath. The patient states that for the past month, he has had intermittent shortness of breath with exertion. He states that this shortness of breath with activity has been getting progressively worse over the past month. He states that shortly prior to the development of his shortness of breath with activity, his metoprolol dosage was increased by 25 mg. He states that he has felt more fatigued and short of breath since his medication change. He also states that his PCP is concerned that he may have low iron levels, and the patient states that he is supposed to submit a stool sample to make sure there is no blood in his stool. The patient denies any chest pain. He denies any recent coughing or URI symptoms. He denies fevers, chills, abdominal pain, nausea, vomiting, or blood in his stool that he has noted. He denies any dizziness or lightheadedness. He denies any weakness, numbness, or tingling. He denies any personal history of heart disease. He does have a history of hypertension and hyperlipidemia. There are no additional complaints at this time. REVIEW OF SYSTEMS      Review of Systems   Constitutional: Positive for fatigue. Negative for chills and fever. HENT: Negative for congestion, ear pain, rhinorrhea and sore throat. Eyes: Negative for discharge and redness. Respiratory: Positive for shortness of breath (with exertion). Negative for cough and wheezing. Cardiovascular: Negative for chest pain and palpitations. Gastrointestinal: Negative for abdominal pain, constipation, diarrhea, nausea and vomiting.    Genitourinary: Negative for decreased urine volume, difficulty urinating and dysuria. Musculoskeletal: Negative for arthralgias, back pain, myalgias, neck pain and neck stiffness. Skin: Negative for color change, pallor and rash. Neurological: Negative for dizziness, syncope, weakness, light-headedness, numbness and headaches. Hematological: Negative for adenopathy. Psychiatric/Behavioral: Negative for agitation, confusion, dysphoric mood and suicidal ideas. The patient is not nervous/anxious. PAST MEDICAL HISTORY    has a past medical history of Depression, Erectile dysfunction, Fatigue, Hyperlipidemia, Hypertension, Seasonal allergies, and Xeroderma. SURGICAL HISTORY      has a past surgical history that includes Vasectomy (12/07); Spine surgery (12/08); knee surgery (2010); Foot surgery (2006); hernia repair (Right, 2004); Neck surgery; knee surgery; Tooth Extraction (Left, 5-1-15); Colonoscopy (09/09/2016); Tonsillectomy; and pr spine surgery procedure unlisted (N/A, 6/15/2018).     CURRENT MEDICATIONS       Discharge Medication List as of 6/18/2019  9:29 PM      CONTINUE these medications which have NOT CHANGED    Details   losartan (COZAAR) 100 MG tablet take 1 tablet by mouth once daily, Disp-90 tablet, R-3Normal      !! metoprolol tartrate (LOPRESSOR) 25 MG tablet Take 1 tablet by mouth 2 times daily, Disp-60 tablet, R-3Normal      !! metoprolol tartrate (LOPRESSOR) 50 MG tablet Take 1 tablet by mouth 2 times daily, Disp-60 tablet, R-3Normal      omeprazole (PRILOSEC) 40 MG delayed release capsule Take 1 capsule by mouth daily as needed (stomach acid), Disp-90 capsule, R-3Normal      naproxen (EC NAPROSYN) 500 MG EC tablet Take 1 tablet by mouth 2 times daily (with meals), Disp-60 tablet, R-5Normal      cetirizine (ZYRTEC) 10 MG tablet Take 1 tablet by mouth daily, Disp-30 tablet, R-3Normal      cloNIDine (CATAPRES) 0.1 MG tablet take 1 tablet by mouth twice a day, Disp-180 tablet, R-2Normal      acetaminophen (TYLENOL 8 HOUR) 650 MG extended release tablet Take 1,300 mg by mouth 2 times dailyHistorical Med      gabapentin (NEURONTIN) 300 MG capsule Take 300 mg by mouth 3 times daily. Historical Med      lidocaine (XYLOCAINE) 2 % jelly Apply 3 times daily, Disp-85 g, R-5, Normal       !! - Potential duplicate medications found. Please discuss with provider. ALLERGIES     is allergic to amlodipine. FAMILY HISTORY     indicated that his mother is . He indicated that his father is . He indicated that all of his three sisters are alive. He indicated that his brother is alive. He indicated that his maternal grandmother is alive. He indicated that his paternal grandmother is alive. @[facility]    SOCIAL HISTORY      reports that he quit smoking about 23 years ago. His smoking use included cigarettes. He has a 10.00 pack-year smoking history. He has never used smokeless tobacco. He reports that he drinks alcohol. He reports that he does not use drugs. PHYSICAL EXAM     INITIAL VITALS:  height is 5' 10.5\" (1.791 m) and weight is 215 lb (97.5 kg). His oral temperature is 98.4 °F (36.9 °C). His blood pressure is 148/89 (abnormal) and his pulse is 74. His respiration is 20 and oxygen saturation is 97%. Physical Exam   Constitutional: He is oriented to person, place, and time. He appears well-developed and well-nourished. No distress. HENT:   Head: Normocephalic and atraumatic. Right Ear: External ear normal.   Left Ear: External ear normal.   Nose: Nose normal.   Mouth/Throat: Oropharynx is clear and moist.   Eyes: Pupils are equal, round, and reactive to light. Conjunctivae and EOM are normal. Right eye exhibits no discharge. Left eye exhibits no discharge. No scleral icterus. Neck: Normal range of motion. Neck supple. Cardiovascular: Normal rate, regular rhythm and normal heart sounds. Exam reveals no gallop and no friction rub. No murmur heard.   Pulmonary/Chest: Effort normal and breath sounds normal. No No definite infiltrate. **This report has been created using voice recognition software. It may contain minor errors which are inherent in voice recognition technology. **      Final report electronically signed by Dr. Deana Godinez on 6/18/2019 8:11 PM          LABS:   Labs Reviewed   CBC WITH AUTO DIFFERENTIAL - Abnormal; Notable for the following components:       Result Value    WBC 36.9 (*)     RBC 2.29 (*)     Hemoglobin 7.3 (*)     Hematocrit 21.9 (*)     MCV 95.6 (*)     RDW-CV 18.8 (*)     RDW-SD 64.5 (*)     Platelets 981 (*)     MPV 8.5 (*)     Lymphocytes # 6.3 (*)     Monocytes # 9.2 (*)     All other components within normal limits   BRAIN NATRIURETIC PEPTIDE - Abnormal; Notable for the following components:    Pro-BNP 2848.0 (*)     All other components within normal limits   BASIC METABOLIC PANEL - Abnormal; Notable for the following components:    CO2 21 (*)     BUN 27 (*)     All other components within normal limits   GLOMERULAR FILTRATION RATE, ESTIMATED - Abnormal; Notable for the following components:    Est, Glom Filt Rate 63 (*)     All other components within normal limits   CULTURE BLOOD #1    Narrative:     Source: blood-Adult-suboptimal <5.5oz./set volume       Site: Peripheral;          Current   Antibiotics: not stated   CULTURE BLOOD #2    Narrative:     Source: blood-Adult-suboptimal <5.5oz./set volume       Site: Peripheral Vein            Current Antibiotics: not stated   HEPATIC FUNCTION PANEL   TROPONIN   ANION GAP   OSMOLALITY   LACTATE, SEPSIS   PROCALCITONIN   TYPE AND SCREEN       EMERGENCY DEPARTMENT COURSE:   Vitals:    Vitals:    06/18/19 1915 06/18/19 1917 06/18/19 2028 06/18/19 2129   BP:  (!) 148/89     Pulse: 63   74   Resp: 18  16 20   Temp: 98.4 °F (36.9 °C)      TempSrc: Oral      SpO2: 99%  99% 97%   Weight: 215 lb (97.5 kg)      Height: 5' 10.5\" (1.791 m)        The patient was seen and evaluated within the ED today with exertional shortness of breath be accepted by Dr. Aye Cheung, [address] [place] [address] ED, for admission and further evaluation and management at [facility]:  None    FINAL IMPRESSION      1. 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 Acute leukemia not having achieved remission [facility])      The patient will be discharged in stable condition and will travel by private vehicle to [address] [place] [address] ED for admission and further evaluation and management.     PATIENT REFERRED TO:  [address] [place] [address]  [facility]    Go today  For re-check at ER - accepting physician is Dr. Cris Servin:  Discharge Medication List as of 6/18/2019  9:29 PM          (Please note that portions of this note werecompleted with a voice recognition program.  Efforts were made to edit the dictations but occasionally words are mis-transcribed.)    AMBROSE Edward PA-C  06/20/19 0788

## 2019-06-24 ENCOUNTER — HOSPITAL ENCOUNTER (OUTPATIENT)
Dept: PHYSICAL THERAPY | Age: 54
Setting detail: THERAPIES SERIES
Discharge: HOME | End: 2019-06-24
Payer: COMMERCIAL

## 2019-06-24 LAB
BLOOD CULTURE, ROUTINE: NORMAL
BLOOD CULTURE, ROUTINE: NORMAL

## 2019-06-24 NOTE — DISCHARGE SUMMARY
[facility]hem[facility]  [address] [place] [address]    Patient Name: Magda Cotton        CSN: [id number]   YOB: 1965  Gender: male          Patient is discharged from Physical Therapy services at this time. See last note for details related to results of therapy and goal achievement. Reason for discharge: PT called and spoke with patient who stated he was just diagnosed with leukemia and transferred to [address] [place] [address] for chemo. Will discharge from OP PT at this time, patient agreeable. PT wished patient good luck with treatment. Levi Dalal.  Deloris Forman # [id number]

## 2019-06-26 ENCOUNTER — APPOINTMENT (OUTPATIENT)
Dept: PHYSICAL THERAPY | Age: 54
End: 2019-06-26
Payer: COMMERCIAL

## 2019-07-18 ENCOUNTER — HOSPITAL ENCOUNTER (OUTPATIENT)
Dept: INFUSION THERAPY | Age: 54
Discharge: HOME | End: 2019-07-18
Payer: COMMERCIAL

## 2019-07-18 ENCOUNTER — OFFICE VISIT (OUTPATIENT)
Dept: ONCOLOGY | Age: 54
End: 2019-07-18
Payer: COMMERCIAL

## 2019-07-18 VITALS
OXYGEN SATURATION: 99 % | BODY MASS INDEX: 26.71 KG/M2 | TEMPERATURE: 98.3 F | HEART RATE: 75 BPM | DIASTOLIC BLOOD PRESSURE: 81 MMHG | WEIGHT: 190.8 LBS | SYSTOLIC BLOOD PRESSURE: 118 MMHG | HEIGHT: 71 IN | RESPIRATION RATE: 16 BRPM

## 2019-07-18 DIAGNOSIS — C95.01 ACUTE LEUKEMIA IN REMISSION (HCC): Primary | ICD-10-CM

## 2019-07-18 DIAGNOSIS — C95.01 ACUTE LEUKEMIA IN REMISSION (HCC): ICD-10-CM

## 2019-07-18 LAB
BASOPHILS # BLD: 0.3 %
BASOPHILS ABSOLUTE: 0 THOU/MM3 (ref 0–0.1)
BUN, WHOLE BLOOD: 16 MG/DL (ref 8–26)
CHLORIDE, WHOLE BLOOD: 98 MEQ/L (ref 98–109)
CREATININE, WHOLE BLOOD: 0.8 MG/DL (ref 0.5–1.2)
EOSINOPHIL # BLD: 0 %
EOSINOPHILS ABSOLUTE: 0 THOU/MM3 (ref 0–0.4)
GFR, ESTIMATED: > 90 ML/MIN/1.73M2
GLUCOSE, WHOLE BLOOD: 115 MG/DL (ref 70–108)
HCT VFR BLD CALC: 28.4 % (ref 42–52)
HEMOGLOBIN: 10 GM/DL (ref 14–18)
IMMATURE GRANS (ABS): 0.12 THOU/MM3 (ref 0–0.07)
IMMATURE GRANULOCYTES: 3.4 %
IONIZED CALCIUM, WHOLE BLOOD: 1.19 MMOL/L (ref 1.12–1.32)
LYMPHOCYTES # BLD: 21.6 %
LYMPHOCYTES ABSOLUTE: 0.7 THOU/MM3 (ref 1–4.8)
MCH RBC QN AUTO: 30.7 PG (ref 27–31)
MCHC RBC AUTO-ENTMCNC: 35.2 GM/DL (ref 33–37)
MCV RBC AUTO: 87 FL (ref 80–94)
MONOCYTES # BLD: 13.5 %
MONOCYTES ABSOLUTE: 0.4 THOU/MM3 (ref 0.4–1.3)
NUCLEATED RED BLOOD CELLS: 0 /100 WBC
PDW BLD-RTO: 12.5 % (ref 11.5–14.5)
PLATELET # BLD: 760 THOU/MM3 (ref 130–400)
PMV BLD AUTO: 5.5 FL (ref 7.4–10.4)
POTASSIUM, WHOLE BLOOD: 4.2 MEQ/L (ref 3.5–4.9)
RBC # BLD: 3.25 MILL/MM3 (ref 4.7–6.1)
SEG NEUTROPHILS: 61.2 %
SEGMENTED NEUTROPHILS ABSOLUTE COUNT: 2 THOU/MM3 (ref 1.8–7.7)
SODIUM, WHOLE BLOOD: 135 MEQ/L (ref 138–146)
TOTAL CO2, WHOLE BLOOD: 26 MEQ/L (ref 23–33)
WBC # BLD: 3.3 THOU/MM3 (ref 4.8–10.8)

## 2019-07-18 PROCEDURE — 36415 COLL VENOUS BLD VENIPUNCTURE: CPT

## 2019-07-18 PROCEDURE — 99203 OFFICE O/P NEW LOW 30 MIN: CPT | Performed by: NURSE PRACTITIONER

## 2019-07-18 PROCEDURE — G8417 CALC BMI ABV UP PARAM F/U: HCPCS | Performed by: NURSE PRACTITIONER

## 2019-07-18 PROCEDURE — 85025 COMPLETE CBC W/AUTO DIFF WBC: CPT

## 2019-07-18 PROCEDURE — 1036F TOBACCO NON-USER: CPT | Performed by: NURSE PRACTITIONER

## 2019-07-18 PROCEDURE — 99211 OFF/OP EST MAY X REQ PHY/QHP: CPT

## 2019-07-18 PROCEDURE — 80047 BASIC METABLC PNL IONIZED CA: CPT

## 2019-07-18 PROCEDURE — 3017F COLORECTAL CA SCREEN DOC REV: CPT | Performed by: NURSE PRACTITIONER

## 2019-07-18 PROCEDURE — G8427 DOCREV CUR MEDS BY ELIG CLIN: HCPCS | Performed by: NURSE PRACTITIONER

## 2019-07-18 RX ORDER — PROCHLORPERAZINE MALEATE 5 MG/1
5 TABLET ORAL EVERY 8 HOURS PRN
COMMUNITY
Start: 2019-07-15 | End: 2020-11-05 | Stop reason: ALTCHOICE

## 2019-07-18 RX ORDER — TRAMADOL HYDROCHLORIDE 50 MG/1
50 TABLET ORAL EVERY 4 HOURS PRN
COMMUNITY
Start: 2019-07-15 | End: 2019-07-22

## 2019-07-18 ASSESSMENT — ENCOUNTER SYMPTOMS
ABDOMINAL PAIN: 0
BACK PAIN: 1
VOMITING: 0
DIARRHEA: 0
CONSTIPATION: 0
BLOOD IN STOOL: 0
NAUSEA: 1
EYES NEGATIVE: 1
ABDOMINAL DISTENTION: 0
RESPIRATORY NEGATIVE: 1

## 2019-07-19 PROBLEM — C95.01 ACUTE LEUKEMIA IN REMISSION (HCC): Status: ACTIVE | Noted: 2019-07-19

## 2019-07-19 RX ORDER — 0.9 % SODIUM CHLORIDE 0.9 %
250 INTRAVENOUS SOLUTION INTRAVENOUS ONCE
Status: CANCELLED
Start: 2019-07-19

## 2019-07-26 ENCOUNTER — HOSPITAL ENCOUNTER (OUTPATIENT)
Dept: INFUSION THERAPY | Age: 54
Discharge: HOME | End: 2019-07-26
Payer: COMMERCIAL

## 2019-07-26 VITALS
DIASTOLIC BLOOD PRESSURE: 88 MMHG | OXYGEN SATURATION: 98 % | BODY MASS INDEX: 26.61 KG/M2 | HEIGHT: 71 IN | SYSTOLIC BLOOD PRESSURE: 137 MMHG | HEART RATE: 79 BPM | TEMPERATURE: 98.3 F | RESPIRATION RATE: 16 BRPM

## 2019-07-26 DIAGNOSIS — C95.01 ACUTE LEUKEMIA IN REMISSION (HCC): Primary | ICD-10-CM

## 2019-07-26 LAB
BASOPHILS # BLD: 1.7 %
BASOPHILS ABSOLUTE: 0.1 THOU/MM3 (ref 0–0.1)
BUN, WHOLE BLOOD: 18 MG/DL (ref 8–26)
CHLORIDE, WHOLE BLOOD: 100 MEQ/L (ref 98–109)
CREATININE, WHOLE BLOOD: 0.8 MG/DL (ref 0.5–1.2)
EOSINOPHIL # BLD: 0 %
EOSINOPHILS ABSOLUTE: 0 THOU/MM3 (ref 0–0.4)
GFR, ESTIMATED: > 90 ML/MIN/1.73M2
GLUCOSE, WHOLE BLOOD: 106 MG/DL (ref 70–108)
HCT VFR BLD CALC: 33.5 % (ref 42–52)
HEMOGLOBIN: 11.3 GM/DL (ref 14–18)
IMMATURE GRANS (ABS): 0.15 THOU/MM3 (ref 0–0.07)
IMMATURE GRANULOCYTES: 2.9 %
IONIZED CALCIUM, WHOLE BLOOD: 1.25 MMOL/L (ref 1.12–1.32)
LYMPHOCYTES # BLD: 15.3 %
LYMPHOCYTES ABSOLUTE: 0.8 THOU/MM3 (ref 1–4.8)
MAGNESIUM: 1.8 MG/DL (ref 1.6–2.4)
MCH RBC QN AUTO: 30 PG (ref 27–31)
MCHC RBC AUTO-ENTMCNC: 33.8 GM/DL (ref 33–37)
MCV RBC AUTO: 89 FL (ref 80–94)
MONOCYTES # BLD: 14.9 %
MONOCYTES ABSOLUTE: 0.8 THOU/MM3 (ref 0.4–1.3)
NUCLEATED RED BLOOD CELLS: 0 /100 WBC
PDW BLD-RTO: 13.1 % (ref 11.5–14.5)
PHOSPHORUS: 4.2 MG/DL (ref 2.4–4.7)
PLATELET # BLD: 603 THOU/MM3 (ref 130–400)
PMV BLD AUTO: 5.9 FL (ref 7.4–10.4)
POTASSIUM, WHOLE BLOOD: 4.3 MEQ/L (ref 3.5–4.9)
RBC # BLD: 3.77 MILL/MM3 (ref 4.7–6.1)
SEG NEUTROPHILS: 65.2 %
SEGMENTED NEUTROPHILS ABSOLUTE COUNT: 3.4 THOU/MM3 (ref 1.8–7.7)
SODIUM, WHOLE BLOOD: 137 MEQ/L (ref 138–146)
TOTAL CO2, WHOLE BLOOD: 26 MEQ/L (ref 23–33)
WBC # BLD: 5.2 THOU/MM3 (ref 4.8–10.8)

## 2019-07-26 PROCEDURE — 99212 OFFICE O/P EST SF 10 MIN: CPT

## 2019-07-26 PROCEDURE — 80047 BASIC METABLC PNL IONIZED CA: CPT

## 2019-07-26 PROCEDURE — 36415 COLL VENOUS BLD VENIPUNCTURE: CPT

## 2019-07-26 PROCEDURE — 2580000003 HC RX 258: Performed by: INTERNAL MEDICINE

## 2019-07-26 PROCEDURE — 2709999900 HC NON-CHARGEABLE SUPPLY

## 2019-07-26 PROCEDURE — 84100 ASSAY OF PHOSPHORUS: CPT

## 2019-07-26 PROCEDURE — 85025 COMPLETE CBC W/AUTO DIFF WBC: CPT

## 2019-07-26 PROCEDURE — 83735 ASSAY OF MAGNESIUM: CPT

## 2019-07-26 PROCEDURE — 36592 COLLECT BLOOD FROM PICC: CPT

## 2019-07-26 RX ORDER — FLUTICASONE PROPIONATE 50 MCG
SPRAY, SUSPENSION (ML) NASAL DAILY
COMMUNITY
End: 2020-11-05

## 2019-07-26 RX ORDER — HEPARIN SODIUM (PORCINE) LOCK FLUSH IV SOLN 100 UNIT/ML 100 UNIT/ML
500 SOLUTION INTRAVENOUS PRN
Status: DISCONTINUED | OUTPATIENT
Start: 2019-07-26 | End: 2019-07-27 | Stop reason: HOSPADM

## 2019-07-26 RX ORDER — SODIUM CHLORIDE 0.9 % (FLUSH) 0.9 %
10 SYRINGE (ML) INJECTION PRN
Status: CANCELLED | OUTPATIENT
Start: 2019-07-26

## 2019-07-26 RX ORDER — SODIUM CHLORIDE 0.9 % (FLUSH) 0.9 %
20 SYRINGE (ML) INJECTION PRN
Status: DISCONTINUED | OUTPATIENT
Start: 2019-07-26 | End: 2019-07-27 | Stop reason: HOSPADM

## 2019-07-26 RX ORDER — SODIUM CHLORIDE 0.9 % (FLUSH) 0.9 %
20 SYRINGE (ML) INJECTION PRN
Status: CANCELLED | OUTPATIENT
Start: 2019-07-26

## 2019-07-26 RX ORDER — HEPARIN SODIUM (PORCINE) LOCK FLUSH IV SOLN 100 UNIT/ML 100 UNIT/ML
500 SOLUTION INTRAVENOUS PRN
Status: CANCELLED | OUTPATIENT
Start: 2019-07-26

## 2019-07-26 RX ORDER — 0.9 % SODIUM CHLORIDE 0.9 %
250 INTRAVENOUS SOLUTION INTRAVENOUS ONCE
Status: CANCELLED
Start: 2019-07-26

## 2019-07-26 RX ORDER — SODIUM CHLORIDE 0.9 % (FLUSH) 0.9 %
10 SYRINGE (ML) INJECTION PRN
Status: DISCONTINUED | OUTPATIENT
Start: 2019-07-26 | End: 2019-07-27 | Stop reason: HOSPADM

## 2019-07-26 RX ADMIN — Medication 10 ML: at 09:43

## 2019-07-26 RX ADMIN — Medication 20 ML: at 09:19

## 2019-07-26 RX ADMIN — Medication 10 ML: at 09:18

## 2019-07-26 RX ADMIN — Medication 10 ML: at 09:44

## 2019-07-26 NOTE — PROGRESS NOTES
Patient assessed for the following post lab draw:    Dizziness   No  Lightheadedness  No     Acute nausea/vomiting No  Headache   No  Chest pain/pressure  No  Rash/itching   No  Shortness of breath  No        Patient tolerated lab draw per red port of picc line without any complications. Patient tolerated picc dressing change without any complications. Last vital signs:   /88   Pulse 79   Temp 98.3 °F (36.8 °C) (Oral)   Resp 16   Ht 5' 11\" (1.803 m)   SpO2 98%   BMI 26.61 kg/m²     Patient instructed if experience any of the above symptoms following today's lab draw and dressing change,he/she is to notify MD immediately or go to the emergency department. Discharge instructions given to patient. Verbalizes understanding. Ambulated off unit per self with spouse with belongings.

## 2019-07-26 NOTE — PLAN OF CARE
Problem: Discharge Planning  Goal: Knowledge of discharge instructions  Description  Knowledge of discharge instructions     Outcome: Met This Shift  Note:   Verbalize understanding of discharge instructions, follow up appointments, and when to call Physician. Intervention: Interaction with patient/family and care team  Note:   Provide discharge instructions. Problem: Infection - Central Venous Catheter-Associated Bloodstream Infection:  Goal: Will show no infection signs and symptoms  Description  Will show no infection signs and symptoms  Outcome: Met This Shift  Note:   PICC site with no redness, warmth, or drainage. Patient verbalizes signs/symptoms of port infection and when to notify the physician. Intervention: Central line needs assessment  Note:   Discussed picc , infection prevention, and when to call the physician. Labs drawn per red port. Dressing changed. Care plan reviewed with patient and spouse. Patient and spouse verbalize understanding of the plan of care and contribute to goal setting.

## 2019-07-30 ENCOUNTER — TELEPHONE (OUTPATIENT)
Dept: ONCOLOGY | Age: 54
End: 2019-07-30

## 2019-08-12 DIAGNOSIS — C95.01 ACUTE LEUKEMIA IN REMISSION (HCC): Primary | ICD-10-CM

## 2019-08-13 ENCOUNTER — HOSPITAL ENCOUNTER (OUTPATIENT)
Dept: INFUSION THERAPY | Age: 54
Discharge: HOME | End: 2019-08-13
Payer: COMMERCIAL

## 2019-08-13 VITALS
HEIGHT: 71 IN | TEMPERATURE: 99.3 F | RESPIRATION RATE: 16 BRPM | SYSTOLIC BLOOD PRESSURE: 119 MMHG | HEART RATE: 88 BPM | WEIGHT: 206.6 LBS | BODY MASS INDEX: 28.92 KG/M2 | DIASTOLIC BLOOD PRESSURE: 79 MMHG | OXYGEN SATURATION: 99 %

## 2019-08-13 DIAGNOSIS — C95.01 ACUTE LEUKEMIA IN REMISSION (HCC): Primary | ICD-10-CM

## 2019-08-13 LAB
ALBUMIN SERPL-MCNC: 4 G/DL (ref 3.5–5.1)
ALP BLD-CCNC: 56 U/L (ref 38–126)
ALT SERPL-CCNC: 17 U/L (ref 11–66)
AST SERPL-CCNC: 14 U/L (ref 5–40)
BASINOPHIL, AUTOMATED: 0 % (ref 0–3)
BILIRUB SERPL-MCNC: 0.9 MG/DL (ref 0.3–1.2)
BILIRUBIN DIRECT: < 0.2 MG/DL (ref 0–0.3)
BUN, WHOLE BLOOD: 19 MG/DL (ref 8–26)
CHLORIDE, WHOLE BLOOD: 97 MEQ/L (ref 98–109)
CREATININE, WHOLE BLOOD: 0.9 MG/DL (ref 0.5–1.2)
EOSINOPHILS RELATIVE PERCENT: 1 % (ref 0–4)
GFR, ESTIMATED: > 90 ML/MIN/1.73M2
GLUCOSE, WHOLE BLOOD: 99 MG/DL (ref 70–108)
HCT VFR BLD CALC: 24.6 % (ref 42–52)
HEMOGLOBIN: 8.4 GM/DL (ref 14–18)
IONIZED CALCIUM, WHOLE BLOOD: 1.16 MMOL/L (ref 1.12–1.32)
LYMPHOCYTES # BLD: 9 % (ref 15–47)
MCH RBC QN AUTO: 30 PG (ref 27–31)
MCHC RBC AUTO-ENTMCNC: 34.3 GM/DL (ref 33–37)
MCV RBC AUTO: 88 FL (ref 80–94)
MONOCYTES: 8 % (ref 0–12)
PDW BLD-RTO: 12.9 % (ref 11.5–14.5)
PLATELET # BLD: 14 THOU/MM3 (ref 130–400)
PMV BLD AUTO: 6.9 FL (ref 7.4–10.4)
POTASSIUM, WHOLE BLOOD: 4.3 MEQ/L (ref 3.5–4.9)
RBC # BLD: 2.81 MILL/MM3 (ref 4.7–6.1)
SEG NEUTROPHILS: 82 % (ref 43–75)
SODIUM, WHOLE BLOOD: 133 MEQ/L (ref 138–146)
TOTAL CO2, WHOLE BLOOD: 24 MEQ/L (ref 23–33)
TOTAL PROTEIN: 6.2 G/DL (ref 6.1–8)
WBC # BLD: 5.3 THOU/MM3 (ref 4.8–10.8)

## 2019-08-13 PROCEDURE — 85025 COMPLETE CBC W/AUTO DIFF WBC: CPT

## 2019-08-13 PROCEDURE — 80076 HEPATIC FUNCTION PANEL: CPT

## 2019-08-13 PROCEDURE — 2580000003 HC RX 258: Performed by: INTERNAL MEDICINE

## 2019-08-13 PROCEDURE — 99211 OFF/OP EST MAY X REQ PHY/QHP: CPT

## 2019-08-13 PROCEDURE — 36592 COLLECT BLOOD FROM PICC: CPT

## 2019-08-13 PROCEDURE — 80047 BASIC METABLC PNL IONIZED CA: CPT

## 2019-08-13 PROCEDURE — 36415 COLL VENOUS BLD VENIPUNCTURE: CPT

## 2019-08-13 RX ORDER — SODIUM CHLORIDE 0.9 % (FLUSH) 0.9 %
10 SYRINGE (ML) INJECTION PRN
Status: CANCELLED | OUTPATIENT
Start: 2019-08-13

## 2019-08-13 RX ORDER — SODIUM CHLORIDE 0.9 % (FLUSH) 0.9 %
20 SYRINGE (ML) INJECTION PRN
Status: CANCELLED | OUTPATIENT
Start: 2019-08-13

## 2019-08-13 RX ORDER — 0.9 % SODIUM CHLORIDE 0.9 %
250 INTRAVENOUS SOLUTION INTRAVENOUS ONCE
Status: CANCELLED
Start: 2019-08-13

## 2019-08-13 RX ORDER — SODIUM CHLORIDE 0.9 % (FLUSH) 0.9 %
10 SYRINGE (ML) INJECTION PRN
Status: DISCONTINUED | OUTPATIENT
Start: 2019-08-13 | End: 2019-08-14 | Stop reason: HOSPADM

## 2019-08-13 RX ORDER — SODIUM CHLORIDE 0.9 % (FLUSH) 0.9 %
20 SYRINGE (ML) INJECTION PRN
Status: DISCONTINUED | OUTPATIENT
Start: 2019-08-13 | End: 2019-08-14 | Stop reason: HOSPADM

## 2019-08-13 RX ORDER — HEPARIN SODIUM (PORCINE) LOCK FLUSH IV SOLN 100 UNIT/ML 100 UNIT/ML
500 SOLUTION INTRAVENOUS PRN
Status: CANCELLED | OUTPATIENT
Start: 2019-08-13

## 2019-08-13 RX ADMIN — Medication 10 ML: at 09:05

## 2019-08-13 RX ADMIN — Medication 20 ML: at 09:08

## 2019-08-13 RX ADMIN — Medication 20 ML: at 09:06

## 2019-08-13 NOTE — PROGRESS NOTES
Patient tolerated lab draw via right arm PICC line- red port without any complications. Discharge instructions given to patient-verbalizes understanding. Ambulated off unit per self with belongings.

## 2019-08-13 NOTE — PLAN OF CARE
Problem: Discharge Planning  Goal: Knowledge of discharge instructions  Description  Knowledge of discharge instructions     Outcome: Met This Shift  Note:   Verbalize understanding of discharge instructions, follow up appointments, and when to call Physician. Intervention: Interaction with patient/family and care team  Note:   Discuss understanding of discharge instructions, follow up appointments and when to call Physician. Problem: Infection - Central Venous Catheter-Associated Bloodstream Infection:  Goal: Will show no infection signs and symptoms  Description  Will show no infection signs and symptoms  Outcome: Met This Shift  Note:   Instructed to monitor for signs/symptoms of infection at right arm PICC line site and call MD if problems develop. Intervention: Central line needs assessment  Note:   PICC site with no redness,warmth or drainage. Patient verbalizes signs/symptoms of port infection and when to notify the physician. Care plan reviewed with patient. Patient verbalize understanding of the plan of care and contribute to goal setting.

## 2019-08-14 ENCOUNTER — HOSPITAL ENCOUNTER (OUTPATIENT)
Dept: INFUSION THERAPY | Age: 54
Discharge: HOME | End: 2019-08-14
Payer: COMMERCIAL

## 2019-08-14 VITALS
RESPIRATION RATE: 16 BRPM | OXYGEN SATURATION: 100 % | TEMPERATURE: 98 F | HEART RATE: 71 BPM | DIASTOLIC BLOOD PRESSURE: 83 MMHG | SYSTOLIC BLOOD PRESSURE: 148 MMHG

## 2019-08-14 DIAGNOSIS — C95.01 ACUTE LEUKEMIA IN REMISSION (HCC): Primary | ICD-10-CM

## 2019-08-14 LAB — SCAN OF BLOOD SMEAR: NORMAL

## 2019-08-14 PROCEDURE — 36592 COLLECT BLOOD FROM PICC: CPT

## 2019-08-14 PROCEDURE — 2580000003 HC RX 258: Performed by: NURSE PRACTITIONER

## 2019-08-14 PROCEDURE — 99211 OFF/OP EST MAY X REQ PHY/QHP: CPT

## 2019-08-14 PROCEDURE — P9037 PLATE PHERES LEUKOREDU IRRAD: HCPCS

## 2019-08-14 PROCEDURE — 36430 TRANSFUSION BLD/BLD COMPNT: CPT

## 2019-08-14 PROCEDURE — 85025 COMPLETE CBC W/AUTO DIFF WBC: CPT

## 2019-08-14 PROCEDURE — 36415 COLL VENOUS BLD VENIPUNCTURE: CPT

## 2019-08-14 PROCEDURE — 2580000003 HC RX 258: Performed by: INTERNAL MEDICINE

## 2019-08-14 RX ORDER — FUROSEMIDE 10 MG/ML
20 INJECTION INTRAMUSCULAR; INTRAVENOUS ONCE
Status: CANCELLED | OUTPATIENT
Start: 2019-08-14

## 2019-08-14 RX ORDER — 0.9 % SODIUM CHLORIDE 0.9 %
10 VIAL (ML) INJECTION ONCE
Status: CANCELLED | OUTPATIENT
Start: 2019-08-14

## 2019-08-14 RX ORDER — SODIUM CHLORIDE 9 MG/ML
INJECTION, SOLUTION INTRAVENOUS CONTINUOUS
Status: CANCELLED | OUTPATIENT
Start: 2019-08-14

## 2019-08-14 RX ORDER — DIPHENHYDRAMINE HYDROCHLORIDE 50 MG/ML
50 INJECTION INTRAMUSCULAR; INTRAVENOUS ONCE
Status: CANCELLED | OUTPATIENT
Start: 2019-08-14

## 2019-08-14 RX ORDER — 0.9 % SODIUM CHLORIDE 0.9 %
250 INTRAVENOUS SOLUTION INTRAVENOUS ONCE
Status: CANCELLED
Start: 2019-08-14

## 2019-08-14 RX ORDER — SODIUM CHLORIDE 0.9 % (FLUSH) 0.9 %
20 SYRINGE (ML) INJECTION PRN
Status: DISCONTINUED | OUTPATIENT
Start: 2019-08-14 | End: 2019-08-15 | Stop reason: HOSPADM

## 2019-08-14 RX ORDER — SODIUM CHLORIDE 9 MG/ML
INJECTION, SOLUTION INTRAVENOUS CONTINUOUS
Status: DISCONTINUED | OUTPATIENT
Start: 2019-08-14 | End: 2019-08-15 | Stop reason: HOSPADM

## 2019-08-14 RX ORDER — SODIUM CHLORIDE 0.9 % (FLUSH) 0.9 %
20 SYRINGE (ML) INJECTION PRN
Status: CANCELLED | OUTPATIENT
Start: 2019-08-14

## 2019-08-14 RX ORDER — SODIUM CHLORIDE 0.9 % (FLUSH) 0.9 %
10 SYRINGE (ML) INJECTION PRN
Status: CANCELLED | OUTPATIENT
Start: 2019-08-14

## 2019-08-14 RX ORDER — METHYLPREDNISOLONE SODIUM SUCCINATE 125 MG/2ML
125 INJECTION, POWDER, LYOPHILIZED, FOR SOLUTION INTRAMUSCULAR; INTRAVENOUS ONCE
Status: CANCELLED | OUTPATIENT
Start: 2019-08-14

## 2019-08-14 RX ORDER — SODIUM CHLORIDE 0.9 % (FLUSH) 0.9 %
10 SYRINGE (ML) INJECTION PRN
Status: DISCONTINUED | OUTPATIENT
Start: 2019-08-14 | End: 2019-08-15 | Stop reason: HOSPADM

## 2019-08-14 RX ORDER — 0.9 % SODIUM CHLORIDE 0.9 %
250 INTRAVENOUS SOLUTION INTRAVENOUS ONCE
Status: DISCONTINUED | OUTPATIENT
Start: 2019-08-14 | End: 2019-08-14 | Stop reason: HOSPADM

## 2019-08-14 RX ORDER — HEPARIN SODIUM (PORCINE) LOCK FLUSH IV SOLN 100 UNIT/ML 100 UNIT/ML
500 SOLUTION INTRAVENOUS PRN
Status: CANCELLED | OUTPATIENT
Start: 2019-08-14

## 2019-08-14 RX ADMIN — SODIUM CHLORIDE: 9 INJECTION, SOLUTION INTRAVENOUS at 12:13

## 2019-08-14 RX ADMIN — Medication 10 ML: at 12:13

## 2019-08-14 RX ADMIN — Medication 10 ML: at 11:26

## 2019-08-14 RX ADMIN — Medication 20 ML: at 17:00

## 2019-08-14 RX ADMIN — Medication 20 ML: at 11:27

## 2019-08-14 RX ADMIN — SODIUM CHLORIDE 250 ML: 9 INJECTION, SOLUTION INTRAVENOUS at 15:40

## 2019-08-14 NOTE — PROGRESS NOTES
Platelet count was 2 today. Patient states that he has some blood on tissue after blowing his nose and had some gingival bleeding after brushing his teeth but denies christian blood, hematuria, melena or new bruising. Will transfuse 1 unit of platelets and recheck platelet count. If < 20, 000 will give another unit of platelets.       Marcin Marcus, APRN-CNP

## 2019-08-14 NOTE — PLAN OF CARE
Problem: Discharge Planning  Goal: Knowledge of discharge instructions  Description  Knowledge of discharge instructions     Outcome: Met This Shift  Note:   Verbalize understanding of discharge instructions, follow up appointments, and when to call Physician. Intervention: Interaction with patient/family and care team  Note:   Discuss understanding of discharge instructions, follow up appointments and when to call Physician. Problem: Intellectual/Education/Knowledge Deficit  Goal: Teaching initiated upon admission  Outcome: Met This Shift  Note:   Patient verbalizes understanding to verbal information given on 2 units Platelets,action and possible side effects. Aware to call MD if develop complications. Intervention: Verbal/written education provided  Note:   Patient educated blood product transfusion protocol:    Patient receiving 2 units Platelets:      - Blood product transfusion information sheet given: questions answered and consent signed  - Take vital signs/ monitor lungs sound prior to transfusion  - Monitor patient for 15 minutes after transfusion started  - Take vital signs / monitor lungs sound in 15 minutes and post transfusion  - Assess IV site   - Monitor patient closely for potential transfusion reaction    Call MD if develop complications once discharged. Problem: Infection - Central Venous Catheter-Associated Bloodstream Infection:  Goal: Will show no infection signs and symptoms  Description  Will show no infection signs and symptoms  Outcome: Met This Shift  Note:   Instructed to monitor for signs/symptoms of infection at Right arm PICC line site and call MD if problems develop. Intervention: Central line needs assessment  Note:   PICC site with no redness,warmth or drainage. Patient verbalizes signs/symptoms of port infection and when to notify the physician. Care plan reviewed with patient.   Patient verbalize understanding of the plan of care and contribute to goal setting.

## 2019-08-14 NOTE — PROGRESS NOTES
Patient tolerated transfusion of 2 units Platelets without any complications. Patient kept for 20 minutes observation post transfusion. Denies dizziness, lightheadedness, acute nausea or vomiting, headache, chest pain/pressure, rash/itching, or an increase in SOB. Last vital signs:   BP (!) 148/83   Pulse 71   Temp 98 °F (36.7 °C) (Oral)   Resp 16   SpO2 100%     Patient instructed if they experience any of the above symptoms following today's transfusion,he/she is to notify the physician immediately or go to the emergency department. Discharge instructions given to patient. Verbalizes understanding. Ambulated off unit per self in stable condition with all belongings.

## 2019-08-15 LAB
BASOPHILS # BLD: 0.6 %
BASOPHILS ABSOLUTE: 0 THOU/MM3 (ref 0–0.1)
DOHLE BODIES: PRESENT
EOSINOPHIL # BLD: 0 %
EOSINOPHILS ABSOLUTE: 0 THOU/MM3 (ref 0–0.4)
ERYTHROCYTE [DISTWIDTH] IN BLOOD BY AUTOMATED COUNT: 14 % (ref 11.5–14.5)
ERYTHROCYTE [DISTWIDTH] IN BLOOD BY AUTOMATED COUNT: 48 FL (ref 35–45)
HCT VFR BLD CALC: 27.7 % (ref 42–52)
HEMOGLOBIN: 8.6 GM/DL (ref 14–18)
IMMATURE GRANS (ABS): 0.02 THOU/MM3 (ref 0–0.07)
IMMATURE GRANULOCYTES: 1 %
LYMPHOCYTES # BLD: 18.6 %
LYMPHOCYTES ABSOLUTE: 0.6 THOU/MM3 (ref 1–4.8)
MCH RBC QN AUTO: 29.4 PG (ref 26–33)
MCHC RBC AUTO-ENTMCNC: 31 GM/DL (ref 32.2–35.5)
MCV RBC AUTO: 94.5 FL (ref 80–94)
MONOCYTES # BLD: 2.6 %
MONOCYTES ABSOLUTE: 0.1 THOU/MM3 (ref 0.4–1.3)
NUCLEATED RED BLOOD CELLS: 0 /100 WBC
PATHOLOGIST REVIEW: ABNORMAL
PLATELET # BLD: 5 THOU/MM3 (ref 130–400)
PMV BLD AUTO: 11.3 FL (ref 9.4–12.4)
RBC # BLD: 2.93 MILL/MM3 (ref 4.7–6.1)
SEG NEUTROPHILS: 77.6 %
SEGMENTED NEUTROPHILS ABSOLUTE COUNT: 2.4 THOU/MM3 (ref 1.8–7.7)
WBC # BLD: 3.1 THOU/MM3 (ref 4.8–10.8)

## 2019-08-16 ENCOUNTER — HOSPITAL ENCOUNTER (OUTPATIENT)
Dept: INFUSION THERAPY | Age: 54
Discharge: HOME | End: 2019-08-16
Payer: COMMERCIAL

## 2019-08-16 VITALS
RESPIRATION RATE: 16 BRPM | HEART RATE: 80 BPM | DIASTOLIC BLOOD PRESSURE: 70 MMHG | OXYGEN SATURATION: 97 % | SYSTOLIC BLOOD PRESSURE: 117 MMHG | TEMPERATURE: 98.7 F

## 2019-08-16 DIAGNOSIS — C95.01 ACUTE LEUKEMIA IN REMISSION (HCC): Primary | ICD-10-CM

## 2019-08-16 LAB
ABO: NORMAL
ANTIBODY SCREEN: NORMAL
BASINOPHIL, AUTOMATED: 0 % (ref 0–3)
EOSINOPHILS RELATIVE PERCENT: 2 % (ref 0–4)
HCT VFR BLD CALC: 19.7 % (ref 42–52)
HCT VFR BLD CALC: 23 % (ref 42–52)
HEMOGLOBIN: 6.9 GM/DL (ref 14–18)
HEMOGLOBIN: 8.1 GM/DL (ref 14–18)
LYMPHOCYTES # BLD: 87 % (ref 15–47)
MCH RBC QN AUTO: 29.8 PG (ref 27–31)
MCH RBC QN AUTO: 30.1 PG (ref 27–31)
MCHC RBC AUTO-ENTMCNC: 34.9 GM/DL (ref 33–37)
MCHC RBC AUTO-ENTMCNC: 35 GM/DL (ref 33–37)
MCV RBC AUTO: 86 FL (ref 80–94)
MCV RBC AUTO: 86 FL (ref 80–94)
MONOCYTES: 6 % (ref 0–12)
PATHOLOGIST REVIEW: ABNORMAL
PDW BLD-RTO: 12.2 % (ref 11.5–14.5)
PDW BLD-RTO: 12.6 % (ref 11.5–14.5)
PLATELET # BLD: 36 THOU/MM3 (ref 130–400)
PLATELET # BLD: 7 THOU/MM3 (ref 130–400)
PMV BLD AUTO: 6.4 FL (ref 7.4–10.4)
PMV BLD AUTO: 8.5 FL (ref 7.4–10.4)
RBC # BLD: 2.31 MILL/MM3 (ref 4.7–6.1)
RBC # BLD: 2.68 MILL/MM3 (ref 4.7–6.1)
RH FACTOR: NORMAL
SEG NEUTROPHILS: 5 % (ref 43–75)
WBC # BLD: 0.5 THOU/MM3 (ref 4.8–10.8)
WBC # BLD: 0.5 THOU/MM3 (ref 4.8–10.8)

## 2019-08-16 PROCEDURE — 86850 RBC ANTIBODY SCREEN: CPT

## 2019-08-16 PROCEDURE — P9037 PLATE PHERES LEUKOREDU IRRAD: HCPCS

## 2019-08-16 PROCEDURE — 2580000003 HC RX 258: Performed by: NURSE PRACTITIONER

## 2019-08-16 PROCEDURE — 85025 COMPLETE CBC W/AUTO DIFF WBC: CPT

## 2019-08-16 PROCEDURE — 86901 BLOOD TYPING SEROLOGIC RH(D): CPT

## 2019-08-16 PROCEDURE — 36415 COLL VENOUS BLD VENIPUNCTURE: CPT

## 2019-08-16 PROCEDURE — 86923 COMPATIBILITY TEST ELECTRIC: CPT

## 2019-08-16 PROCEDURE — 85027 COMPLETE CBC AUTOMATED: CPT

## 2019-08-16 PROCEDURE — 2580000003 HC RX 258: Performed by: INTERNAL MEDICINE

## 2019-08-16 PROCEDURE — 36592 COLLECT BLOOD FROM PICC: CPT

## 2019-08-16 PROCEDURE — 99213 OFFICE O/P EST LOW 20 MIN: CPT

## 2019-08-16 PROCEDURE — 86900 BLOOD TYPING SEROLOGIC ABO: CPT

## 2019-08-16 PROCEDURE — 36430 TRANSFUSION BLD/BLD COMPNT: CPT

## 2019-08-16 PROCEDURE — P9040 RBC LEUKOREDUCED IRRADIATED: HCPCS

## 2019-08-16 RX ORDER — POSACONAZOLE 100 MG/1
100 TABLET, DELAYED RELEASE ORAL 3 TIMES DAILY
COMMUNITY
End: 2020-03-24

## 2019-08-16 RX ORDER — 0.9 % SODIUM CHLORIDE 0.9 %
250 INTRAVENOUS SOLUTION INTRAVENOUS ONCE
Status: CANCELLED
Start: 2019-08-16

## 2019-08-16 RX ORDER — 0.9 % SODIUM CHLORIDE 0.9 %
250 INTRAVENOUS SOLUTION INTRAVENOUS ONCE
Status: COMPLETED | OUTPATIENT
Start: 2019-08-16 | End: 2019-08-16

## 2019-08-16 RX ORDER — ACYCLOVIR 800 MG/1
800 TABLET ORAL 2 TIMES DAILY
COMMUNITY
End: 2020-03-24

## 2019-08-16 RX ORDER — SODIUM CHLORIDE 0.9 % (FLUSH) 0.9 %
20 SYRINGE (ML) INJECTION PRN
Status: DISCONTINUED | OUTPATIENT
Start: 2019-08-16 | End: 2019-08-17 | Stop reason: HOSPADM

## 2019-08-16 RX ORDER — SODIUM CHLORIDE 0.9 % (FLUSH) 0.9 %
20 SYRINGE (ML) INJECTION PRN
Status: CANCELLED | OUTPATIENT
Start: 2019-08-16

## 2019-08-16 RX ORDER — HEPARIN SODIUM (PORCINE) LOCK FLUSH IV SOLN 100 UNIT/ML 100 UNIT/ML
500 SOLUTION INTRAVENOUS PRN
Status: CANCELLED | OUTPATIENT
Start: 2019-08-16

## 2019-08-16 RX ORDER — SODIUM CHLORIDE 0.9 % (FLUSH) 0.9 %
10 SYRINGE (ML) INJECTION PRN
Status: DISCONTINUED | OUTPATIENT
Start: 2019-08-16 | End: 2019-08-17 | Stop reason: HOSPADM

## 2019-08-16 RX ORDER — SODIUM CHLORIDE 0.9 % (FLUSH) 0.9 %
10 SYRINGE (ML) INJECTION PRN
Status: CANCELLED | OUTPATIENT
Start: 2019-08-16

## 2019-08-16 RX ORDER — LEVOFLOXACIN 500 MG/1
500 TABLET, FILM COATED ORAL DAILY
COMMUNITY
End: 2020-03-24

## 2019-08-16 RX ORDER — FUROSEMIDE 10 MG/ML
20 INJECTION INTRAMUSCULAR; INTRAVENOUS ONCE
Status: CANCELLED | OUTPATIENT
Start: 2019-08-16

## 2019-08-16 RX ADMIN — Medication 20 ML: at 15:11

## 2019-08-16 RX ADMIN — SODIUM CHLORIDE 250 ML: 0.9 INJECTION, SOLUTION INTRAVENOUS at 16:24

## 2019-08-16 RX ADMIN — Medication 10 ML: at 08:47

## 2019-08-16 RX ADMIN — Medication 20 ML: at 14:45

## 2019-08-16 RX ADMIN — Medication 10 ML: at 12:21

## 2019-08-16 RX ADMIN — SODIUM CHLORIDE 250 ML: 9 INJECTION, SOLUTION INTRAVENOUS at 12:21

## 2019-08-16 RX ADMIN — Medication 10 ML: at 14:44

## 2019-08-16 RX ADMIN — Medication 20 ML: at 08:48

## 2019-08-16 RX ADMIN — Medication 10 ML: at 10:27

## 2019-08-16 RX ADMIN — SODIUM CHLORIDE 250 ML: 9 INJECTION, SOLUTION INTRAVENOUS at 10:27

## 2019-08-16 RX ADMIN — Medication 10 ML: at 16:24

## 2019-08-16 RX ADMIN — Medication 10 ML: at 19:03

## 2019-08-16 RX ADMIN — Medication 10 ML: at 15:10

## 2019-08-16 RX ADMIN — Medication 10 ML: at 08:48

## 2019-08-16 NOTE — PLAN OF CARE
Mr. Dalal's platelet count is 2387. He states that he has blood with small clots when he blows his nose and has had blood on tissue when he wipes but only after the first wipe. He denies headache, dizziness, new bruising or bleeding, hematuria, gingival bleeding. Will transfuse 2 units irradiated leukoreduced platelets today.

## 2019-08-16 NOTE — PROGRESS NOTES
Patient tolerated transfusion of 2 units platelets and 1 unit PRBCs without any complications. Patient kept for 20 minutes observation post transfusion. Denies dizziness, lightheadedness, acute nausea or vomiting, headache, chest pain/pressure, rash/itching, or an increase in SOB. Last vital signs:   /70   Pulse 80   Temp 98.7 °F (37.1 °C) (Oral)   Resp 16   SpO2 97%     Patient instructed if they experience any of the above symptoms following today's transfusion, he is to notify the physician immediately or go to the emergency department. Discharge instructions given to patient. Verbalizes understanding. Ambulated off unit per self.

## 2019-08-16 NOTE — PLAN OF CARE
Problem: Discharge Planning  Goal: Knowledge of discharge instructions  Description  Knowledge of discharge instructions     Outcome: Met This Shift  Note:   Patient verbalizes understanding of discharge instructions, follow up appointment, and when to call physician if needed   Intervention: Interaction with patient/family and care team  Note:   Discharge instructions given to pt and reviewed. Follow up appointments discussed. Problem: Intellectual/Education/Knowledge Deficit  Goal: Teaching initiated upon admission  Outcome: Met This Shift  Note:   Patient verbalize understanding to verbal and written information given on platelet and PRBC transfusion, procedure and possible reaction. Instructed to call MD if develop any complications once discharged. Intervention: Verbal/written education provided  Note:   Patient educated blood product transfusion protocol:    Patient receiving 2 units platelets and 1 unit PRBCs:      - Blood product transfusion information sheet given: questions answered and consent signed  - Take vital signs/ monitor lungs sound prior to transfusion  - Monitor patient for 15 minutes after transfusion started  - Take vital signs / monitor lungs sound in 15 minutes and post transfusion  - Assess IV site   - Monitor patient closely for potential transfusion reaction    Call MD if develop complications once discharged. Problem: Infection - Central Venous Catheter-Associated Bloodstream Infection:  Goal: Will show no infection signs and symptoms  Description  Will show no infection signs and symptoms  Outcome: Met This Shift  Note:   PICC site with no redness, warmth or drainage. Patient verbalizes signs/symptoms of port infection and when to notify the physician. Intervention: Infection risk assessment  Note:   Discuss PICC , infection prevention, signs of infection and when to call the doctor.        Problem: Falls - Risk of:  Goal: Will remain free from falls  Description  Will remain free from falls  Outcome: Met This Shift  Note:   Patient free of falls this visit. Intervention: Assess risk factors for falls  Note:   Fall risks assessed. Precautions discussed. Call light within reach. Care plan reviewed with patient. Patient verbalizes understanding of the plan of care and contribute to goal setting.

## 2019-08-19 ENCOUNTER — HOSPITAL ENCOUNTER (OUTPATIENT)
Dept: INFUSION THERAPY | Age: 54
Discharge: HOME | End: 2019-08-19
Payer: COMMERCIAL

## 2019-08-19 VITALS
TEMPERATURE: 98.2 F | HEIGHT: 71 IN | SYSTOLIC BLOOD PRESSURE: 118 MMHG | OXYGEN SATURATION: 98 % | WEIGHT: 204 LBS | RESPIRATION RATE: 16 BRPM | DIASTOLIC BLOOD PRESSURE: 66 MMHG | HEART RATE: 80 BPM | BODY MASS INDEX: 28.56 KG/M2

## 2019-08-19 DIAGNOSIS — C95.01 ACUTE LEUKEMIA IN REMISSION (HCC): Primary | ICD-10-CM

## 2019-08-19 LAB
ALBUMIN SERPL-MCNC: 4.1 G/DL (ref 3.5–5.1)
ALP BLD-CCNC: 84 U/L (ref 38–126)
ALT SERPL-CCNC: 34 U/L (ref 11–66)
AST SERPL-CCNC: 27 U/L (ref 5–40)
BASOPHILS # BLD: 0 %
BASOPHILS ABSOLUTE: 0 THOU/MM3 (ref 0–0.1)
BILIRUB SERPL-MCNC: 0.5 MG/DL (ref 0.3–1.2)
BILIRUBIN DIRECT: < 0.2 MG/DL (ref 0–0.3)
BLASTS: 8 %
BUN, WHOLE BLOOD: 18 MG/DL (ref 8–26)
CHLORIDE, WHOLE BLOOD: 98 MEQ/L (ref 98–109)
CREATININE, WHOLE BLOOD: 1 MG/DL (ref 0.5–1.2)
DIFFERENTIAL TYPE: ABNORMAL
DIFFERENTIAL, MANUAL: NORMAL
DOHLE BODIES: PRESENT
EOSINOPHIL # BLD: 1 %
EOSINOPHILS ABSOLUTE: 0.1 THOU/MM3 (ref 0–0.4)
GFR, ESTIMATED: 82 ML/MIN/1.73M2
GLUCOSE, WHOLE BLOOD: 106 MG/DL (ref 70–108)
HCT VFR BLD CALC: 28.2 % (ref 42–52)
HEMOGLOBIN: 9.8 GM/DL (ref 14–18)
IONIZED CALCIUM, WHOLE BLOOD: 1.22 MMOL/L (ref 1.12–1.32)
LYMPHOCYTES # BLD: 30 %
LYMPHOCYTES ABSOLUTE: 2.9 THOU/MM3 (ref 1–4.8)
MCH RBC QN AUTO: 29.8 PG (ref 27–31)
MCHC RBC AUTO-ENTMCNC: 34.7 GM/DL (ref 33–37)
MCV RBC AUTO: 86 FL (ref 80–94)
METAMYELOCYTES: 5 %
MONOCYTES # BLD: 1 %
MONOCYTES ABSOLUTE: 0.1 THOU/MM3 (ref 0.4–1.3)
MYELOCYTES: 30 %
NUCLEATED RED BLOOD CELLS: 1 /100 WBC
PATHOLOGIST REVIEW: ABNORMAL
PDW BLD-RTO: 12.4 % (ref 11.5–14.5)
PLATELET # BLD: 100 THOU/MM3 (ref 130–400)
PMV BLD AUTO: 6.5 FL (ref 7.4–10.4)
POTASSIUM, WHOLE BLOOD: 4 MEQ/L (ref 3.5–4.9)
PROMYELOCYTES: 2 %
RBC # BLD: 3.29 MILL/MM3 (ref 4.7–6.1)
SCAN OF BLOOD SMEAR: NORMAL
SEG NEUTROPHILS: 23 %
SEGMENTED NEUTROPHILS ABSOLUTE COUNT: 2.2 THOU/MM3 (ref 1.8–7.7)
SODIUM, WHOLE BLOOD: 136 MEQ/L (ref 138–146)
TOTAL CO2, WHOLE BLOOD: 25 MEQ/L (ref 23–33)
TOTAL PROTEIN: 6.9 G/DL (ref 6.1–8)
WBC # BLD: 9.7 THOU/MM3 (ref 4.8–10.8)

## 2019-08-19 PROCEDURE — 85025 COMPLETE CBC W/AUTO DIFF WBC: CPT

## 2019-08-19 PROCEDURE — 2580000003 HC RX 258: Performed by: INTERNAL MEDICINE

## 2019-08-19 PROCEDURE — 36415 COLL VENOUS BLD VENIPUNCTURE: CPT

## 2019-08-19 PROCEDURE — 36592 COLLECT BLOOD FROM PICC: CPT

## 2019-08-19 PROCEDURE — 80047 BASIC METABLC PNL IONIZED CA: CPT

## 2019-08-19 PROCEDURE — 80076 HEPATIC FUNCTION PANEL: CPT

## 2019-08-19 RX ORDER — 0.9 % SODIUM CHLORIDE 0.9 %
250 INTRAVENOUS SOLUTION INTRAVENOUS ONCE
Status: CANCELLED
Start: 2019-08-19

## 2019-08-19 RX ORDER — HEPARIN SODIUM (PORCINE) LOCK FLUSH IV SOLN 100 UNIT/ML 100 UNIT/ML
500 SOLUTION INTRAVENOUS PRN
Status: CANCELLED | OUTPATIENT
Start: 2019-08-19

## 2019-08-19 RX ORDER — SODIUM CHLORIDE 0.9 % (FLUSH) 0.9 %
20 SYRINGE (ML) INJECTION PRN
Status: DISCONTINUED | OUTPATIENT
Start: 2019-08-19 | End: 2019-08-20 | Stop reason: HOSPADM

## 2019-08-19 RX ORDER — SODIUM CHLORIDE 0.9 % (FLUSH) 0.9 %
10 SYRINGE (ML) INJECTION PRN
Status: DISCONTINUED | OUTPATIENT
Start: 2019-08-19 | End: 2019-08-20 | Stop reason: HOSPADM

## 2019-08-19 RX ORDER — SODIUM CHLORIDE 0.9 % (FLUSH) 0.9 %
10 SYRINGE (ML) INJECTION PRN
Status: CANCELLED | OUTPATIENT
Start: 2019-08-19

## 2019-08-19 RX ORDER — SODIUM CHLORIDE 0.9 % (FLUSH) 0.9 %
20 SYRINGE (ML) INJECTION PRN
Status: CANCELLED | OUTPATIENT
Start: 2019-08-19

## 2019-08-19 RX ADMIN — Medication 10 ML: at 09:21

## 2019-08-19 RX ADMIN — Medication 20 ML: at 09:21

## 2019-08-19 RX ADMIN — Medication 10 ML: at 09:20

## 2019-08-19 ASSESSMENT — PAIN DESCRIPTION - PAIN TYPE: TYPE: CHRONIC PAIN

## 2019-08-19 ASSESSMENT — PAIN SCALES - GENERAL: PAINLEVEL_OUTOF10: 3

## 2019-08-19 ASSESSMENT — PAIN DESCRIPTION - ORIENTATION: ORIENTATION: LOWER

## 2019-08-19 ASSESSMENT — PAIN DESCRIPTION - LOCATION: LOCATION: BACK

## 2019-08-19 ASSESSMENT — PAIN DESCRIPTION - DESCRIPTORS: DESCRIPTORS: ACHING;CONSTANT;THROBBING

## 2019-08-19 NOTE — PROGRESS NOTES
Pt tolerated lab draw via PICC line without any complications. Lab results reviewed with patient. Patient verbalizes understanding. Discharge instructions given to patient. Patient verbalizes understanding. Pt ambulated off unit per self with belongings.

## 2019-08-19 NOTE — PLAN OF CARE
Problem: Discharge Planning  Goal: Knowledge of discharge instructions  Description  Knowledge of discharge instructions     Outcome: Met This Shift  Note:   Patient verbalizes understanding of discharge instructions, follow up appointment, and when to call physician if needed   Intervention: Interaction with patient/family and care team  Note:   Discharge instructions given to pt and reviewed. Follow up appointments discussed. Problem: Infection - Central Venous Catheter-Associated Bloodstream Infection:  Goal: Will show no infection signs and symptoms  Description  Will show no infection signs and symptoms  Outcome: Met This Shift  Note:   PICC site with no redness,warmth or drainage. Patient verbalizes signs/symptoms of port infection and when to notify the physician. Intervention: Infection risk assessment  Note:   Discuss PICC , infection prevention, signs of infection and when to call the doctor. Care plan reviewed with patient. Patient verbalizes understanding of the plan of care and contribute to goal setting.

## 2019-08-23 ENCOUNTER — HOSPITAL ENCOUNTER (OUTPATIENT)
Dept: INFUSION THERAPY | Age: 54
Discharge: HOME | End: 2019-08-23
Payer: COMMERCIAL

## 2019-08-23 DIAGNOSIS — C95.01 ACUTE LEUKEMIA IN REMISSION (HCC): Primary | ICD-10-CM

## 2019-08-23 LAB
BASOPHILS # BLD: 0 %
BASOPHILS ABSOLUTE: 0 THOU/MM3 (ref 0–0.1)
BLASTS: 4 %
DIFFERENTIAL, MANUAL: NORMAL
EOSINOPHIL # BLD: 0 %
EOSINOPHILS ABSOLUTE: 0 THOU/MM3 (ref 0–0.4)
ERYTHROCYTE [DISTWIDTH] IN BLOOD BY AUTOMATED COUNT: 14.8 % (ref 11.5–14.5)
ERYTHROCYTE [DISTWIDTH] IN BLOOD BY AUTOMATED COUNT: 49.2 FL (ref 35–45)
HCT VFR BLD CALC: 28.9 % (ref 42–52)
HEMOGLOBIN: 9.1 GM/DL (ref 14–18)
LYMPHOCYTES # BLD: 11 %
LYMPHOCYTES ABSOLUTE: 4 THOU/MM3 (ref 1–4.8)
MCH RBC QN AUTO: 28.7 PG (ref 26–33)
MCHC RBC AUTO-ENTMCNC: 31.5 GM/DL (ref 32.2–35.5)
MCV RBC AUTO: 91.2 FL (ref 80–94)
METAMYELOCYTES: 8 %
MONOCYTES # BLD: 7 %
MONOCYTES ABSOLUTE: 2.5 THOU/MM3 (ref 0.4–1.3)
MYELOCYTES: 20 %
NUCLEATED RED BLOOD CELLS: 4 /100 WBC
PATHOLOGIST REVIEW: ABNORMAL
PLATELET # BLD: 208 THOU/MM3 (ref 130–400)
PMV BLD AUTO: 8.7 FL (ref 9.4–12.4)
PROMYELOCYTES: 2 %
RBC # BLD: 3.17 MILL/MM3 (ref 4.7–6.1)
SEG NEUTROPHILS: 48 %
SEGMENTED NEUTROPHILS ABSOLUTE COUNT: 17.3 THOU/MM3 (ref 1.8–7.7)
WBC # BLD: 36 THOU/MM3 (ref 4.8–10.8)

## 2019-08-23 PROCEDURE — 85025 COMPLETE CBC W/AUTO DIFF WBC: CPT

## 2019-08-23 PROCEDURE — 36415 COLL VENOUS BLD VENIPUNCTURE: CPT

## 2019-08-23 PROCEDURE — 2580000003 HC RX 258: Performed by: INTERNAL MEDICINE

## 2019-08-23 PROCEDURE — 36592 COLLECT BLOOD FROM PICC: CPT

## 2019-08-23 PROCEDURE — 99212 OFFICE O/P EST SF 10 MIN: CPT

## 2019-08-23 RX ORDER — HEPARIN SODIUM (PORCINE) LOCK FLUSH IV SOLN 100 UNIT/ML 100 UNIT/ML
500 SOLUTION INTRAVENOUS PRN
Status: CANCELLED | OUTPATIENT
Start: 2019-08-23

## 2019-08-23 RX ORDER — SODIUM CHLORIDE 0.9 % (FLUSH) 0.9 %
20 SYRINGE (ML) INJECTION PRN
Status: CANCELLED | OUTPATIENT
Start: 2019-08-23

## 2019-08-23 RX ORDER — SODIUM CHLORIDE 0.9 % (FLUSH) 0.9 %
20 SYRINGE (ML) INJECTION PRN
Status: DISCONTINUED | OUTPATIENT
Start: 2019-08-23 | End: 2019-08-24 | Stop reason: HOSPADM

## 2019-08-23 RX ORDER — 0.9 % SODIUM CHLORIDE 0.9 %
250 INTRAVENOUS SOLUTION INTRAVENOUS ONCE
Status: CANCELLED
Start: 2019-08-23

## 2019-08-23 RX ORDER — SODIUM CHLORIDE 0.9 % (FLUSH) 0.9 %
10 SYRINGE (ML) INJECTION PRN
Status: CANCELLED | OUTPATIENT
Start: 2019-08-23

## 2019-08-23 RX ADMIN — Medication 20 ML: at 09:27

## 2019-08-23 RX ADMIN — Medication 20 ML: at 09:29

## 2019-08-23 RX ADMIN — Medication 20 ML: at 09:28

## 2019-08-23 NOTE — PLAN OF CARE
Care plan reviewed with patient. Patient verbalized understanding of the plan of care and contribute to goal setting. Problem: Discharge Planning  Goal: Knowledge of discharge instructions  Description  Knowledge of discharge instructions     Outcome: Met This Shift  Note:   Verbalized understanding of discharge instructions, follow ups and when to call doctor   Intervention: Interaction with patient/family and care team  Note:   Discuss discharge instructions, follow ups and when to call doctor. Intervention: Discharge to appropriate level of care  Note:   Free from falls while in infusion center. Verbalized understanding of fall prevention and to ask for assistance with ambulation      Problem: Infection - Central Venous Catheter-Associated Bloodstream Infection:  Goal: Will show no infection signs and symptoms  Description  Will show no infection signs and symptoms  Outcome: Met This Shift  Note:   PICC site with no redness, warmth, or draiange. Patient verbalizes signs/symptoms of port infection and when to notify the physician. Intervention: Central line needs assessment  Description  Central line needs assessment  Note:   Discuss PICC maintenance, infection prevention, signs and when to call Dr      Problem: Falls - Risk of:  Goal: Will remain free from falls  Description  Will remain free from falls  Outcome: Met This Shift  Note:   Free from falls while in infusion center.  Verbalized understanding of fall prevention and to ask for assistance with ambulation   Intervention: Assess risk factors for falls  Description  Assess risk factors for falls  Note:   Assess for fall risk, instruct to ask for assistance with ambulation

## 2019-08-23 NOTE — PROGRESS NOTES
Labs draw and PICC dressing changed per protocol tolerated well. Patient states he is going to [facility] next week and will call when he will need labs again. Discharged in satisfactory condition.  Ambulated off unit per self

## 2019-09-09 ENCOUNTER — HOSPITAL ENCOUNTER (OUTPATIENT)
Dept: INFUSION THERAPY | Age: 54
Discharge: HOME | End: 2019-09-09
Payer: COMMERCIAL

## 2019-09-09 VITALS
BODY MASS INDEX: 29.89 KG/M2 | HEIGHT: 71 IN | WEIGHT: 213.5 LBS | RESPIRATION RATE: 18 BRPM | OXYGEN SATURATION: 99 % | HEART RATE: 73 BPM | SYSTOLIC BLOOD PRESSURE: 108 MMHG | TEMPERATURE: 97.9 F | DIASTOLIC BLOOD PRESSURE: 65 MMHG

## 2019-09-09 DIAGNOSIS — C95.01 ACUTE LEUKEMIA IN REMISSION (HCC): Primary | ICD-10-CM

## 2019-09-09 LAB
ALBUMIN SERPL-MCNC: 4.1 G/DL (ref 3.5–5.1)
ALP BLD-CCNC: 57 U/L (ref 38–126)
ALT SERPL-CCNC: 20 U/L (ref 11–66)
AST SERPL-CCNC: 13 U/L (ref 5–40)
BASOPHILS # BLD: 1 %
BASOPHILS ABSOLUTE: 0 THOU/MM3 (ref 0–0.1)
BILIRUB SERPL-MCNC: 0.7 MG/DL (ref 0.3–1.2)
BILIRUBIN DIRECT: < 0.2 MG/DL (ref 0–0.3)
BUN, WHOLE BLOOD: 21 MG/DL (ref 8–26)
CHLORIDE, WHOLE BLOOD: 101 MEQ/L (ref 98–109)
CREATININE, WHOLE BLOOD: 0.9 MG/DL (ref 0.5–1.2)
DIFFERENTIAL, MANUAL: NORMAL
EOSINOPHIL # BLD: 0 %
EOSINOPHILS ABSOLUTE: 0 THOU/MM3 (ref 0–0.4)
GFR, ESTIMATED: > 90 ML/MIN/1.73M2
GLUCOSE, WHOLE BLOOD: 89 MG/DL (ref 70–108)
HCT VFR BLD CALC: 25.3 % (ref 42–52)
HEMOGLOBIN: 8.5 GM/DL (ref 14–18)
IONIZED CALCIUM, WHOLE BLOOD: 1.22 MMOL/L (ref 1.12–1.32)
LYMPHOCYTES # BLD: 8 %
LYMPHOCYTES ABSOLUTE: 0.2 THOU/MM3 (ref 1–4.8)
MCH RBC QN AUTO: 29.1 PG (ref 27–31)
MCHC RBC AUTO-ENTMCNC: 33.8 GM/DL (ref 33–37)
MCV RBC AUTO: 86 FL (ref 80–94)
METAMYELOCYTES: 0 %
MONOCYTES # BLD: 1 %
MONOCYTES ABSOLUTE: 0 THOU/MM3 (ref 0.4–1.3)
NUCLEATED RED BLOOD CELLS: 0 /100 WBC
PDW BLD-RTO: 13.1 % (ref 11.5–14.5)
PLATELET # BLD: 152 THOU/MM3 (ref 130–400)
PLATELET ESTIMATE: ADEQUATE
PMV BLD AUTO: 5.9 FL (ref 7.4–10.4)
POTASSIUM, WHOLE BLOOD: 4.1 MEQ/L (ref 3.5–4.9)
RBC # BLD: 2.93 MILL/MM3 (ref 4.7–6.1)
SEG NEUTROPHILS: 90 %
SEGMENTED NEUTROPHILS ABSOLUTE COUNT: 1.9 THOU/MM3 (ref 1.8–7.7)
SODIUM, WHOLE BLOOD: 137 MEQ/L (ref 138–146)
TOTAL CO2, WHOLE BLOOD: 24 MEQ/L (ref 23–33)
TOTAL PROTEIN: 6.3 G/DL (ref 6.1–8)
WBC # BLD: 2.1 THOU/MM3 (ref 4.8–10.8)

## 2019-09-09 PROCEDURE — 2580000003 HC RX 258: Performed by: INTERNAL MEDICINE

## 2019-09-09 PROCEDURE — 80047 BASIC METABLC PNL IONIZED CA: CPT

## 2019-09-09 PROCEDURE — 36592 COLLECT BLOOD FROM PICC: CPT

## 2019-09-09 PROCEDURE — 36415 COLL VENOUS BLD VENIPUNCTURE: CPT

## 2019-09-09 PROCEDURE — 85025 COMPLETE CBC W/AUTO DIFF WBC: CPT

## 2019-09-09 PROCEDURE — 80076 HEPATIC FUNCTION PANEL: CPT

## 2019-09-09 RX ORDER — SODIUM CHLORIDE 0.9 % (FLUSH) 0.9 %
20 SYRINGE (ML) INJECTION PRN
Status: CANCELLED | OUTPATIENT
Start: 2019-09-09

## 2019-09-09 RX ORDER — SODIUM CHLORIDE 0.9 % (FLUSH) 0.9 %
20 SYRINGE (ML) INJECTION PRN
Status: DISCONTINUED | OUTPATIENT
Start: 2019-09-09 | End: 2019-09-10 | Stop reason: HOSPADM

## 2019-09-09 RX ORDER — SODIUM CHLORIDE 0.9 % (FLUSH) 0.9 %
10 SYRINGE (ML) INJECTION PRN
Status: DISCONTINUED | OUTPATIENT
Start: 2019-09-09 | End: 2019-09-10 | Stop reason: HOSPADM

## 2019-09-09 RX ORDER — 0.9 % SODIUM CHLORIDE 0.9 %
250 INTRAVENOUS SOLUTION INTRAVENOUS ONCE
Status: CANCELLED
Start: 2019-09-09

## 2019-09-09 RX ORDER — HEPARIN SODIUM (PORCINE) LOCK FLUSH IV SOLN 100 UNIT/ML 100 UNIT/ML
500 SOLUTION INTRAVENOUS PRN
Status: DISCONTINUED | OUTPATIENT
Start: 2019-09-09 | End: 2019-09-10 | Stop reason: HOSPADM

## 2019-09-09 RX ORDER — HEPARIN SODIUM (PORCINE) LOCK FLUSH IV SOLN 100 UNIT/ML 100 UNIT/ML
500 SOLUTION INTRAVENOUS PRN
Status: CANCELLED | OUTPATIENT
Start: 2019-09-09

## 2019-09-09 RX ORDER — SODIUM CHLORIDE 0.9 % (FLUSH) 0.9 %
10 SYRINGE (ML) INJECTION PRN
Status: CANCELLED | OUTPATIENT
Start: 2019-09-09

## 2019-09-09 RX ADMIN — Medication 20 ML: at 08:56

## 2019-09-09 RX ADMIN — Medication 10 ML: at 08:55

## 2019-09-09 NOTE — PROGRESS NOTES
Patient tolerated central line flush and lab draw without any complications. Last vital signs:   /65   Pulse 73   Temp 97.9 °F (36.6 °C) (Oral)   Resp 18   Ht 5' 11\" (1.803 m)   Wt 213 lb 8 oz (96.8 kg)   SpO2 99%   BMI 29.78 kg/m²     Patient instructed if experience any of the above symptoms following today's infusion, he is to notify MD immediately or go to the emergency department. Discharge instructions given to patient. Verbalizes understanding. Ambulated off unit per self with belongings.

## 2019-09-09 NOTE — PLAN OF CARE
Problem: Discharge Planning  Goal: Knowledge of discharge instructions  Description  Knowledge of discharge instructions     Outcome: Met This Shift  Note:   Verbalized understanding of discharge instructions, follow-up appointments, and when to call the physician. Intervention: Interaction with patient/family and care team  Note:   Discuss understanding of discharge instructions,follow-up appointments, and when to call the physician. Problem: Infection - Central Venous Catheter-Associated Bloodstream Infection:  Goal: Will show no infection signs and symptoms  Description  Will show no infection signs and symptoms  Outcome: Met This Shift  Note:   PICC site with no redness,warmth or drainage. Patient verbalizes signs/symptoms of port infection and when to notify the physician. Intervention: Central line needs assessment  Description  Central line needs assessment  Note:   Discuss port maintenance, infection prevention, signs and when to call Dr Jonathon Dumont reviewed with patient. Patient verbalize understanding of the plan of care and contribute to goal setting.

## 2019-09-09 NOTE — PROGRESS NOTES
Patient report last evening at 3 am, upper abdominal pain, awoke him from sleeping, didn't know if it was severe gas pains. 2nd time within 3 weeks.

## 2019-09-12 ENCOUNTER — HOSPITAL ENCOUNTER (OUTPATIENT)
Dept: INFUSION THERAPY | Age: 54
Discharge: HOME | End: 2019-09-12
Payer: COMMERCIAL

## 2019-09-12 VITALS
OXYGEN SATURATION: 98 % | DIASTOLIC BLOOD PRESSURE: 63 MMHG | TEMPERATURE: 98.2 F | SYSTOLIC BLOOD PRESSURE: 107 MMHG | HEART RATE: 76 BPM | RESPIRATION RATE: 18 BRPM

## 2019-09-12 DIAGNOSIS — C95.01 ACUTE LEUKEMIA IN REMISSION (HCC): Primary | ICD-10-CM

## 2019-09-12 LAB
BASINOPHIL, AUTOMATED: 1 % (ref 0–3)
EOSINOPHILS RELATIVE PERCENT: 0 % (ref 0–4)
HCT VFR BLD CALC: 23.2 % (ref 42–52)
HEMOGLOBIN: 8.2 GM/DL (ref 14–18)
LYMPHOCYTES # BLD: 66 % (ref 15–47)
MCH RBC QN AUTO: 30 PG (ref 27–31)
MCHC RBC AUTO-ENTMCNC: 35.4 GM/DL (ref 33–37)
MCV RBC AUTO: 85 FL (ref 80–94)
MONOCYTES: 11 % (ref 0–12)
PDW BLD-RTO: 13.4 % (ref 11.5–14.5)
PLATELET # BLD: 7 THOU/MM3 (ref 130–400)
PMV BLD AUTO: 7.9 FL (ref 7.4–10.4)
RBC # BLD: 2.74 MILL/MM3 (ref 4.7–6.1)
REVIEWED BY: NORMAL
SEG NEUTROPHILS: 22 % (ref 43–75)
SMEAR REVIEW: NORMAL
WBC # BLD: 0.6 THOU/MM3 (ref 4.8–10.8)

## 2019-09-12 PROCEDURE — P9037 PLATE PHERES LEUKOREDU IRRAD: HCPCS

## 2019-09-12 PROCEDURE — 85025 COMPLETE CBC W/AUTO DIFF WBC: CPT

## 2019-09-12 PROCEDURE — 36415 COLL VENOUS BLD VENIPUNCTURE: CPT

## 2019-09-12 PROCEDURE — 2580000003 HC RX 258: Performed by: INTERNAL MEDICINE

## 2019-09-12 PROCEDURE — 99213 OFFICE O/P EST LOW 20 MIN: CPT

## 2019-09-12 PROCEDURE — 36430 TRANSFUSION BLD/BLD COMPNT: CPT

## 2019-09-12 PROCEDURE — 36592 COLLECT BLOOD FROM PICC: CPT

## 2019-09-12 RX ORDER — SODIUM CHLORIDE 0.9 % (FLUSH) 0.9 %
10 SYRINGE (ML) INJECTION PRN
Status: DISCONTINUED | OUTPATIENT
Start: 2019-09-12 | End: 2019-09-13 | Stop reason: HOSPADM

## 2019-09-12 RX ORDER — 0.9 % SODIUM CHLORIDE 0.9 %
250 INTRAVENOUS SOLUTION INTRAVENOUS ONCE
Status: CANCELLED
Start: 2019-09-12

## 2019-09-12 RX ORDER — SODIUM CHLORIDE 0.9 % (FLUSH) 0.9 %
20 SYRINGE (ML) INJECTION PRN
Status: DISCONTINUED | OUTPATIENT
Start: 2019-09-12 | End: 2019-09-13 | Stop reason: HOSPADM

## 2019-09-12 RX ORDER — HEPARIN SODIUM (PORCINE) LOCK FLUSH IV SOLN 100 UNIT/ML 100 UNIT/ML
500 SOLUTION INTRAVENOUS PRN
Status: CANCELLED | OUTPATIENT
Start: 2019-09-12

## 2019-09-12 RX ORDER — 0.9 % SODIUM CHLORIDE 0.9 %
250 INTRAVENOUS SOLUTION INTRAVENOUS ONCE
Status: COMPLETED | OUTPATIENT
Start: 2019-09-12 | End: 2019-09-12

## 2019-09-12 RX ORDER — SODIUM CHLORIDE 0.9 % (FLUSH) 0.9 %
20 SYRINGE (ML) INJECTION PRN
Status: CANCELLED | OUTPATIENT
Start: 2019-09-12

## 2019-09-12 RX ORDER — SODIUM CHLORIDE 0.9 % (FLUSH) 0.9 %
10 SYRINGE (ML) INJECTION PRN
Status: CANCELLED | OUTPATIENT
Start: 2019-09-12

## 2019-09-12 RX ADMIN — Medication 20 ML: at 13:44

## 2019-09-12 RX ADMIN — Medication 10 ML: at 08:50

## 2019-09-12 RX ADMIN — Medication 20 ML: at 08:51

## 2019-09-12 RX ADMIN — SODIUM CHLORIDE 250 ML: 9 INJECTION, SOLUTION INTRAVENOUS at 11:30

## 2019-09-12 RX ADMIN — Medication 10 ML: at 11:30

## 2019-09-12 RX ADMIN — Medication 10 ML: at 08:51

## 2019-09-12 RX ADMIN — Medication 20 ML: at 08:52

## 2019-09-12 NOTE — PLAN OF CARE
Problem: Discharge Planning  Goal: Knowledge of discharge instructions  Description  Knowledge of discharge instructions     Outcome: Met This Shift  Note:   Verbalized understanding of discharge instructions, follow-up appointments, and when to call the physician. Intervention: Interaction with patient/family and care team  Note:   Discuss understanding of discharge instructions,follow-up appointments, and when to call the physician. Problem: Intellectual/Education/Knowledge Deficit  Goal: Teaching initiated upon admission  Outcome: Met This Shift  Note:   Patient verbalize understanding to  verbal and written information given  on platelet transfusion,procedure ,and possible reaction. Instructed to call MD if develop any complications once discharged. Intervention: Verbal/written education provided  Note:   Patient educated blood product transfusion protocol:    Patient receiving 1 unit of platelets:      - Blood product transfusion information sheet given: questions answered and consent signed  - Take vital signs/ monitor lungs sound prior to transfusion  - Monitor patient for 15 minutes after transfusion started  - Take vital signs / monitor lungs sound in 15 minutes and post transfusion  - Assess IV site   - Monitor patient closely for potential transfusion reaction    Call MD if develop complications once discharged. Problem: Infection - Central Venous Catheter-Associated Bloodstream Infection:  Goal: Will show no infection signs and symptoms  Description  Will show no infection signs and symptoms  Outcome: Met This Shift  Note:   PICC site with no redness,warmth or drainage. Patient verbalizes signs/symptoms of port infection and when to notify the physician.    Intervention: Central line needs assessment  Description  Central line needs assessment  Note:   Discuss port maintenance, infection prevention, signs and when to call       Problem: Falls - Risk of:  Goal: Will remain free from falls  Description  Will remain free from falls  Outcome: Met This Shift  Note:   Free from falls while in [facility]. Intervention: Assess risk factors for falls  Description  Assess risk factors for falls  Note:   Discussed the need to use the call light for assistance when getting up to ambulate. Care plan reviewed with patient. Patient verbalize understanding of the plan of care and contribute to goal setting.

## 2019-09-12 NOTE — PROGRESS NOTES
Patient tolerated transfusion of platelets without any complications. Patient kept for 20 minutes observation post transfusion. Denies dizziness, lightheadedness, acute nausea or vomiting, headache, chest pain/pressure, rash/itching, or an increase in SOB. Last vital signs:   /63   Pulse 76   Temp 98.2 °F (36.8 °C) (Oral)   Resp 18 Comment: clear  SpO2 98%     Patient instructed if they experience any of the above symptoms following today's transfusion, he is to notify the physician immediately or go to the emergency department. Discharge instructions given to patient. Verbalizes understanding. Ambulated off unit per self, with belongings.

## 2019-09-13 ENCOUNTER — HOSPITAL ENCOUNTER (OUTPATIENT)
Dept: INFUSION THERAPY | Age: 54
Discharge: HOME | End: 2019-09-13
Payer: COMMERCIAL

## 2019-09-13 VITALS
DIASTOLIC BLOOD PRESSURE: 89 MMHG | TEMPERATURE: 98.7 F | RESPIRATION RATE: 18 BRPM | SYSTOLIC BLOOD PRESSURE: 121 MMHG | WEIGHT: 213 LBS | OXYGEN SATURATION: 96 % | HEART RATE: 86 BPM | BODY MASS INDEX: 29.82 KG/M2 | HEIGHT: 71 IN

## 2019-09-13 DIAGNOSIS — C95.01 ACUTE LEUKEMIA IN REMISSION (HCC): Primary | ICD-10-CM

## 2019-09-13 LAB
BASINOPHIL, AUTOMATED: 0 % (ref 0–3)
EOSINOPHILS RELATIVE PERCENT: 0 % (ref 0–4)
HCT VFR BLD CALC: 23.3 % (ref 42–52)
HEMOGLOBIN: 8.1 GM/DL (ref 14–18)
LYMPHOCYTES # BLD: 88 % (ref 15–47)
MCH RBC QN AUTO: 29.5 PG (ref 27–31)
MCHC RBC AUTO-ENTMCNC: 34.8 GM/DL (ref 33–37)
MCV RBC AUTO: 85 FL (ref 80–94)
MONOCYTES: 8 % (ref 0–12)
PDW BLD-RTO: 13.4 % (ref 11.5–14.5)
PLATELET # BLD: 10 THOU/MM3 (ref 130–400)
PMV BLD AUTO: 8.6 FL (ref 7.4–10.4)
RBC # BLD: 2.75 MILL/MM3 (ref 4.7–6.1)
SEG NEUTROPHILS: 4 % (ref 43–75)
WBC # BLD: 0.4 THOU/MM3 (ref 4.8–10.8)

## 2019-09-13 PROCEDURE — 99211 OFF/OP EST MAY X REQ PHY/QHP: CPT

## 2019-09-13 PROCEDURE — 36430 TRANSFUSION BLD/BLD COMPNT: CPT

## 2019-09-13 PROCEDURE — 85025 COMPLETE CBC W/AUTO DIFF WBC: CPT

## 2019-09-13 PROCEDURE — 2580000003 HC RX 258: Performed by: INTERNAL MEDICINE

## 2019-09-13 PROCEDURE — P9037 PLATE PHERES LEUKOREDU IRRAD: HCPCS

## 2019-09-13 PROCEDURE — 36592 COLLECT BLOOD FROM PICC: CPT

## 2019-09-13 PROCEDURE — 36415 COLL VENOUS BLD VENIPUNCTURE: CPT

## 2019-09-13 RX ORDER — METHYLPREDNISOLONE SODIUM SUCCINATE 125 MG/2ML
125 INJECTION, POWDER, LYOPHILIZED, FOR SOLUTION INTRAMUSCULAR; INTRAVENOUS ONCE
Status: CANCELLED | OUTPATIENT
Start: 2019-09-13

## 2019-09-13 RX ORDER — FUROSEMIDE 10 MG/ML
20 INJECTION INTRAMUSCULAR; INTRAVENOUS ONCE
Status: CANCELLED | OUTPATIENT
Start: 2019-09-13

## 2019-09-13 RX ORDER — SODIUM CHLORIDE 0.9 % (FLUSH) 0.9 %
20 SYRINGE (ML) INJECTION PRN
Status: CANCELLED | OUTPATIENT
Start: 2019-09-13

## 2019-09-13 RX ORDER — SODIUM CHLORIDE 9 MG/ML
INJECTION, SOLUTION INTRAVENOUS CONTINUOUS
Status: CANCELLED | OUTPATIENT
Start: 2019-09-13

## 2019-09-13 RX ORDER — 0.9 % SODIUM CHLORIDE 0.9 %
10 VIAL (ML) INJECTION ONCE
Status: CANCELLED | OUTPATIENT
Start: 2019-09-13

## 2019-09-13 RX ORDER — 0.9 % SODIUM CHLORIDE 0.9 %
250 INTRAVENOUS SOLUTION INTRAVENOUS ONCE
Status: COMPLETED | OUTPATIENT
Start: 2019-09-13 | End: 2019-09-13

## 2019-09-13 RX ORDER — SODIUM CHLORIDE 0.9 % (FLUSH) 0.9 %
10 SYRINGE (ML) INJECTION PRN
Status: CANCELLED | OUTPATIENT
Start: 2019-09-13

## 2019-09-13 RX ORDER — SODIUM CHLORIDE 0.9 % (FLUSH) 0.9 %
20 SYRINGE (ML) INJECTION PRN
Status: DISCONTINUED | OUTPATIENT
Start: 2019-09-13 | End: 2019-09-14 | Stop reason: HOSPADM

## 2019-09-13 RX ORDER — HEPARIN SODIUM (PORCINE) LOCK FLUSH IV SOLN 100 UNIT/ML 100 UNIT/ML
500 SOLUTION INTRAVENOUS PRN
Status: CANCELLED | OUTPATIENT
Start: 2019-09-13

## 2019-09-13 RX ORDER — SODIUM CHLORIDE 0.9 % (FLUSH) 0.9 %
10 SYRINGE (ML) INJECTION PRN
Status: DISCONTINUED | OUTPATIENT
Start: 2019-09-13 | End: 2019-09-14 | Stop reason: HOSPADM

## 2019-09-13 RX ORDER — 0.9 % SODIUM CHLORIDE 0.9 %
250 INTRAVENOUS SOLUTION INTRAVENOUS ONCE
Status: CANCELLED
Start: 2019-09-13

## 2019-09-13 RX ORDER — DIPHENHYDRAMINE HYDROCHLORIDE 50 MG/ML
50 INJECTION INTRAMUSCULAR; INTRAVENOUS ONCE
Status: CANCELLED | OUTPATIENT
Start: 2019-09-13

## 2019-09-13 RX ADMIN — Medication 10 ML: at 10:00

## 2019-09-13 RX ADMIN — Medication 20 ML: at 14:37

## 2019-09-13 RX ADMIN — SODIUM CHLORIDE 250 ML: 9 INJECTION, SOLUTION INTRAVENOUS at 10:00

## 2019-09-13 RX ADMIN — Medication 20 ML: at 09:06

## 2019-09-13 RX ADMIN — Medication 10 ML: at 09:05

## 2019-09-13 ASSESSMENT — PAIN DESCRIPTION - PAIN TYPE: TYPE: ACUTE PAIN

## 2019-09-13 ASSESSMENT — PAIN SCALES - GENERAL: PAINLEVEL_OUTOF10: 3

## 2019-09-13 ASSESSMENT — PAIN DESCRIPTION - LOCATION: LOCATION: HEAD

## 2019-09-13 NOTE — PLAN OF CARE
Problem: Pain:  Description  Pain management should include both nonpharmacologic and pharmacologic interventions. Goal: Pain level will decrease  Description  Pain level will decrease  Outcome: Met This Shift  Note:   Patient rating pain a 2 out of 10 using CARLOS scale, Pain located in headache. Goal: Control of acute pain  Description  Control of acute pain  Outcome: Met This Shift  Goal: Control of chronic pain  Description  Control of chronic pain  Outcome: Met This Shift  Intervention: Opioid analgesia side-effects  Description  Opioid analgesia side-effects - REMINDER(s):  Bradycardia. Confusion. Constipation. Respiratory function, decreased. Note:   Patient encouraged to take prescribed pain medications and call Physician if pain is not controlled. Problem: Discharge Planning  Goal: Knowledge of discharge instructions  Description  Knowledge of discharge instructions     Outcome: Met This Shift  Note:   Verbalized understanding of discharge instructions, follow-up appointments, and when to call the physician. Intervention: Interaction with patient/family and care team  Note:   Discuss understanding of discharge instructions,follow-up appointments, and when to call the physician. Problem: Intellectual/Education/Knowledge Deficit  Goal: Teaching initiated upon admission  Outcome: Met This Shift  Note:   Patient verbalize understanding to  verbal and written information given  on platelet transfusion,procedure ,and possible reaction. Instructed to call MD if develop any complications once discharged.       Intervention: Verbal/written education provided  Note:   Patient educated blood product transfusion protocol:    Patient receiving 2 units of platelets:      - Blood product transfusion information sheet given: questions answered and consent signed  - Take vital signs/ monitor lungs sound prior to transfusion  - Monitor patient for 15 minutes after transfusion started  - Take vital signs / monitor lungs sound in 15 minutes and post transfusion  - Assess IV site   - Monitor patient closely for potential transfusion reaction    Call MD if develop complications once discharged. Problem: Falls - Risk of:  Goal: Will remain free from falls  Description  Will remain free from falls  Outcome: Met This Shift  Note:   Free from falls while in [facility] Oncology. Intervention: Assess risk factors for falls  Description  Assess risk factors for falls  Note:   Discussed the need to use the call light for assistance when getting up to ambulate. Problem: Infection - Central Venous Catheter-Associated Bloodstream Infection:  Goal: Will show no infection signs and symptoms  Description  Will show no infection signs and symptoms  Outcome: Met This Shift  Note:   PICC site with no redness,warmth or drainage. Patient verbalizes signs/symptoms of port infection and when to notify the physician. Intervention: Central line needs assessment  Description  Central line needs assessment  Note:   Discuss port maintenance, infection prevention, signs and when to call Dr Radha Valencia reviewed with patient. Patient verbalize understanding of the plan of care and contribute to goal setting.

## 2019-09-13 NOTE — PROGRESS NOTES
Patient tolerated transfusion of 2 unit of platelets without any complications. Patient kept for 20 minutes observation post transfusion. Denies dizziness, lightheadedness, acute nausea or vomiting, headache, chest pain/pressure, rash/itching, or an increase in SOB. Last vital signs:   /89   Pulse 86   Temp 98.7 °F (37.1 °C) (Oral)   Resp 18 Comment: clear  Ht 5' 11\" (1.803 m)   Wt 213 lb (96.6 kg)   SpO2 96%   BMI 29.71 kg/m²     Patient instructed if they experience any of the above symptoms following today's transfusion, he is to notify the physician immediately or go to the emergency department. Discharge instructions given to patient. Verbalizes understanding. Ambulated off unit per self, with belongings. Re-enforced importance of bleeding precautions and neutropenia.

## 2019-09-16 ENCOUNTER — HOSPITAL ENCOUNTER (OUTPATIENT)
Dept: INFUSION THERAPY | Age: 54
Discharge: HOME | End: 2019-09-16
Payer: COMMERCIAL

## 2019-09-16 VITALS
BODY MASS INDEX: 29.26 KG/M2 | WEIGHT: 209 LBS | OXYGEN SATURATION: 98 % | RESPIRATION RATE: 18 BRPM | SYSTOLIC BLOOD PRESSURE: 104 MMHG | HEART RATE: 79 BPM | TEMPERATURE: 97.5 F | DIASTOLIC BLOOD PRESSURE: 56 MMHG | HEIGHT: 71 IN

## 2019-09-16 DIAGNOSIS — C95.01 ACUTE LEUKEMIA IN REMISSION (HCC): Primary | ICD-10-CM

## 2019-09-16 LAB
ALBUMIN SERPL-MCNC: 4 G/DL (ref 3.5–5.1)
ALP BLD-CCNC: 99 U/L (ref 38–126)
ALT SERPL-CCNC: 23 U/L (ref 11–66)
AST SERPL-CCNC: 20 U/L (ref 5–40)
BASOPHILS # BLD: 0 %
BASOPHILS ABSOLUTE: 0 THOU/MM3 (ref 0–0.1)
BILIRUB SERPL-MCNC: 0.4 MG/DL (ref 0.3–1.2)
BILIRUBIN DIRECT: < 0.2 MG/DL (ref 0–0.3)
BLASTS: 2 %
BUN, WHOLE BLOOD: 11 MG/DL (ref 8–26)
CHLORIDE, WHOLE BLOOD: 100 MEQ/L (ref 98–109)
CREATININE, WHOLE BLOOD: 1.3 MG/DL (ref 0.5–1.2)
DIFFERENTIAL TYPE: ABNORMAL
DIFFERENTIAL, MANUAL: NORMAL
DOHLE BODIES: PRESENT
EOSINOPHIL # BLD: 0 %
EOSINOPHILS ABSOLUTE: 0 THOU/MM3 (ref 0–0.4)
GFR, ESTIMATED: 61 ML/MIN/1.73M2
GLUCOSE, WHOLE BLOOD: 108 MG/DL (ref 70–108)
HCT VFR BLD CALC: 21.8 % (ref 42–52)
HEMOGLOBIN: 7.6 GM/DL (ref 14–18)
IONIZED CALCIUM, WHOLE BLOOD: 1.24 MMOL/L (ref 1.12–1.32)
LYMPHOCYTES # BLD: 10 %
LYMPHOCYTES ABSOLUTE: 2.4 THOU/MM3 (ref 1–4.8)
MCH RBC QN AUTO: 29.9 PG (ref 27–31)
MCHC RBC AUTO-ENTMCNC: 34.8 GM/DL (ref 33–37)
MCV RBC AUTO: 86 FL (ref 80–94)
METAMYELOCYTES: 6 %
MONOCYTES # BLD: 13 %
MONOCYTES ABSOLUTE: 3.2 THOU/MM3 (ref 0.4–1.3)
MYELOCYTES: 5 %
NUCLEATED RED BLOOD CELLS: 1 /100 WBC
PATHOLOGIST REVIEW: ABNORMAL
PDW BLD-RTO: 12.6 % (ref 11.5–14.5)
PLATELET # BLD: 58 THOU/MM3 (ref 130–400)
PLATELET ESTIMATE: ABNORMAL
PMV BLD AUTO: 7.2 FL (ref 7.4–10.4)
POTASSIUM, WHOLE BLOOD: 3.9 MEQ/L (ref 3.5–4.9)
RBC # BLD: 2.53 MILL/MM3 (ref 4.7–6.1)
SCAN OF BLOOD SMEAR: NORMAL
SEG NEUTROPHILS: 64 %
SEGMENTED NEUTROPHILS ABSOLUTE COUNT: 15.6 THOU/MM3 (ref 1.8–7.7)
SODIUM, WHOLE BLOOD: 137 MEQ/L (ref 138–146)
TOTAL CO2, WHOLE BLOOD: 23 MEQ/L (ref 23–33)
TOTAL PROTEIN: 6.5 G/DL (ref 6.1–8)
WBC # BLD: 24.4 THOU/MM3 (ref 4.8–10.8)

## 2019-09-16 PROCEDURE — 80047 BASIC METABLC PNL IONIZED CA: CPT

## 2019-09-16 PROCEDURE — 36415 COLL VENOUS BLD VENIPUNCTURE: CPT

## 2019-09-16 PROCEDURE — 96360 HYDRATION IV INFUSION INIT: CPT

## 2019-09-16 PROCEDURE — 99211 OFF/OP EST MAY X REQ PHY/QHP: CPT

## 2019-09-16 PROCEDURE — 80076 HEPATIC FUNCTION PANEL: CPT

## 2019-09-16 PROCEDURE — 2580000003 HC RX 258: Performed by: INTERNAL MEDICINE

## 2019-09-16 PROCEDURE — 85025 COMPLETE CBC W/AUTO DIFF WBC: CPT

## 2019-09-16 PROCEDURE — 36592 COLLECT BLOOD FROM PICC: CPT

## 2019-09-16 RX ORDER — SODIUM CHLORIDE 0.9 % (FLUSH) 0.9 %
10 SYRINGE (ML) INJECTION PRN
Status: CANCELLED | OUTPATIENT
Start: 2019-09-16

## 2019-09-16 RX ORDER — SODIUM CHLORIDE 0.9 % (FLUSH) 0.9 %
20 SYRINGE (ML) INJECTION PRN
Status: DISCONTINUED | OUTPATIENT
Start: 2019-09-16 | End: 2019-09-17 | Stop reason: HOSPADM

## 2019-09-16 RX ORDER — 0.9 % SODIUM CHLORIDE 0.9 %
250 INTRAVENOUS SOLUTION INTRAVENOUS ONCE
Status: CANCELLED
Start: 2019-09-16

## 2019-09-16 RX ORDER — 0.9 % SODIUM CHLORIDE 0.9 %
500 INTRAVENOUS SOLUTION INTRAVENOUS ONCE
Status: COMPLETED | OUTPATIENT
Start: 2019-09-16 | End: 2019-09-16

## 2019-09-16 RX ORDER — SODIUM CHLORIDE 0.9 % (FLUSH) 0.9 %
10 SYRINGE (ML) INJECTION PRN
Status: DISCONTINUED | OUTPATIENT
Start: 2019-09-16 | End: 2019-09-17 | Stop reason: HOSPADM

## 2019-09-16 RX ORDER — SODIUM CHLORIDE 0.9 % (FLUSH) 0.9 %
20 SYRINGE (ML) INJECTION PRN
Status: CANCELLED | OUTPATIENT
Start: 2019-09-16

## 2019-09-16 RX ORDER — HEPARIN SODIUM (PORCINE) LOCK FLUSH IV SOLN 100 UNIT/ML 100 UNIT/ML
500 SOLUTION INTRAVENOUS PRN
Status: CANCELLED | OUTPATIENT
Start: 2019-09-16

## 2019-09-16 RX ADMIN — SODIUM CHLORIDE 500 ML: 9 INJECTION, SOLUTION INTRAVENOUS at 10:00

## 2019-09-16 RX ADMIN — Medication 20 ML: at 09:16

## 2019-09-16 RX ADMIN — Medication 20 ML: at 11:18

## 2019-09-16 RX ADMIN — Medication 10 ML: at 09:15

## 2019-09-16 ASSESSMENT — PAIN SCALES - GENERAL: PAINLEVEL_OUTOF10: 10

## 2019-09-16 ASSESSMENT — PAIN DESCRIPTION - LOCATION: LOCATION: OTHER (COMMENT)

## 2019-09-16 ASSESSMENT — PAIN DESCRIPTION - PAIN TYPE: TYPE: ACUTE PAIN

## 2019-09-16 NOTE — PROGRESS NOTES
Patient assessed for the following post IV hydration:    Dizziness   No  Lightheadedness  No      Acute nausea/vomiting No  Headache   No  Chest pain/pressure  No  Rash/itching   No  Shortness of breath  No    Patient tolerated IV hydration without any complications. Last vital signs:   BP (!) 104/56   Pulse 79   Temp 97.5 °F (36.4 °C) (Oral)   Resp 18   Ht 5' 11\" (1.803 m)   Wt 209 lb (94.8 kg)   SpO2 98%   BMI 29.15 kg/m²     Patient instructed if experience any of the above symptoms following today's infusion, he is to notify MD immediately or go to the emergency department. Patient instructed to call their physician at [facility] to assess for needs related to groin pain that radiates up into upper back/chest intermittently. Discharge instructions given to patient. Verbalizes understanding. Ambulated off unit per self with belongings.

## 2019-09-16 NOTE — PLAN OF CARE
Problem: Pain:  Description  Pain management should include both nonpharmacologic and pharmacologic interventions. Goal: Pain level will decrease  Description  Pain level will decrease  Outcome: Met This Shift  Goal: Control of acute pain  Description  Control of acute pain  Outcome: Met This Shift  Note:   Patient rating pain a 10 out of 10 using CARLOS scale, Pain located in  groin radiating towards upper back/chest.   Goal: Control of chronic pain  Description  Control of chronic pain  Outcome: Met This Shift  Intervention: Assess barriers to pain control  Description  Assess barriers to pain control - REMINDER(s):  Addiction concerns. Healthcare practices, cultural.  Pain medication concerns. Past pain experiences. Note:   Patient encouraged to take prescribed pain medications and call Physician if pain is not controlled. Problem: Discharge Planning  Goal: Knowledge of discharge instructions  Description  Knowledge of discharge instructions     Outcome: Met This Shift  Note:   Verbalized understanding of discharge instructions, follow-up appointments, and when to call the physician. Intervention: Interaction with patient/family and care team  Note:   Discuss understanding of discharge instructions,follow-up appointments, and when to call the physician. Problem: Intellectual/Education/Knowledge Deficit  Goal: Teaching initiated upon admission  Outcome: Met This Shift  Note:   Verbalizes understanding to signs and symptoms of dehydration and when to call the Physician. Intervention: Verbal/written education provided  Note:   Patient received 500 ml IV infusion over 1 hour. Patient drank 100 ml of water while in OP oncology. Encouraged patient to drink 48 to 64 ounces of fluids everyday. Problem: Falls - Risk of:  Goal: Will remain free from falls  Description  Will remain free from falls  Outcome: Met This Shift  Note:   Free from falls while in [facility] Oncology.    Intervention: Assess risk factors for falls  Description  Assess risk factors for falls  Note:   Discussed the need to use the call light for assistance when getting up to ambulate. Care plan reviewed with patient. Patient verbalize understanding of the plan of care and contribute to goal setting.

## 2019-09-19 ENCOUNTER — HOSPITAL ENCOUNTER (OUTPATIENT)
Dept: INFUSION THERAPY | Age: 54
Discharge: HOME | End: 2019-09-19
Payer: COMMERCIAL

## 2019-09-19 VITALS
WEIGHT: 205.4 LBS | DIASTOLIC BLOOD PRESSURE: 72 MMHG | BODY MASS INDEX: 28.76 KG/M2 | SYSTOLIC BLOOD PRESSURE: 118 MMHG | OXYGEN SATURATION: 95 % | HEART RATE: 66 BPM | RESPIRATION RATE: 18 BRPM | TEMPERATURE: 98.1 F | HEIGHT: 71 IN

## 2019-09-19 DIAGNOSIS — C95.01 ACUTE LEUKEMIA IN REMISSION (HCC): Primary | ICD-10-CM

## 2019-09-19 LAB
BASOPHILS # BLD: 0 %
BASOPHILS ABSOLUTE: 0 THOU/MM3 (ref 0–0.1)
BLASTS: 2 %
DIFFERENTIAL, MANUAL: NORMAL
EOSINOPHIL # BLD: 0 %
EOSINOPHILS ABSOLUTE: 0 THOU/MM3 (ref 0–0.4)
ERYTHROCYTE [DISTWIDTH] IN BLOOD BY AUTOMATED COUNT: 15.9 % (ref 11.5–14.5)
ERYTHROCYTE [DISTWIDTH] IN BLOOD BY AUTOMATED COUNT: 52 FL (ref 35–45)
HCT VFR BLD CALC: 23.9 % (ref 42–52)
HEMOGLOBIN: 7.8 GM/DL (ref 14–18)
LYMPHOCYTES # BLD: 3 %
LYMPHOCYTES ABSOLUTE: 1.6 THOU/MM3 (ref 1–4.8)
MCH RBC QN AUTO: 29.3 PG (ref 26–33)
MCHC RBC AUTO-ENTMCNC: 32.6 GM/DL (ref 32.2–35.5)
MCV RBC AUTO: 89.8 FL (ref 80–94)
METAMYELOCYTES: 19 %
MONOCYTES # BLD: 8 %
MONOCYTES ABSOLUTE: 4.2 THOU/MM3 (ref 0.4–1.3)
MYELOCYTES: 5 %
NUCLEATED RED BLOOD CELLS: 0 /100 WBC
PATHOLOGIST REVIEW: ABNORMAL
PLATELET # BLD: 112 THOU/MM3 (ref 130–400)
PMV BLD AUTO: 9.3 FL (ref 9.4–12.4)
PROMYELOCYTES: 1 %
RBC # BLD: 2.66 MILL/MM3 (ref 4.7–6.1)
SEG NEUTROPHILS: 62 %
SEGMENTED NEUTROPHILS ABSOLUTE COUNT: 32.9 THOU/MM3 (ref 1.8–7.7)
WBC # BLD: 53 THOU/MM3 (ref 4.8–10.8)

## 2019-09-19 PROCEDURE — 36415 COLL VENOUS BLD VENIPUNCTURE: CPT

## 2019-09-19 PROCEDURE — 85025 COMPLETE CBC W/AUTO DIFF WBC: CPT

## 2019-09-19 PROCEDURE — 36592 COLLECT BLOOD FROM PICC: CPT

## 2019-09-19 PROCEDURE — 99213 OFFICE O/P EST LOW 20 MIN: CPT

## 2019-09-19 PROCEDURE — 2580000003 HC RX 258: Performed by: INTERNAL MEDICINE

## 2019-09-19 RX ORDER — SODIUM CHLORIDE 0.9 % (FLUSH) 0.9 %
20 SYRINGE (ML) INJECTION PRN
Status: DISCONTINUED | OUTPATIENT
Start: 2019-09-19 | End: 2019-09-20 | Stop reason: HOSPADM

## 2019-09-19 RX ORDER — SODIUM CHLORIDE 0.9 % (FLUSH) 0.9 %
10 SYRINGE (ML) INJECTION PRN
Status: CANCELLED | OUTPATIENT
Start: 2019-09-19

## 2019-09-19 RX ORDER — HEPARIN SODIUM (PORCINE) LOCK FLUSH IV SOLN 100 UNIT/ML 100 UNIT/ML
500 SOLUTION INTRAVENOUS PRN
Status: CANCELLED | OUTPATIENT
Start: 2019-09-19

## 2019-09-19 RX ORDER — 0.9 % SODIUM CHLORIDE 0.9 %
250 INTRAVENOUS SOLUTION INTRAVENOUS ONCE
Status: CANCELLED
Start: 2019-09-19

## 2019-09-19 RX ORDER — SODIUM CHLORIDE 9 MG/ML
INJECTION, SOLUTION INTRAVENOUS CONTINUOUS
Status: CANCELLED | OUTPATIENT
Start: 2019-09-19

## 2019-09-19 RX ORDER — 0.9 % SODIUM CHLORIDE 0.9 %
250 INTRAVENOUS SOLUTION INTRAVENOUS ONCE
Status: CANCELLED | OUTPATIENT
Start: 2019-09-19

## 2019-09-19 RX ORDER — DIPHENHYDRAMINE HYDROCHLORIDE 50 MG/ML
50 INJECTION INTRAMUSCULAR; INTRAVENOUS ONCE
Status: CANCELLED | OUTPATIENT
Start: 2019-09-19

## 2019-09-19 RX ORDER — SODIUM CHLORIDE 0.9 % (FLUSH) 0.9 %
20 SYRINGE (ML) INJECTION PRN
Status: CANCELLED | OUTPATIENT
Start: 2019-09-19

## 2019-09-19 RX ORDER — CLONIDINE HYDROCHLORIDE 0.1 MG/1
0.1 TABLET ORAL 3 TIMES DAILY
Qty: 270 TABLET | Refills: 1 | Status: ON HOLD | OUTPATIENT
Start: 2019-09-19 | End: 2020-07-03

## 2019-09-19 RX ORDER — SODIUM CHLORIDE 0.9 % (FLUSH) 0.9 %
10 SYRINGE (ML) INJECTION PRN
Status: DISCONTINUED | OUTPATIENT
Start: 2019-09-19 | End: 2019-09-20 | Stop reason: HOSPADM

## 2019-09-19 RX ORDER — METHYLPREDNISOLONE SODIUM SUCCINATE 125 MG/2ML
125 INJECTION, POWDER, LYOPHILIZED, FOR SOLUTION INTRAMUSCULAR; INTRAVENOUS ONCE
Status: CANCELLED | OUTPATIENT
Start: 2019-09-19

## 2019-09-19 RX ADMIN — Medication 20 ML: at 09:16

## 2019-09-19 RX ADMIN — Medication 10 ML: at 09:30

## 2019-09-19 RX ADMIN — Medication 10 ML: at 09:15

## 2019-09-19 ASSESSMENT — PAIN DESCRIPTION - FREQUENCY: FREQUENCY: CONTINUOUS

## 2019-09-19 ASSESSMENT — PAIN DESCRIPTION - PAIN TYPE: TYPE: ACUTE PAIN

## 2019-09-19 ASSESSMENT — PAIN SCALES - GENERAL: PAINLEVEL_OUTOF10: 4

## 2019-09-19 ASSESSMENT — PAIN DESCRIPTION - ONSET: ONSET: ON-GOING

## 2019-09-19 ASSESSMENT — PAIN DESCRIPTION - DESCRIPTORS: DESCRIPTORS: PRESSURE

## 2019-09-19 ASSESSMENT — PAIN DESCRIPTION - LOCATION: LOCATION: OTHER (COMMENT)

## 2019-09-19 NOTE — PROGRESS NOTES
Patient tolerated  Lab drawn and picc dressing change  without any complications. Discharge instructions given to patient-verbalizes understanding. Ambulated off unit per self with belongings.

## 2019-09-19 NOTE — PROGRESS NOTES
Patient was notified of final lab results and hgb 7.8 so informed needs to come in tomorrow for blood transfusion- voiced understanding.

## 2019-09-19 NOTE — PLAN OF CARE
Problem: Pain:  Description  Pain management should include both nonpharmacologic and pharmacologic interventions. Goal: Control of acute pain  Description  Control of acute pain  Outcome: Met This Shift  Note:   Patient rating pain a 4 out of 10 using CARLOS scale, Pain located in  groin area radiates to spine. Intervention: Promote participation in pain management plan  Description  Promote participation in pain management plan - REMINDER(s):  Involve patient/family in pain management plan. Update patient/family of any treatment changes. Note:   Patient encouraged to take prescribed pain medications and call Physician if pain is not controlled. Problem: Discharge Planning  Goal: Knowledge of discharge instructions  Description  Knowledge of discharge instructions     Outcome: Met This Shift  Note:   Verbalized understanding of discharge instructions, follow-up appointments, and when to call the physician. Intervention: Interaction with patient/family and care team  Note:   Discuss understanding of discharge instructions,follow-up appointments, and when to call the physician. Problem: Intellectual/Education/Knowledge Deficit  Goal: Teaching initiated upon admission  Outcome: Met This Shift  Note:   Aware of lab results- call MD if develop any problems- appointment [facility] monday  Intervention: Verbal/written education provided  Note:   Review lab results     Problem: Falls - Risk of:  Goal: Will remain free from falls  Description  Will remain free from falls  Outcome: Met This Shift  Note:   No falls occurred with visit today. Intervention: Assess risk factors for falls  Description  Assess risk factors for falls  Note:   Verbalized understanding of fall prevention to ask for assistance with ambulation. Call light within reach.       Problem: Infection - Central Venous Catheter-Associated Bloodstream Infection:  Goal: Will show no infection signs and symptoms  Description  Will show no infection signs and symptoms  Outcome: Met This Shift  Note:   PICC site with no redness,warmth or drainage. Patient verbalizes signs/symptoms of port infection and when to notify the physician. Intervention: Infection risk assessment  Description  Infection risk assessment  Note:   Instructed to monitor for signs/symptoms of infection at picc and call MD if problems develop. Care plan reviewed with patient . Patient  verbalize understanding of the plan of care and contribute to goal setting.

## 2019-09-20 ENCOUNTER — HOSPITAL ENCOUNTER (OUTPATIENT)
Dept: INFUSION THERAPY | Age: 54
Discharge: HOME | End: 2019-09-20
Payer: COMMERCIAL

## 2019-09-20 VITALS
BODY MASS INDEX: 28.7 KG/M2 | OXYGEN SATURATION: 100 % | WEIGHT: 205 LBS | RESPIRATION RATE: 16 BRPM | SYSTOLIC BLOOD PRESSURE: 129 MMHG | HEIGHT: 71 IN | DIASTOLIC BLOOD PRESSURE: 80 MMHG | TEMPERATURE: 98.5 F | HEART RATE: 77 BPM

## 2019-09-20 DIAGNOSIS — C95.01 ACUTE LEUKEMIA IN REMISSION (HCC): Primary | ICD-10-CM

## 2019-09-20 LAB
ABO: NORMAL
ANISOCYTOSIS: PRESENT
ANTIBODY SCREEN: NORMAL
BASOPHILIA: ABNORMAL
BASOPHILS # BLD: 0 %
BASOPHILS ABSOLUTE: 0.1 THOU/MM3 (ref 0–0.1)
BLASTS: 1 %
EOSINOPHIL # BLD: 0 %
EOSINOPHILS ABSOLUTE: 0 THOU/MM3 (ref 0–0.4)
HCT VFR BLD CALC: 21.7 % (ref 42–52)
HEMOGLOBIN: 7.4 GM/DL (ref 14–18)
IMMATURE GRANS (ABS): 8.12 THOU/MM3 (ref 0–0.07)
IMMATURE GRANULOCYTES: 26 %
LYMPHOCYTES # BLD: 6 %
LYMPHOCYTES ABSOLUTE: 2.2 THOU/MM3 (ref 1–4.8)
MCH RBC QN AUTO: 29.6 PG (ref 27–31)
MCHC RBC AUTO-ENTMCNC: 34.2 GM/DL (ref 33–37)
MCV RBC AUTO: 87 FL (ref 80–94)
MONOCYTES # BLD: 10 %
MONOCYTES ABSOLUTE: 3.2 THOU/MM3 (ref 0.4–1.3)
NUCLEATED RED BLOOD CELLS: 1 /100 WBC
PDW BLD-RTO: 12.5 % (ref 11.5–14.5)
PLATELET # BLD: 100 THOU/MM3 (ref 130–400)
PLATELET ESTIMATE: ABNORMAL
PMV BLD AUTO: 6.4 FL (ref 7.4–10.4)
POIKILOCYTES: ABNORMAL
RBC # BLD: 2.5 MILL/MM3 (ref 4.7–6.1)
RH FACTOR: NORMAL
ROULEAUX: SLIGHT
SCAN OF BLOOD SMEAR: NORMAL
SEG NEUTROPHILS: 58 %
SEGMENTED NEUTROPHILS ABSOLUTE COUNT: 18.2 THOU/MM3 (ref 1.8–7.7)
WBC # BLD: 33.8 THOU/MM3 (ref 4.8–10.8)

## 2019-09-20 PROCEDURE — 86901 BLOOD TYPING SEROLOGIC RH(D): CPT

## 2019-09-20 PROCEDURE — P9040 RBC LEUKOREDUCED IRRADIATED: HCPCS

## 2019-09-20 PROCEDURE — 2580000003 HC RX 258: Performed by: NURSE PRACTITIONER

## 2019-09-20 PROCEDURE — 85025 COMPLETE CBC W/AUTO DIFF WBC: CPT

## 2019-09-20 PROCEDURE — 36430 TRANSFUSION BLD/BLD COMPNT: CPT

## 2019-09-20 PROCEDURE — 86850 RBC ANTIBODY SCREEN: CPT

## 2019-09-20 PROCEDURE — 86900 BLOOD TYPING SEROLOGIC ABO: CPT

## 2019-09-20 PROCEDURE — 36592 COLLECT BLOOD FROM PICC: CPT

## 2019-09-20 PROCEDURE — 2580000003 HC RX 258: Performed by: INTERNAL MEDICINE

## 2019-09-20 PROCEDURE — P9016 RBC LEUKOCYTES REDUCED: HCPCS

## 2019-09-20 PROCEDURE — 36415 COLL VENOUS BLD VENIPUNCTURE: CPT

## 2019-09-20 PROCEDURE — 86923 COMPATIBILITY TEST ELECTRIC: CPT

## 2019-09-20 RX ORDER — FUROSEMIDE 10 MG/ML
20 INJECTION INTRAMUSCULAR; INTRAVENOUS ONCE
Status: CANCELLED | OUTPATIENT
Start: 2019-09-20

## 2019-09-20 RX ORDER — 0.9 % SODIUM CHLORIDE 0.9 %
10 VIAL (ML) INJECTION ONCE
Status: DISCONTINUED | OUTPATIENT
Start: 2019-09-20 | End: 2019-09-21 | Stop reason: HOSPADM

## 2019-09-20 RX ORDER — SODIUM CHLORIDE 0.9 % (FLUSH) 0.9 %
10 SYRINGE (ML) INJECTION PRN
Status: CANCELLED | OUTPATIENT
Start: 2019-09-20

## 2019-09-20 RX ORDER — 0.9 % SODIUM CHLORIDE 0.9 %
10 VIAL (ML) INJECTION ONCE
Status: CANCELLED | OUTPATIENT
Start: 2019-09-20

## 2019-09-20 RX ORDER — METHYLPREDNISOLONE SODIUM SUCCINATE 125 MG/2ML
125 INJECTION, POWDER, LYOPHILIZED, FOR SOLUTION INTRAMUSCULAR; INTRAVENOUS ONCE
Status: CANCELLED | OUTPATIENT
Start: 2019-09-20

## 2019-09-20 RX ORDER — SODIUM CHLORIDE 9 MG/ML
INJECTION, SOLUTION INTRAVENOUS CONTINUOUS
Status: CANCELLED | OUTPATIENT
Start: 2019-09-20

## 2019-09-20 RX ORDER — 0.9 % SODIUM CHLORIDE 0.9 %
250 INTRAVENOUS SOLUTION INTRAVENOUS ONCE
Status: DISCONTINUED | OUTPATIENT
Start: 2019-09-20 | End: 2019-09-21 | Stop reason: HOSPADM

## 2019-09-20 RX ORDER — ACETAMINOPHEN 325 MG/1
650 TABLET ORAL ONCE
Status: CANCELLED | OUTPATIENT
Start: 2019-09-20

## 2019-09-20 RX ORDER — HEPARIN SODIUM (PORCINE) LOCK FLUSH IV SOLN 100 UNIT/ML 100 UNIT/ML
500 SOLUTION INTRAVENOUS PRN
Status: CANCELLED | OUTPATIENT
Start: 2019-09-20

## 2019-09-20 RX ORDER — SODIUM CHLORIDE 0.9 % (FLUSH) 0.9 %
20 SYRINGE (ML) INJECTION PRN
Status: CANCELLED | OUTPATIENT
Start: 2019-09-20

## 2019-09-20 RX ORDER — DIPHENHYDRAMINE HCL 25 MG
25 TABLET ORAL ONCE
Status: CANCELLED | OUTPATIENT
Start: 2019-09-20

## 2019-09-20 RX ORDER — SODIUM CHLORIDE 0.9 % (FLUSH) 0.9 %
20 SYRINGE (ML) INJECTION PRN
Status: DISCONTINUED | OUTPATIENT
Start: 2019-09-20 | End: 2019-09-21 | Stop reason: HOSPADM

## 2019-09-20 RX ORDER — 0.9 % SODIUM CHLORIDE 0.9 %
250 INTRAVENOUS SOLUTION INTRAVENOUS ONCE
Status: CANCELLED | OUTPATIENT
Start: 2019-09-20

## 2019-09-20 RX ORDER — 0.9 % SODIUM CHLORIDE 0.9 %
250 INTRAVENOUS SOLUTION INTRAVENOUS ONCE
Status: CANCELLED
Start: 2019-09-20

## 2019-09-20 RX ORDER — METHYLPREDNISOLONE SODIUM SUCCINATE 125 MG/2ML
125 INJECTION, POWDER, LYOPHILIZED, FOR SOLUTION INTRAMUSCULAR; INTRAVENOUS ONCE
Status: DISCONTINUED | OUTPATIENT
Start: 2019-09-20 | End: 2019-09-21 | Stop reason: HOSPADM

## 2019-09-20 RX ORDER — DIPHENHYDRAMINE HYDROCHLORIDE 50 MG/ML
50 INJECTION INTRAMUSCULAR; INTRAVENOUS ONCE
Status: CANCELLED | OUTPATIENT
Start: 2019-09-20

## 2019-09-20 RX ORDER — DIPHENHYDRAMINE HYDROCHLORIDE 50 MG/ML
50 INJECTION INTRAMUSCULAR; INTRAVENOUS ONCE
Status: DISCONTINUED | OUTPATIENT
Start: 2019-09-20 | End: 2019-09-21 | Stop reason: HOSPADM

## 2019-09-20 RX ORDER — SODIUM CHLORIDE 9 MG/ML
INJECTION, SOLUTION INTRAVENOUS CONTINUOUS
Status: DISCONTINUED | OUTPATIENT
Start: 2019-09-20 | End: 2019-09-21 | Stop reason: HOSPADM

## 2019-09-20 RX ORDER — SODIUM CHLORIDE 0.9 % (FLUSH) 0.9 %
10 SYRINGE (ML) INJECTION PRN
Status: DISCONTINUED | OUTPATIENT
Start: 2019-09-20 | End: 2019-09-21 | Stop reason: HOSPADM

## 2019-09-20 RX ADMIN — Medication 10 ML: at 09:20

## 2019-09-20 RX ADMIN — Medication 10 ML: at 10:30

## 2019-09-20 RX ADMIN — SODIUM CHLORIDE: 9 INJECTION, SOLUTION INTRAVENOUS at 11:30

## 2019-09-20 RX ADMIN — Medication 20 ML: at 14:05

## 2019-09-20 RX ADMIN — Medication 20 ML: at 09:21

## 2019-09-20 NOTE — PROGRESS NOTES
Patient tolerated transfusion of 1 unit of packed red blood cells without any complications. Patient kept for 20 minutes observation post transfusion. Denies dizziness, lightheadedness, acute nausea or vomiting, headache, chest pain/pressure, rash/itching, or an increase in SOB. Last vital signs:   /80   Pulse 77   Temp 98.5 °F (36.9 °C) (Oral)   Resp 16 Comment: clear  Ht 5' 11\" (1.803 m)   Wt 205 lb (93 kg)   SpO2 100%   BMI 28.59 kg/m²     Patient instructed if they experience any of the above symptoms following today's transfusion, he is to notify the physician immediately or go to the emergency department. Discharge instructions given to patient. Verbalizes understanding. Ambulated off unit per self, with belongings.

## 2019-09-20 NOTE — PLAN OF CARE
Problem: Discharge Planning  Goal: Knowledge of discharge instructions  Description  Knowledge of discharge instructions     Outcome: Met This Shift  Note:   Verbalized understanding of discharge instructions, follow-up appointments, and when to call the physician. Intervention: Interaction with patient/family and care team  Note:   Discuss understanding of discharge instructions,follow-up appointments, and when to call the physician. Problem: Intellectual/Education/Knowledge Deficit  Goal: Teaching initiated upon admission  Outcome: Met This Shift  Note:   Patient verbalize understanding to  verbal and written information given  on packed red blood cell transfusion,procedure ,and possible reaction. Instructed to call MD if develop any complications once discharged. Intervention: Verbal/written education provided  Note:   Patient educated blood product transfusion protocol:    Patient receiving 1 unit of packed red blood cells:      - Blood product transfusion information sheet given: questions answered and consent signed  - Take vital signs/ monitor lungs sound prior to transfusion  - Monitor patient for 15 minutes after transfusion started  - Take vital signs / monitor lungs sound in 15 minutes and post transfusion  - Assess IV site   - Monitor patient closely for potential transfusion reaction    Call MD if develop complications once discharged. Problem: Falls - Risk of:  Goal: Will remain free from falls  Description  Will remain free from falls  Outcome: Met This Shift  Note:   Free from falls while in [facility] Oncology. Intervention: Assess risk factors for falls  Description  Assess risk factors for falls  Note:   Discussed the need to use the call light for assistance when getting up to ambulate.       Problem: Infection - Central Venous Catheter-Associated Bloodstream Infection:  Goal: Will show no infection signs and symptoms  Description  Will show no infection signs and symptoms  Outcome: Met This Shift  Note:   PICC site with no redness,warmth or drainage. Patient verbalizes signs/symptoms of port infection and when to notify the physician. Intervention: Central line needs assessment  Description  Central line needs assessment  Note:   Discuss port maintenance, infection prevention, signs and when to call Dr Tosin Harkins reviewed with patient. Patient verbalize understanding of the plan of care and contribute to goal setting.

## 2019-12-26 PROBLEM — G47.00 INSOMNIA: Status: ACTIVE | Noted: 2019-08-07

## 2019-12-26 PROBLEM — I48.0 PAROXYSMAL ATRIAL FIBRILLATION (HCC): Status: ACTIVE | Noted: 2019-07-02

## 2019-12-26 PROBLEM — E66.9 OBESITY (BMI 30.0-34.9): Status: ACTIVE | Noted: 2019-06-19

## 2020-01-08 ENCOUNTER — HOSPITAL ENCOUNTER (OUTPATIENT)
Age: 55
Setting detail: SPECIMEN
Discharge: HOME | End: 2020-01-08
Payer: COMMERCIAL

## 2020-01-08 LAB
ALBUMIN SERPL-MCNC: 3.9 G/DL (ref 3.5–5.1)
ALP BLD-CCNC: 96 U/L (ref 38–126)
ALT SERPL-CCNC: 8 U/L (ref 11–66)
ANION GAP SERPL CALCULATED.3IONS-SCNC: 13 MEQ/L (ref 8–16)
ANISOCYTOSIS: PRESENT
AST SERPL-CCNC: 21 U/L (ref 5–40)
BASOPHILS # BLD: 1.6 %
BASOPHILS ABSOLUTE: 0.1 THOU/MM3 (ref 0–0.1)
BILIRUB SERPL-MCNC: 0.7 MG/DL (ref 0.3–1.2)
BILIRUBIN DIRECT: < 0.2 MG/DL (ref 0–0.3)
BUN BLDV-MCNC: 17 MG/DL (ref 7–22)
CALCIUM SERPL-MCNC: 9 MG/DL (ref 8.5–10.5)
CHLORIDE BLD-SCNC: 103 MEQ/L (ref 98–111)
CO2: 22 MEQ/L (ref 23–33)
CREAT SERPL-MCNC: 1.3 MG/DL (ref 0.4–1.2)
EOSINOPHIL # BLD: 3.9 %
EOSINOPHILS ABSOLUTE: 0.2 THOU/MM3 (ref 0–0.4)
ERYTHROCYTE [DISTWIDTH] IN BLOOD BY AUTOMATED COUNT: 24.1 % (ref 11.5–14.5)
ERYTHROCYTE [DISTWIDTH] IN BLOOD BY AUTOMATED COUNT: 77.9 FL (ref 35–45)
GFR SERPL CREATININE-BSD FRML MDRD: 57 ML/MIN/1.73M2
GLUCOSE BLD-MCNC: 103 MG/DL (ref 70–108)
HCT VFR BLD CALC: 30.2 % (ref 42–52)
HEMOGLOBIN: 9.5 GM/DL (ref 14–18)
IMMATURE GRANS (ABS): 0.02 THOU/MM3 (ref 0–0.07)
IMMATURE GRANULOCYTES: 0.5 %
LYMPHOCYTES # BLD: 18.3 %
LYMPHOCYTES ABSOLUTE: 0.8 THOU/MM3 (ref 1–4.8)
MCH RBC QN AUTO: 29.4 PG (ref 26–33)
MCHC RBC AUTO-ENTMCNC: 31.5 GM/DL (ref 32.2–35.5)
MCV RBC AUTO: 93.5 FL (ref 80–94)
MONOCYTES # BLD: 4.8 %
MONOCYTES ABSOLUTE: 0.2 THOU/MM3 (ref 0.4–1.3)
NUCLEATED RED BLOOD CELLS: 0 /100 WBC
PLATELET # BLD: 61 THOU/MM3 (ref 130–400)
PLATELET ESTIMATE: ABNORMAL
PMV BLD AUTO: 10.4 FL (ref 9.4–12.4)
POIKILOCYTES: ABNORMAL
POTASSIUM SERPL-SCNC: 3.9 MEQ/L (ref 3.5–5.2)
RBC # BLD: 3.23 MILL/MM3 (ref 4.7–6.1)
SCAN OF BLOOD SMEAR: NORMAL
SEG NEUTROPHILS: 70.9 %
SEGMENTED NEUTROPHILS ABSOLUTE COUNT: 3.1 THOU/MM3 (ref 1.8–7.7)
SODIUM BLD-SCNC: 138 MEQ/L (ref 135–145)
TOTAL PROTEIN: 6.4 G/DL (ref 6.1–8)
WBC # BLD: 4.4 THOU/MM3 (ref 4.8–10.8)

## 2020-01-08 PROCEDURE — 80053 COMPREHEN METABOLIC PANEL: CPT

## 2020-01-08 PROCEDURE — 82248 BILIRUBIN DIRECT: CPT

## 2020-01-08 PROCEDURE — 36415 COLL VENOUS BLD VENIPUNCTURE: CPT

## 2020-01-08 PROCEDURE — 85025 COMPLETE CBC W/AUTO DIFF WBC: CPT

## 2020-01-30 ENCOUNTER — HOSPITAL ENCOUNTER (OUTPATIENT)
Age: 55
Setting detail: SPECIMEN
Discharge: HOME | End: 2020-01-30
Payer: COMMERCIAL

## 2020-01-30 LAB
ANION GAP SERPL CALCULATED.3IONS-SCNC: 12 MEQ/L (ref 8–16)
ANISOCYTOSIS: PRESENT
BASOPHILS # BLD: 0.5 %
BASOPHILS ABSOLUTE: 0 THOU/MM3 (ref 0–0.1)
BUN BLDV-MCNC: 31 MG/DL (ref 7–22)
CHLORIDE BLD-SCNC: 99 MEQ/L (ref 98–111)
CO2: 25 MEQ/L (ref 23–33)
CREAT SERPL-MCNC: 1.2 MG/DL (ref 0.4–1.2)
EOSINOPHIL # BLD: 0.2 %
EOSINOPHILS ABSOLUTE: 0 THOU/MM3 (ref 0–0.4)
ERYTHROCYTE [DISTWIDTH] IN BLOOD BY AUTOMATED COUNT: 19.5 % (ref 11.5–14.5)
ERYTHROCYTE [DISTWIDTH] IN BLOOD BY AUTOMATED COUNT: 73.8 FL (ref 35–45)
GFR SERPL CREATININE-BSD FRML MDRD: 62 ML/MIN/1.73M2
HCT VFR BLD CALC: 33.9 % (ref 42–52)
HEMOGLOBIN: 10.7 GM/DL (ref 14–18)
IMMATURE GRANS (ABS): 0.11 THOU/MM3 (ref 0–0.07)
IMMATURE GRANULOCYTES: 1.8 %
LYMPHOCYTES # BLD: 11.5 %
LYMPHOCYTES ABSOLUTE: 0.7 THOU/MM3 (ref 1–4.8)
MCH RBC QN AUTO: 32.4 PG (ref 26–33)
MCHC RBC AUTO-ENTMCNC: 31.6 GM/DL (ref 32.2–35.5)
MCV RBC AUTO: 102.7 FL (ref 80–94)
MONOCYTES # BLD: 11.5 %
MONOCYTES ABSOLUTE: 0.7 THOU/MM3 (ref 0.4–1.3)
NUCLEATED RED BLOOD CELLS: 0 /100 WBC
PLATELET # BLD: 123 THOU/MM3 (ref 130–400)
PMV BLD AUTO: 9 FL (ref 9.4–12.4)
POTASSIUM SERPL-SCNC: 4.4 MEQ/L (ref 3.5–5.2)
RBC # BLD: 3.3 MILL/MM3 (ref 4.7–6.1)
SEG NEUTROPHILS: 74.5 %
SEGMENTED NEUTROPHILS ABSOLUTE COUNT: 4.6 THOU/MM3 (ref 1.8–7.7)
SODIUM BLD-SCNC: 136 MEQ/L (ref 135–145)
WBC # BLD: 6.2 THOU/MM3 (ref 4.8–10.8)

## 2020-01-30 PROCEDURE — 80051 ELECTROLYTE PANEL: CPT

## 2020-01-30 PROCEDURE — 84520 ASSAY OF UREA NITROGEN: CPT

## 2020-01-30 PROCEDURE — 82565 ASSAY OF CREATININE: CPT

## 2020-01-30 PROCEDURE — 85025 COMPLETE CBC W/AUTO DIFF WBC: CPT

## 2020-01-30 PROCEDURE — 36415 COLL VENOUS BLD VENIPUNCTURE: CPT

## 2020-03-24 ENCOUNTER — OFFICE VISIT (OUTPATIENT)
Dept: ONCOLOGY | Age: 55
End: 2020-03-24
Payer: COMMERCIAL

## 2020-03-24 ENCOUNTER — HOSPITAL ENCOUNTER (OUTPATIENT)
Dept: INFUSION THERAPY | Age: 55
Discharge: HOME | End: 2020-03-24
Payer: COMMERCIAL

## 2020-03-24 ENCOUNTER — HOSPITAL ENCOUNTER (OUTPATIENT)
Age: 55
Discharge: HOME | End: 2020-03-24
Payer: COMMERCIAL

## 2020-03-24 VITALS
DIASTOLIC BLOOD PRESSURE: 70 MMHG | HEART RATE: 64 BPM | SYSTOLIC BLOOD PRESSURE: 130 MMHG | HEIGHT: 71 IN | RESPIRATION RATE: 18 BRPM | TEMPERATURE: 98.8 F | WEIGHT: 205.4 LBS | BODY MASS INDEX: 28.76 KG/M2

## 2020-03-24 LAB
ANISOCYTOSIS: PRESENT
BASOPHILIA: ABNORMAL
BASOPHILS # BLD: 0.5 %
BASOPHILS ABSOLUTE: 0 THOU/MM3 (ref 0–0.1)
BUN BLDV-MCNC: 37 MG/DL (ref 7–22)
CHLORIDE, WHOLE BLOOD: 102 MEQ/L (ref 98–109)
CO2: 23 MEQ/L (ref 23–33)
CREATININE, WHOLE BLOOD: 2.6 MG/DL (ref 0.5–1.2)
EOSINOPHIL # BLD: 1.5 %
EOSINOPHILS ABSOLUTE: 0.1 THOU/MM3 (ref 0–0.4)
ERYTHROCYTE [DISTWIDTH] IN BLOOD BY AUTOMATED COUNT: 13.8 % (ref 11.5–14.5)
ERYTHROCYTE [DISTWIDTH] IN BLOOD BY AUTOMATED COUNT: 51 FL (ref 35–45)
GFR, ESTIMATED: 27 ML/MIN/1.73M2
GLUCOSE, WHOLE BLOOD: 125 MG/DL (ref 70–108)
HCT VFR BLD CALC: 32.5 % (ref 42–52)
HEMOGLOBIN: 10.5 GM/DL (ref 14–18)
IMMATURE GRANS (ABS): 0.05 THOU/MM3 (ref 0–0.07)
IMMATURE GRANULOCYTES: 1.3 %
IONIZED CALCIUM, WHOLE BLOOD: 1.11 MMOL/L (ref 1.12–1.32)
LYMPHOCYTES # BLD: 16.7 %
LYMPHOCYTES ABSOLUTE: 0.7 THOU/MM3 (ref 1–4.8)
MCH RBC QN AUTO: 32.3 PG (ref 26–33)
MCHC RBC AUTO-ENTMCNC: 32.3 GM/DL (ref 32.2–35.5)
MCV RBC AUTO: 100 FL (ref 80–94)
MONOCYTES # BLD: 3 %
MONOCYTES ABSOLUTE: 0.1 THOU/MM3 (ref 0.4–1.3)
NUCLEATED RED BLOOD CELLS: 0 /100 WBC
PLATELET # BLD: 83 THOU/MM3 (ref 130–400)
PLATELET ESTIMATE: ABNORMAL
PMV BLD AUTO: 9.8 FL (ref 9.4–12.4)
POTASSIUM, WHOLE BLOOD: 4.3 MEQ/L (ref 3.5–4.9)
RBC # BLD: 3.25 MILL/MM3 (ref 4.7–6.1)
SCAN OF BLOOD SMEAR: NORMAL
SEG NEUTROPHILS: 77 %
SEGMENTED NEUTROPHILS ABSOLUTE COUNT: 3.1 THOU/MM3 (ref 1.8–7.7)
SODIUM, WHOLE BLOOD: 137 MEQ/L (ref 138–146)
WBC # BLD: 4 THOU/MM3 (ref 4.8–10.8)

## 2020-03-24 PROCEDURE — 1036F TOBACCO NON-USER: CPT | Performed by: INTERNAL MEDICINE

## 2020-03-24 PROCEDURE — 99213 OFFICE O/P EST LOW 20 MIN: CPT | Performed by: INTERNAL MEDICINE

## 2020-03-24 PROCEDURE — 3017F COLORECTAL CA SCREEN DOC REV: CPT | Performed by: INTERNAL MEDICINE

## 2020-03-24 PROCEDURE — G8428 CUR MEDS NOT DOCUMENT: HCPCS | Performed by: INTERNAL MEDICINE

## 2020-03-24 PROCEDURE — 85025 COMPLETE CBC W/AUTO DIFF WBC: CPT

## 2020-03-24 PROCEDURE — 82374 ASSAY BLOOD CARBON DIOXIDE: CPT

## 2020-03-24 PROCEDURE — 80047 BASIC METABLC PNL IONIZED CA: CPT

## 2020-03-24 PROCEDURE — 84520 ASSAY OF UREA NITROGEN: CPT

## 2020-03-24 PROCEDURE — G8484 FLU IMMUNIZE NO ADMIN: HCPCS | Performed by: INTERNAL MEDICINE

## 2020-03-24 PROCEDURE — G8417 CALC BMI ABV UP PARAM F/U: HCPCS | Performed by: INTERNAL MEDICINE

## 2020-03-24 PROCEDURE — 36415 COLL VENOUS BLD VENIPUNCTURE: CPT

## 2020-03-24 RX ORDER — RUXOLITINIB 5 MG/1
5 TABLET ORAL 2 TIMES DAILY
Status: ON HOLD | COMMUNITY
End: 2020-07-03 | Stop reason: ALTCHOICE

## 2020-03-24 RX ORDER — LORAZEPAM 0.5 MG/1
0.5 TABLET ORAL EVERY 6 HOURS PRN
COMMUNITY
End: 2020-11-05 | Stop reason: ALTCHOICE

## 2020-03-24 RX ORDER — DAPSONE 100 MG/1
100 TABLET ORAL DAILY
COMMUNITY

## 2020-03-24 RX ORDER — PREDNISONE 1 MG/1
5 TABLET ORAL DAILY
COMMUNITY

## 2020-03-24 RX ORDER — MAGNESIUM OXIDE 400 MG/1
400 TABLET ORAL DAILY
COMMUNITY
End: 2020-04-24 | Stop reason: ALTCHOICE

## 2020-03-24 RX ORDER — ALLOPURINOL 300 MG/1
300 TABLET ORAL DAILY
COMMUNITY

## 2020-03-24 RX ORDER — VALGANCICLOVIR 450 MG/1
450 TABLET, FILM COATED ORAL DAILY
COMMUNITY
End: 2020-04-24 | Stop reason: ALTCHOICE

## 2020-04-01 ENCOUNTER — HOSPITAL ENCOUNTER (OUTPATIENT)
Age: 55
Discharge: HOME | End: 2020-04-01
Payer: COMMERCIAL

## 2020-04-01 LAB
ANION GAP SERPL CALCULATED.3IONS-SCNC: 15 MEQ/L (ref 8–16)
BASOPHILS # BLD: 0.2 %
BASOPHILS ABSOLUTE: 0 THOU/MM3 (ref 0–0.1)
BUN BLDV-MCNC: 43 MG/DL (ref 7–22)
CHLORIDE BLD-SCNC: 101 MEQ/L (ref 98–111)
CO2: 23 MEQ/L (ref 23–33)
CREAT SERPL-MCNC: 1.7 MG/DL (ref 0.4–1.2)
EOSINOPHIL # BLD: 0 %
EOSINOPHILS ABSOLUTE: 0 THOU/MM3 (ref 0–0.4)
ERYTHROCYTE [DISTWIDTH] IN BLOOD BY AUTOMATED COUNT: 14.4 % (ref 11.5–14.5)
ERYTHROCYTE [DISTWIDTH] IN BLOOD BY AUTOMATED COUNT: 53.8 FL (ref 35–45)
GFR SERPL CREATININE-BSD FRML MDRD: 42 ML/MIN/1.73M2
HCT VFR BLD CALC: 30 % (ref 42–52)
HEMOGLOBIN: 9.5 GM/DL (ref 14–18)
IMMATURE GRANS (ABS): 0.09 THOU/MM3 (ref 0–0.07)
IMMATURE GRANULOCYTES: 1.9 %
LYMPHOCYTES # BLD: 15.5 %
LYMPHOCYTES ABSOLUTE: 0.7 THOU/MM3 (ref 1–4.8)
MCH RBC QN AUTO: 32.6 PG (ref 26–33)
MCHC RBC AUTO-ENTMCNC: 31.7 GM/DL (ref 32.2–35.5)
MCV RBC AUTO: 103.1 FL (ref 80–94)
MONOCYTES # BLD: 7.3 %
MONOCYTES ABSOLUTE: 0.3 THOU/MM3 (ref 0.4–1.3)
NUCLEATED RED BLOOD CELLS: 1 /100 WBC
PLATELET # BLD: 79 THOU/MM3 (ref 130–400)
PMV BLD AUTO: 9.9 FL (ref 9.4–12.4)
POTASSIUM SERPL-SCNC: 5 MEQ/L (ref 3.5–5.2)
RBC # BLD: 2.91 MILL/MM3 (ref 4.7–6.1)
SEG NEUTROPHILS: 75.1 %
SEGMENTED NEUTROPHILS ABSOLUTE COUNT: 3.5 THOU/MM3 (ref 1.8–7.7)
SODIUM BLD-SCNC: 139 MEQ/L (ref 135–145)
WBC # BLD: 4.7 THOU/MM3 (ref 4.8–10.8)

## 2020-04-01 PROCEDURE — 82565 ASSAY OF CREATININE: CPT

## 2020-04-01 PROCEDURE — 36415 COLL VENOUS BLD VENIPUNCTURE: CPT

## 2020-04-01 PROCEDURE — 80051 ELECTROLYTE PANEL: CPT

## 2020-04-01 PROCEDURE — 84520 ASSAY OF UREA NITROGEN: CPT

## 2020-04-01 PROCEDURE — 85025 COMPLETE CBC W/AUTO DIFF WBC: CPT

## 2020-04-24 ENCOUNTER — HOSPITAL ENCOUNTER (OUTPATIENT)
Dept: INFUSION THERAPY | Age: 55
Discharge: HOME | End: 2020-04-24
Payer: COMMERCIAL

## 2020-04-24 VITALS
SYSTOLIC BLOOD PRESSURE: 160 MMHG | RESPIRATION RATE: 18 BRPM | OXYGEN SATURATION: 98 % | HEART RATE: 84 BPM | DIASTOLIC BLOOD PRESSURE: 84 MMHG | TEMPERATURE: 98.1 F

## 2020-04-24 DIAGNOSIS — C95.01 ACUTE LEUKEMIA IN REMISSION (HCC): Primary | ICD-10-CM

## 2020-04-24 LAB
ABO CHECK: NORMAL
ANISOCYTOSIS: PRESENT
ANTIBODY SCREEN: NORMAL
BASOPHILS # BLD: 0 %
BASOPHILS ABSOLUTE: 0 THOU/MM3 (ref 0–0.1)
DIFFERENTIAL, MANUAL: NORMAL
EOSINOPHIL # BLD: 2 %
EOSINOPHILS ABSOLUTE: 0.2 THOU/MM3 (ref 0–0.4)
ERYTHROCYTE [DISTWIDTH] IN BLOOD BY AUTOMATED COUNT: 18.5 % (ref 11.5–14.5)
ERYTHROCYTE [DISTWIDTH] IN BLOOD BY AUTOMATED COUNT: 73.6 FL (ref 35–45)
HCT VFR BLD CALC: 24.1 % (ref 42–52)
HEMOGLOBIN: 7.5 GM/DL (ref 14–18)
LYMPHOCYTES # BLD: 5 %
LYMPHOCYTES ABSOLUTE: 0.4 THOU/MM3 (ref 1–4.8)
MCH RBC QN AUTO: 34.2 PG (ref 26–33)
MCHC RBC AUTO-ENTMCNC: 31.1 GM/DL (ref 32.2–35.5)
MCV RBC AUTO: 110 FL (ref 80–94)
METAMYELOCYTES: 1 %
MONOCYTES # BLD: 9 %
MONOCYTES ABSOLUTE: 0.7 THOU/MM3 (ref 0.4–1.3)
MYELOCYTES: 1 %
NUCLEATED RED BLOOD CELLS: 3 /100 WBC
PLATELET # BLD: 63 THOU/MM3 (ref 130–400)
PLATELET ESTIMATE: ABNORMAL
PMV BLD AUTO: 9.8 FL (ref 9.4–12.4)
RBC # BLD: 2.19 MILL/MM3 (ref 4.7–6.1)
RH FACTOR: NORMAL
SCAN OF BLOOD SMEAR: NORMAL
SEG NEUTROPHILS: 82 %
SEGMENTED NEUTROPHILS ABSOLUTE COUNT: 6.2 THOU/MM3 (ref 1.8–7.7)
WBC # BLD: 7.6 THOU/MM3 (ref 4.8–10.8)

## 2020-04-24 PROCEDURE — 36415 COLL VENOUS BLD VENIPUNCTURE: CPT

## 2020-04-24 PROCEDURE — 86900 BLOOD TYPING SEROLOGIC ABO: CPT

## 2020-04-24 PROCEDURE — 86901 BLOOD TYPING SEROLOGIC RH(D): CPT

## 2020-04-24 PROCEDURE — 36430 TRANSFUSION BLD/BLD COMPNT: CPT

## 2020-04-24 PROCEDURE — 86850 RBC ANTIBODY SCREEN: CPT

## 2020-04-24 PROCEDURE — 85027 COMPLETE CBC AUTOMATED: CPT

## 2020-04-24 PROCEDURE — P9040 RBC LEUKOREDUCED IRRADIATED: HCPCS

## 2020-04-24 PROCEDURE — 2580000003 HC RX 258: Performed by: NURSE PRACTITIONER

## 2020-04-24 PROCEDURE — 86923 COMPATIBILITY TEST ELECTRIC: CPT

## 2020-04-24 RX ORDER — SODIUM CHLORIDE 0.9 % (FLUSH) 0.9 %
20 SYRINGE (ML) INJECTION PRN
Status: DISCONTINUED | OUTPATIENT
Start: 2020-04-24 | End: 2020-04-25 | Stop reason: HOSPADM

## 2020-04-24 RX ORDER — BUDESONIDE 3 MG/1
9 CAPSULE, COATED PELLETS ORAL EVERY MORNING
Status: ON HOLD | COMMUNITY
End: 2020-07-03 | Stop reason: ALTCHOICE

## 2020-04-24 RX ORDER — 0.9 % SODIUM CHLORIDE 0.9 %
250 INTRAVENOUS SOLUTION INTRAVENOUS ONCE
Status: CANCELLED | OUTPATIENT
Start: 2020-04-24

## 2020-04-24 RX ORDER — SODIUM CHLORIDE 0.9 % (FLUSH) 0.9 %
20 SYRINGE (ML) INJECTION PRN
Status: CANCELLED | OUTPATIENT
Start: 2020-04-24

## 2020-04-24 RX ORDER — ACETAMINOPHEN 325 MG/1
650 TABLET ORAL ONCE
Status: CANCELLED | OUTPATIENT
Start: 2020-04-24

## 2020-04-24 RX ORDER — SODIUM CHLORIDE 9 MG/ML
INJECTION, SOLUTION INTRAVENOUS CONTINUOUS
Status: CANCELLED | OUTPATIENT
Start: 2020-04-24

## 2020-04-24 RX ORDER — DIPHENHYDRAMINE HYDROCHLORIDE 50 MG/ML
50 INJECTION INTRAMUSCULAR; INTRAVENOUS ONCE
Status: CANCELLED | OUTPATIENT
Start: 2020-04-24

## 2020-04-24 RX ORDER — DIPHENHYDRAMINE HCL 25 MG
25 TABLET ORAL ONCE
Status: CANCELLED | OUTPATIENT
Start: 2020-04-24

## 2020-04-24 RX ORDER — ACYCLOVIR 800 MG/1
800 TABLET ORAL 2 TIMES DAILY
COMMUNITY

## 2020-04-24 RX ORDER — FLUCONAZOLE 200 MG/1
400 TABLET ORAL DAILY
COMMUNITY

## 2020-04-24 RX ORDER — FUROSEMIDE 10 MG/ML
20 INJECTION INTRAMUSCULAR; INTRAVENOUS ONCE
Status: CANCELLED | OUTPATIENT
Start: 2020-04-24

## 2020-04-24 RX ORDER — METHYLPREDNISOLONE SODIUM SUCCINATE 125 MG/2ML
125 INJECTION, POWDER, LYOPHILIZED, FOR SOLUTION INTRAMUSCULAR; INTRAVENOUS ONCE
Status: CANCELLED | OUTPATIENT
Start: 2020-04-24

## 2020-04-24 RX ORDER — 0.9 % SODIUM CHLORIDE 0.9 %
250 INTRAVENOUS SOLUTION INTRAVENOUS ONCE
Status: COMPLETED | OUTPATIENT
Start: 2020-04-24 | End: 2020-04-24

## 2020-04-24 RX ADMIN — SODIUM CHLORIDE 250 ML: 9 INJECTION, SOLUTION INTRAVENOUS at 12:18

## 2020-04-24 NOTE — PLAN OF CARE
Problem: Musculor/Skeletal Functional Status  Goal: Absence of falls  Outcome: Met This Shift  Note: No falls this admission   Intervention: Fall precautions  Note: Patient aware of fall precautions for here and at home -call light in reach while here       Problem: Intellectual/Education/Knowledge Deficit  Goal: Teaching initiated upon admission  Outcome: Met This Shift  Note: Patient educated blood product transfusion protocol:    Patient receiving one unit of packed red blood cells :      - Blood product transfusion information sheet given: questions answered and consent signed  - Take vital signs/ monitor lungs sound prior to transfusion  - Monitor patient for 15 minutes after transfusion started  - Take vital signs / monitor lungs sound in 15 minutes and post transfusion  - Assess IV site   - Monitor patient closely for potential transfusion reaction    Call MD if develop complications once discharged. Goal: Written Disposition Instruction form completed  Outcome: Met This Shift  Note: Discharge instructions given and reviewed with patient. All questions answered. Patient verbalized understanding   Intervention: Verbal/written education provided  Note: Discharge instruction sheets     Problem: Discharge Planning  Goal: Knowledge of discharge instructions  Description: Knowledge of discharge instructions     Outcome: Met This Shift  Note: Patient  able to teach back follow up appointments and when to call the doctor. Patient offers no questions at this time   Intervention: Interaction with patient/family and care team  Note: No concerns  Intervention: Discharge to appropriate level of care  Note: Discharge home   Care plan reviewed with patient and he verbalized understanding of the plan of care and contributed to goal setting.

## 2020-04-24 NOTE — PROGRESS NOTES
medications.   We contacted his hematologist at [address] [place] [address] they will instruct the patient about adjusting the dose

## 2020-04-24 NOTE — PROGRESS NOTES
Patient assessed for the following post blood transfusion :    Dizziness   No  Lightheadedness  No      Acute nausea/vomiting No  Headache   No  Chest pain/pressure  No  Rash/itching   No  Shortness of breath  No    Patient kept for 20 minutes observation post infusion. Patient tolerated blood transfusion  without any complications. Last vital signs:   BP (!) 160/84   Pulse 84   Temp 98.1 °F (36.7 °C) (Oral)   Resp 18   SpO2 98%       Patient instructed if experience any of the above symptoms following today's infusion,he is to notify MD immediately or go to the emergency department. Discharge instructions given to patient. Verbalizes understanding. Ambulated off unit per self  with belongings.

## 2020-05-04 RX ORDER — OMEPRAZOLE 40 MG/1
CAPSULE, DELAYED RELEASE ORAL
Qty: 90 CAPSULE | Refills: 3 | Status: SHIPPED | OUTPATIENT
Start: 2020-05-04

## 2020-05-05 ENCOUNTER — HOSPITAL ENCOUNTER (OUTPATIENT)
Age: 55
Discharge: HOME | End: 2020-05-05
Payer: COMMERCIAL

## 2020-05-05 LAB
ANION GAP SERPL CALCULATED.3IONS-SCNC: 14 MEQ/L (ref 8–16)
ANISOCYTOSIS: PRESENT
BASOPHILIA: ABNORMAL
BASOPHILS # BLD: 0.1 %
BASOPHILS ABSOLUTE: 0 THOU/MM3 (ref 0–0.1)
BUN BLDV-MCNC: 58 MG/DL (ref 7–22)
CHLORIDE BLD-SCNC: 100 MEQ/L (ref 98–111)
CO2: 21 MEQ/L (ref 23–33)
CREAT SERPL-MCNC: 1.8 MG/DL (ref 0.4–1.2)
EOSINOPHIL # BLD: 0.1 %
EOSINOPHILS ABSOLUTE: 0 THOU/MM3 (ref 0–0.4)
ERYTHROCYTE [DISTWIDTH] IN BLOOD BY AUTOMATED COUNT: 18.7 % (ref 11.5–14.5)
ERYTHROCYTE [DISTWIDTH] IN BLOOD BY AUTOMATED COUNT: 75.9 FL (ref 35–45)
GFR SERPL CREATININE-BSD FRML MDRD: 39 ML/MIN/1.73M2
HCT VFR BLD CALC: 27 % (ref 42–52)
HEMOGLOBIN: 8.7 GM/DL (ref 14–18)
IMMATURE GRANS (ABS): 0.17 THOU/MM3 (ref 0–0.07)
IMMATURE GRANULOCYTES: 2.4 %
LYMPHOCYTES # BLD: 9.1 %
LYMPHOCYTES ABSOLUTE: 0.6 THOU/MM3 (ref 1–4.8)
MACROCYTES: PRESENT
MCH RBC QN AUTO: 35.8 PG (ref 26–33)
MCHC RBC AUTO-ENTMCNC: 32.2 GM/DL (ref 32.2–35.5)
MCV RBC AUTO: 111.1 FL (ref 80–94)
MONOCYTES # BLD: 11.7 %
MONOCYTES ABSOLUTE: 0.8 THOU/MM3 (ref 0.4–1.3)
NUCLEATED RED BLOOD CELLS: 3 /100 WBC
PLATELET # BLD: 65 THOU/MM3 (ref 130–400)
PLATELET ESTIMATE: ABNORMAL
PMV BLD AUTO: 10.3 FL (ref 9.4–12.4)
POIKILOCYTES: ABNORMAL
POTASSIUM SERPL-SCNC: 4.5 MEQ/L (ref 3.5–5.2)
RBC # BLD: 2.43 MILL/MM3 (ref 4.7–6.1)
SCAN OF BLOOD SMEAR: NORMAL
SEG NEUTROPHILS: 76.6 %
SEGMENTED NEUTROPHILS ABSOLUTE COUNT: 5.4 THOU/MM3 (ref 1.8–7.7)
SODIUM BLD-SCNC: 135 MEQ/L (ref 135–145)
WBC # BLD: 7 THOU/MM3 (ref 4.8–10.8)

## 2020-05-05 PROCEDURE — 84520 ASSAY OF UREA NITROGEN: CPT

## 2020-05-05 PROCEDURE — 85025 COMPLETE CBC W/AUTO DIFF WBC: CPT

## 2020-05-05 PROCEDURE — 80051 ELECTROLYTE PANEL: CPT

## 2020-05-05 PROCEDURE — 82565 ASSAY OF CREATININE: CPT

## 2020-05-05 PROCEDURE — 36415 COLL VENOUS BLD VENIPUNCTURE: CPT

## 2020-05-14 ENCOUNTER — HOSPITAL ENCOUNTER (OUTPATIENT)
Age: 55
Discharge: HOME | End: 2020-05-14
Payer: COMMERCIAL

## 2020-05-14 LAB
ANION GAP SERPL CALCULATED.3IONS-SCNC: 14 MEQ/L (ref 8–16)
BUN BLDV-MCNC: 41 MG/DL (ref 7–22)
CHLORIDE BLD-SCNC: 101 MEQ/L (ref 98–111)
CO2: 20 MEQ/L (ref 23–33)
CREAT SERPL-MCNC: 1.4 MG/DL (ref 0.4–1.2)
GFR SERPL CREATININE-BSD FRML MDRD: 52 ML/MIN/1.73M2
POTASSIUM SERPL-SCNC: 4.4 MEQ/L (ref 3.5–5.2)
SCAN OF BLOOD SMEAR: NORMAL
SODIUM BLD-SCNC: 135 MEQ/L (ref 135–145)

## 2020-05-14 PROCEDURE — 80051 ELECTROLYTE PANEL: CPT

## 2020-05-14 PROCEDURE — 82565 ASSAY OF CREATININE: CPT

## 2020-05-14 PROCEDURE — 85025 COMPLETE CBC W/AUTO DIFF WBC: CPT

## 2020-05-14 PROCEDURE — 84520 ASSAY OF UREA NITROGEN: CPT

## 2020-05-14 PROCEDURE — 36415 COLL VENOUS BLD VENIPUNCTURE: CPT

## 2020-05-15 LAB
ANISOCYTOSIS: PRESENT
BASOPHILIA: ABNORMAL
BASOPHILS # BLD: 0 %
BASOPHILS ABSOLUTE: 0 THOU/MM3 (ref 0–0.1)
EOSINOPHIL # BLD: 0 %
EOSINOPHILS ABSOLUTE: 0 THOU/MM3 (ref 0–0.4)
ERYTHROCYTE [DISTWIDTH] IN BLOOD BY AUTOMATED COUNT: 18.9 % (ref 11.5–14.5)
ERYTHROCYTE [DISTWIDTH] IN BLOOD BY AUTOMATED COUNT: 77.8 FL (ref 35–45)
HCT VFR BLD CALC: 24.9 % (ref 42–52)
HEMOGLOBIN: 7.9 GM/DL (ref 14–18)
IMMATURE GRANS (ABS): 0.28 THOU/MM3 (ref 0–0.07)
IMMATURE GRANULOCYTES: 4.2 %
LYMPHOCYTES # BLD: 8.4 %
LYMPHOCYTES ABSOLUTE: 0.6 THOU/MM3 (ref 1–4.8)
MACROCYTES: PRESENT
MCH RBC QN AUTO: 35.7 PG (ref 26–33)
MCHC RBC AUTO-ENTMCNC: 31.7 GM/DL (ref 32.2–35.5)
MCV RBC AUTO: 112.7 FL (ref 80–94)
MONOCYTES # BLD: 12.1 %
MONOCYTES ABSOLUTE: 0.8 THOU/MM3 (ref 0.4–1.3)
NUCLEATED RED BLOOD CELLS: 4 /100 WBC
PATHOLOGIST REVIEW: ABNORMAL
PLATELET # BLD: 50 THOU/MM3 (ref 130–400)
PLATELET ESTIMATE: ABNORMAL
PMV BLD AUTO: 10.5 FL (ref 9.4–12.4)
POIKILOCYTES: ABNORMAL
RBC # BLD: 2.21 MILL/MM3 (ref 4.7–6.1)
SEG NEUTROPHILS: 75.3 %
SEGMENTED NEUTROPHILS ABSOLUTE COUNT: 5 THOU/MM3 (ref 1.8–7.7)
WBC # BLD: 6.7 THOU/MM3 (ref 4.8–10.8)

## 2020-06-11 ENCOUNTER — HOSPITAL ENCOUNTER (OUTPATIENT)
Age: 55
Discharge: HOME | End: 2020-06-11
Payer: COMMERCIAL

## 2020-06-11 LAB — SCAN OF BLOOD SMEAR: NORMAL

## 2020-06-11 PROCEDURE — 36415 COLL VENOUS BLD VENIPUNCTURE: CPT

## 2020-06-11 PROCEDURE — 85025 COMPLETE CBC W/AUTO DIFF WBC: CPT

## 2020-06-12 LAB
ANISOCYTOSIS: PRESENT
BASOPHILIA: ABNORMAL
BASOPHILS # BLD: 0.5 %
BASOPHILS ABSOLUTE: 0 THOU/MM3 (ref 0–0.1)
EOSINOPHIL # BLD: 0 %
EOSINOPHILS ABSOLUTE: 0 THOU/MM3 (ref 0–0.4)
ERYTHROCYTE [DISTWIDTH] IN BLOOD BY AUTOMATED COUNT: 19.1 % (ref 11.5–14.5)
ERYTHROCYTE [DISTWIDTH] IN BLOOD BY AUTOMATED COUNT: 78.8 FL (ref 35–45)
HCT VFR BLD CALC: 22.8 % (ref 42–52)
HEMOGLOBIN: 7.2 GM/DL (ref 14–18)
IMMATURE GRANS (ABS): 0.03 THOU/MM3 (ref 0–0.07)
IMMATURE GRANULOCYTES: 1.4 %
LYMPHOCYTES # BLD: 15.2 %
LYMPHOCYTES ABSOLUTE: 0.3 THOU/MM3 (ref 1–4.8)
MACROCYTES: PRESENT
MCH RBC QN AUTO: 37.1 PG (ref 26–33)
MCHC RBC AUTO-ENTMCNC: 31.6 GM/DL (ref 32.2–35.5)
MCV RBC AUTO: 117.5 FL (ref 80–94)
MONOCYTES # BLD: 4.7 %
MONOCYTES ABSOLUTE: 0.1 THOU/MM3 (ref 0.4–1.3)
NUCLEATED RED BLOOD CELLS: 13 /100 WBC
PATHOLOGIST REVIEW: ABNORMAL
PLATELET # BLD: 51 THOU/MM3 (ref 130–400)
PMV BLD AUTO: 10.9 FL (ref 9.4–12.4)
POIKILOCYTES: ABNORMAL
RBC # BLD: 1.94 MILL/MM3 (ref 4.7–6.1)
SEG NEUTROPHILS: 78.2 %
SEGMENTED NEUTROPHILS ABSOLUTE COUNT: 1.6 THOU/MM3 (ref 1.8–7.7)
WBC # BLD: 2.1 THOU/MM3 (ref 4.8–10.8)

## 2020-06-15 ENCOUNTER — HOSPITAL ENCOUNTER (OUTPATIENT)
Age: 55
Discharge: HOME | End: 2020-06-15
Payer: COMMERCIAL

## 2020-06-15 LAB
ANISOCYTOSIS: PRESENT
BASOPHILIA: ABNORMAL
BASOPHILS # BLD: 0.5 %
BASOPHILS ABSOLUTE: 0 THOU/MM3 (ref 0–0.1)
EOSINOPHIL # BLD: 0 %
EOSINOPHILS ABSOLUTE: 0 THOU/MM3 (ref 0–0.4)
ERYTHROCYTE [DISTWIDTH] IN BLOOD BY AUTOMATED COUNT: 19.7 % (ref 11.5–14.5)
ERYTHROCYTE [DISTWIDTH] IN BLOOD BY AUTOMATED COUNT: 86.9 FL (ref 35–45)
HCT VFR BLD CALC: 23.3 % (ref 42–52)
HEMOGLOBIN: 7.2 GM/DL (ref 14–18)
IMMATURE GRANS (ABS): 0.17 THOU/MM3 (ref 0–0.07)
IMMATURE GRANULOCYTES: 8.7 %
LYMPHOCYTES # BLD: 16.3 %
LYMPHOCYTES ABSOLUTE: 0.3 THOU/MM3 (ref 1–4.8)
MACROCYTES: PRESENT
MCH RBC QN AUTO: 37.7 PG (ref 26–33)
MCHC RBC AUTO-ENTMCNC: 30.9 GM/DL (ref 32.2–35.5)
MCV RBC AUTO: 122 FL (ref 80–94)
MONOCYTES # BLD: 7.1 %
MONOCYTES ABSOLUTE: 0.1 THOU/MM3 (ref 0.4–1.3)
NUCLEATED RED BLOOD CELLS: 16 /100 WBC
PLATELET # BLD: 63 THOU/MM3 (ref 130–400)
PMV BLD AUTO: 10.7 FL (ref 9.4–12.4)
RBC # BLD: 1.91 MILL/MM3 (ref 4.7–6.1)
SCAN OF BLOOD SMEAR: NORMAL
SEG NEUTROPHILS: 67.4 %
SEGMENTED NEUTROPHILS ABSOLUTE COUNT: 1.3 THOU/MM3 (ref 1.8–7.7)
WBC # BLD: 2 THOU/MM3 (ref 4.8–10.8)

## 2020-06-15 PROCEDURE — 85025 COMPLETE CBC W/AUTO DIFF WBC: CPT

## 2020-06-15 PROCEDURE — 36415 COLL VENOUS BLD VENIPUNCTURE: CPT

## 2020-07-02 ENCOUNTER — HOSPITAL ENCOUNTER (OUTPATIENT)
Age: 55
Setting detail: OBSERVATION
Discharge: ANOTHER HOSPITAL | End: 2020-07-03
Attending: EMERGENCY MEDICINE | Admitting: INTERNAL MEDICINE
Payer: COMMERCIAL

## 2020-07-02 ENCOUNTER — APPOINTMENT (OUTPATIENT)
Dept: GENERAL RADIOLOGY | Age: 55
End: 2020-07-02
Payer: COMMERCIAL

## 2020-07-02 ENCOUNTER — HOSPITAL ENCOUNTER (OUTPATIENT)
Dept: INFUSION THERAPY | Age: 55
Discharge: HOME | End: 2020-07-02
Payer: COMMERCIAL

## 2020-07-02 VITALS
TEMPERATURE: 97.8 F | RESPIRATION RATE: 14 BRPM | OXYGEN SATURATION: 98 % | SYSTOLIC BLOOD PRESSURE: 107 MMHG | HEART RATE: 71 BPM | DIASTOLIC BLOOD PRESSURE: 63 MMHG

## 2020-07-02 PROBLEM — R55 NEAR SYNCOPE: Status: ACTIVE | Noted: 2020-07-02

## 2020-07-02 LAB
ABO CHECK: NORMAL
ALBUMIN SERPL-MCNC: 3.8 G/DL (ref 3.5–5.1)
ALP BLD-CCNC: 62 U/L (ref 38–126)
ALT SERPL-CCNC: 33 U/L (ref 11–66)
ANION GAP SERPL CALCULATED.3IONS-SCNC: 15 MEQ/L (ref 8–16)
ANISOCYTOSIS: PRESENT
ANISOCYTOSIS: PRESENT
ANTIBODY SCREEN: NORMAL
AST SERPL-CCNC: 30 U/L (ref 5–40)
BASE EXCESS MIXED: -0.9 MMOL/L (ref -2–3)
BASOPHILIA: ABNORMAL
BASOPHILIA: ABNORMAL
BASOPHILS # BLD: 0.8 %
BASOPHILS # BLD: 0.8 %
BASOPHILS ABSOLUTE: 0 THOU/MM3 (ref 0–0.1)
BASOPHILS ABSOLUTE: 0 THOU/MM3 (ref 0–0.1)
BILIRUB SERPL-MCNC: 0.8 MG/DL (ref 0.3–1.2)
BILIRUBIN DIRECT: < 0.2 MG/DL (ref 0–0.3)
BILIRUBIN URINE: NEGATIVE
BLOOD, URINE: NEGATIVE
BUN BLDV-MCNC: 19 MG/DL (ref 7–22)
BUN BLDV-MCNC: 20 MG/DL (ref 7–22)
CALCIUM SERPL-MCNC: 9.1 MG/DL (ref 8.5–10.5)
CHARACTER, URINE: CLEAR
CHLORIDE BLD-SCNC: 94 MEQ/L (ref 98–111)
CHLORIDE, WHOLE BLOOD: 98 MEQ/L (ref 98–109)
CO2: 19 MEQ/L (ref 23–33)
CO2: 21 MEQ/L (ref 23–33)
COLLECTED BY:: ABNORMAL
COLOR: YELLOW
CREAT SERPL-MCNC: 1.5 MG/DL (ref 0.4–1.2)
CREATININE, WHOLE BLOOD: 1.8 MG/DL (ref 0.5–1.2)
EKG ATRIAL RATE: 70 BPM
EKG P AXIS: 59 DEGREES
EKG P-R INTERVAL: 182 MS
EKG Q-T INTERVAL: 400 MS
EKG QRS DURATION: 88 MS
EKG QTC CALCULATION (BAZETT): 432 MS
EKG R AXIS: -24 DEGREES
EKG T AXIS: 96 DEGREES
EKG VENTRICULAR RATE: 70 BPM
EOSINOPHIL # BLD: 0 %
EOSINOPHIL # BLD: 0 %
EOSINOPHILS ABSOLUTE: 0 THOU/MM3 (ref 0–0.4)
EOSINOPHILS ABSOLUTE: 0 THOU/MM3 (ref 0–0.4)
ERYTHROCYTE [DISTWIDTH] IN BLOOD BY AUTOMATED COUNT: 20.7 % (ref 11.5–14.5)
ERYTHROCYTE [DISTWIDTH] IN BLOOD BY AUTOMATED COUNT: 20.7 % (ref 11.5–14.5)
ERYTHROCYTE [DISTWIDTH] IN BLOOD BY AUTOMATED COUNT: 83.4 FL (ref 35–45)
ERYTHROCYTE [DISTWIDTH] IN BLOOD BY AUTOMATED COUNT: 84.8 FL (ref 35–45)
GFR SERPL CREATININE-BSD FRML MDRD: 48 ML/MIN/1.73M2
GFR, ESTIMATED: 42 ML/MIN/1.73M2
GLUCOSE BLD-MCNC: 141 MG/DL (ref 70–108)
GLUCOSE BLD-MCNC: 142 MG/DL (ref 70–108)
GLUCOSE URINE: NEGATIVE MG/DL
GLUCOSE, WHOLE BLOOD: 159 MG/DL (ref 70–108)
HCO3, MIXED: 24 MMOL/L (ref 23–28)
HCT VFR BLD CALC: 25.4 % (ref 42–52)
HCT VFR BLD CALC: 27.4 % (ref 42–52)
HEMOCCULT STL QL: NEGATIVE
HEMOGLOBIN: 8.1 GM/DL (ref 14–18)
HEMOGLOBIN: 8.7 GM/DL (ref 14–18)
IMMATURE GRANS (ABS): 0.13 THOU/MM3 (ref 0–0.07)
IMMATURE GRANS (ABS): 0.19 THOU/MM3 (ref 0–0.07)
IMMATURE GRANULOCYTES: 14.4 %
IMMATURE GRANULOCYTES: 9.8 %
INR BLD: 0.99 (ref 0.85–1.13)
IONIZED CALCIUM, WHOLE BLOOD: 1.05 MMOL/L (ref 1.12–1.32)
KETONES, URINE: NEGATIVE
LACTIC ACID: 2.6 MMOL/L (ref 0.5–2.2)
LEUKOCYTE ESTERASE, URINE: NEGATIVE
LYMPHOCYTES # BLD: 22 %
LYMPHOCYTES # BLD: 25.6 %
LYMPHOCYTES ABSOLUTE: 0.3 THOU/MM3 (ref 1–4.8)
LYMPHOCYTES ABSOLUTE: 0.3 THOU/MM3 (ref 1–4.8)
MACROCYTES: PRESENT
MACROCYTES: PRESENT
MCH RBC QN AUTO: 35.4 PG (ref 26–33)
MCH RBC QN AUTO: 36 PG (ref 26–33)
MCHC RBC AUTO-ENTMCNC: 31.8 GM/DL (ref 32.2–35.5)
MCHC RBC AUTO-ENTMCNC: 31.9 GM/DL (ref 32.2–35.5)
MCV RBC AUTO: 110.9 FL (ref 80–94)
MCV RBC AUTO: 113.2 FL (ref 80–94)
MONOCYTES # BLD: 16.7 %
MONOCYTES # BLD: 19.5 %
MONOCYTES ABSOLUTE: 0.2 THOU/MM3 (ref 0.4–1.3)
MONOCYTES ABSOLUTE: 0.3 THOU/MM3 (ref 0.4–1.3)
NITRITE, URINE: NEGATIVE
NUCLEATED RED BLOOD CELLS: 2 /100 WBC
NUCLEATED RED BLOOD CELLS: 3 /100 WBC
O2 SAT, MIXED: 80 %
OSMOLALITY CALCULATION: 265.8 MOSMOL/KG (ref 275–300)
PCO2, MIXED VENOUS: 39 MMHG (ref 41–51)
PH UA: 7.5 (ref 5–9)
PH, MIXED: 7.4 (ref 7.31–7.41)
PLATELET # BLD: 72 THOU/MM3 (ref 130–400)
PLATELET # BLD: 84 THOU/MM3 (ref 130–400)
PLATELET ESTIMATE: ABNORMAL
PLATELET ESTIMATE: ABNORMAL
PMV BLD AUTO: 10.3 FL (ref 9.4–12.4)
PMV BLD AUTO: 10.4 FL (ref 9.4–12.4)
PO2 MIXED: 44 MMHG (ref 25–40)
POTASSIUM REFLEX MAGNESIUM: 4.3 MEQ/L (ref 3.5–5.2)
POTASSIUM, WHOLE BLOOD: 4.4 MEQ/L (ref 3.5–4.9)
PRO-BNP: 1470 PG/ML (ref 0–900)
PROTEIN UA: NEGATIVE
RBC # BLD: 2.29 MILL/MM3 (ref 4.7–6.1)
RBC # BLD: 2.42 MILL/MM3 (ref 4.7–6.1)
RH FACTOR: NORMAL
SCAN OF BLOOD SMEAR: NORMAL
SCAN OF BLOOD SMEAR: NORMAL
SEG NEUTROPHILS: 44.3 %
SEG NEUTROPHILS: 46.1 %
SEGMENTED NEUTROPHILS ABSOLUTE COUNT: 0.6 THOU/MM3 (ref 1.8–7.7)
SEGMENTED NEUTROPHILS ABSOLUTE COUNT: 0.6 THOU/MM3 (ref 1.8–7.7)
SODIUM BLD-SCNC: 130 MEQ/L (ref 135–145)
SODIUM, WHOLE BLOOD: 130 MEQ/L (ref 138–146)
SPECIFIC GRAVITY, URINE: 1.01 (ref 1–1.03)
TOTAL PROTEIN: 5.9 G/DL (ref 6.1–8)
TROPONIN T: 0.03 NG/ML
UROBILINOGEN, URINE: 0.2 EU/DL (ref 0–1)
WBC # BLD: 1.3 THOU/MM3 (ref 4.8–10.8)
WBC # BLD: 1.3 THOU/MM3 (ref 4.8–10.8)

## 2020-07-02 PROCEDURE — 81003 URINALYSIS AUTO W/O SCOPE: CPT

## 2020-07-02 PROCEDURE — 93010 ELECTROCARDIOGRAM REPORT: CPT | Performed by: NUCLEAR MEDICINE

## 2020-07-02 PROCEDURE — 83880 ASSAY OF NATRIURETIC PEPTIDE: CPT

## 2020-07-02 PROCEDURE — 84484 ASSAY OF TROPONIN QUANT: CPT

## 2020-07-02 PROCEDURE — 80076 HEPATIC FUNCTION PANEL: CPT

## 2020-07-02 PROCEDURE — G0378 HOSPITAL OBSERVATION PER HR: HCPCS

## 2020-07-02 PROCEDURE — 80048 BASIC METABOLIC PNL TOTAL CA: CPT

## 2020-07-02 PROCEDURE — 36415 COLL VENOUS BLD VENIPUNCTURE: CPT

## 2020-07-02 PROCEDURE — 85025 COMPLETE CBC W/AUTO DIFF WBC: CPT

## 2020-07-02 PROCEDURE — 96361 HYDRATE IV INFUSION ADD-ON: CPT

## 2020-07-02 PROCEDURE — 71045 X-RAY EXAM CHEST 1 VIEW: CPT

## 2020-07-02 PROCEDURE — 86900 BLOOD TYPING SEROLOGIC ABO: CPT

## 2020-07-02 PROCEDURE — 93005 ELECTROCARDIOGRAM TRACING: CPT | Performed by: EMERGENCY MEDICINE

## 2020-07-02 PROCEDURE — 2580000003 HC RX 258: Performed by: INTERNAL MEDICINE

## 2020-07-02 PROCEDURE — 99220 PR INITIAL OBSERVATION CARE/DAY 70 MINUTES: CPT | Performed by: INTERNAL MEDICINE

## 2020-07-02 PROCEDURE — 2580000003 HC RX 258: Performed by: EMERGENCY MEDICINE

## 2020-07-02 PROCEDURE — 86901 BLOOD TYPING SEROLOGIC RH(D): CPT

## 2020-07-02 PROCEDURE — 6370000000 HC RX 637 (ALT 250 FOR IP): Performed by: INTERNAL MEDICINE

## 2020-07-02 PROCEDURE — 83605 ASSAY OF LACTIC ACID: CPT

## 2020-07-02 PROCEDURE — 6370000000 HC RX 637 (ALT 250 FOR IP): Performed by: PHYSICIAN ASSISTANT

## 2020-07-02 PROCEDURE — 99285 EMERGENCY DEPT VISIT HI MDM: CPT

## 2020-07-02 PROCEDURE — 80047 BASIC METABLC PNL IONIZED CA: CPT

## 2020-07-02 PROCEDURE — 82374 ASSAY BLOOD CARBON DIOXIDE: CPT

## 2020-07-02 PROCEDURE — 84520 ASSAY OF UREA NITROGEN: CPT

## 2020-07-02 PROCEDURE — 82272 OCCULT BLD FECES 1-3 TESTS: CPT

## 2020-07-02 PROCEDURE — 96360 HYDRATION IV INFUSION INIT: CPT

## 2020-07-02 PROCEDURE — 86850 RBC ANTIBODY SCREEN: CPT

## 2020-07-02 PROCEDURE — 85610 PROTHROMBIN TIME: CPT

## 2020-07-02 PROCEDURE — 82948 REAGENT STRIP/BLOOD GLUCOSE: CPT

## 2020-07-02 RX ORDER — POLYETHYLENE GLYCOL 3350 17 G/17G
17 POWDER, FOR SOLUTION ORAL DAILY PRN
Status: DISCONTINUED | OUTPATIENT
Start: 2020-07-02 | End: 2020-07-04 | Stop reason: HOSPADM

## 2020-07-02 RX ORDER — VALGANCICLOVIR 450 MG/1
450 TABLET, FILM COATED ORAL 2 TIMES DAILY
COMMUNITY
End: 2020-11-05

## 2020-07-02 RX ORDER — FLUTICASONE PROPIONATE 50 MCG
1 SPRAY, SUSPENSION (ML) NASAL DAILY
Status: DISCONTINUED | OUTPATIENT
Start: 2020-07-02 | End: 2020-07-04 | Stop reason: HOSPADM

## 2020-07-02 RX ORDER — ACETAMINOPHEN 325 MG/1
650 TABLET ORAL EVERY 6 HOURS PRN
Status: DISCONTINUED | OUTPATIENT
Start: 2020-07-02 | End: 2020-07-04 | Stop reason: HOSPADM

## 2020-07-02 RX ORDER — SODIUM CHLORIDE 0.9 % (FLUSH) 0.9 %
10 SYRINGE (ML) INJECTION PRN
Status: DISCONTINUED | OUTPATIENT
Start: 2020-07-02 | End: 2020-07-04 | Stop reason: HOSPADM

## 2020-07-02 RX ORDER — SODIUM CHLORIDE 9 MG/ML
1000 INJECTION, SOLUTION INTRAVENOUS CONTINUOUS
Status: DISCONTINUED | OUTPATIENT
Start: 2020-07-02 | End: 2020-07-02 | Stop reason: SDUPTHER

## 2020-07-02 RX ORDER — VALGANCICLOVIR 450 MG/1
450 TABLET, FILM COATED ORAL 2 TIMES DAILY
Status: DISCONTINUED | OUTPATIENT
Start: 2020-07-02 | End: 2020-07-04 | Stop reason: HOSPADM

## 2020-07-02 RX ORDER — 0.9 % SODIUM CHLORIDE 0.9 %
2000 INTRAVENOUS SOLUTION INTRAVENOUS ONCE
Status: COMPLETED | OUTPATIENT
Start: 2020-07-02 | End: 2020-07-02

## 2020-07-02 RX ORDER — PROCHLORPERAZINE MALEATE 10 MG
5 TABLET ORAL EVERY 8 HOURS PRN
Status: DISCONTINUED | OUTPATIENT
Start: 2020-07-02 | End: 2020-07-04 | Stop reason: HOSPADM

## 2020-07-02 RX ORDER — DAPSONE 25 MG/1
100 TABLET ORAL DAILY
Status: DISCONTINUED | OUTPATIENT
Start: 2020-07-02 | End: 2020-07-04 | Stop reason: HOSPADM

## 2020-07-02 RX ORDER — SODIUM CHLORIDE 0.9 % (FLUSH) 0.9 %
10 SYRINGE (ML) INJECTION EVERY 12 HOURS SCHEDULED
Status: DISCONTINUED | OUTPATIENT
Start: 2020-07-02 | End: 2020-07-04 | Stop reason: HOSPADM

## 2020-07-02 RX ORDER — SODIUM CHLORIDE 9 MG/ML
INJECTION, SOLUTION INTRAVENOUS CONTINUOUS
Status: DISCONTINUED | OUTPATIENT
Start: 2020-07-02 | End: 2020-07-04 | Stop reason: HOSPADM

## 2020-07-02 RX ORDER — PANTOPRAZOLE SODIUM 40 MG/1
40 TABLET, DELAYED RELEASE ORAL
Status: DISCONTINUED | OUTPATIENT
Start: 2020-07-03 | End: 2020-07-04 | Stop reason: HOSPADM

## 2020-07-02 RX ORDER — ALLOPURINOL 300 MG/1
300 TABLET ORAL DAILY
Status: DISCONTINUED | OUTPATIENT
Start: 2020-07-02 | End: 2020-07-03

## 2020-07-02 RX ORDER — CLONIDINE HYDROCHLORIDE 0.1 MG/1
0.1 TABLET ORAL 3 TIMES DAILY
Status: DISCONTINUED | OUTPATIENT
Start: 2020-07-02 | End: 2020-07-03

## 2020-07-02 RX ORDER — ACETAMINOPHEN 500 MG
1000 TABLET ORAL 2 TIMES DAILY
Status: DISCONTINUED | OUTPATIENT
Start: 2020-07-02 | End: 2020-07-03

## 2020-07-02 RX ORDER — ACETAMINOPHEN 650 MG/1
650 SUPPOSITORY RECTAL EVERY 6 HOURS PRN
Status: DISCONTINUED | OUTPATIENT
Start: 2020-07-02 | End: 2020-07-04 | Stop reason: HOSPADM

## 2020-07-02 RX ORDER — ONDANSETRON 2 MG/ML
4 INJECTION INTRAMUSCULAR; INTRAVENOUS EVERY 6 HOURS PRN
Status: DISCONTINUED | OUTPATIENT
Start: 2020-07-02 | End: 2020-07-04 | Stop reason: HOSPADM

## 2020-07-02 RX ORDER — ACYCLOVIR 800 MG/1
800 TABLET ORAL 2 TIMES DAILY
Status: DISCONTINUED | OUTPATIENT
Start: 2020-07-02 | End: 2020-07-03

## 2020-07-02 RX ORDER — BUDESONIDE 3 MG/1
9 CAPSULE, COATED PELLETS ORAL EVERY MORNING
Status: DISCONTINUED | OUTPATIENT
Start: 2020-07-03 | End: 2020-07-03

## 2020-07-02 RX ORDER — FLUCONAZOLE 200 MG/1
200 TABLET ORAL DAILY
Status: DISCONTINUED | OUTPATIENT
Start: 2020-07-02 | End: 2020-07-03

## 2020-07-02 RX ADMIN — METOPROLOL TARTRATE 25 MG: 25 TABLET, FILM COATED ORAL at 20:27

## 2020-07-02 RX ADMIN — ACETAMINOPHEN 650 MG: 325 TABLET ORAL at 22:51

## 2020-07-02 RX ADMIN — Medication 10 ML: at 20:31

## 2020-07-02 RX ADMIN — SODIUM CHLORIDE: 9 INJECTION, SOLUTION INTRAVENOUS at 20:30

## 2020-07-02 RX ADMIN — SODIUM CHLORIDE 2000 ML: 9 INJECTION, SOLUTION INTRAVENOUS at 12:42

## 2020-07-02 RX ADMIN — SODIUM CHLORIDE 1000 ML: 9 INJECTION, SOLUTION INTRAVENOUS at 16:04

## 2020-07-02 RX ADMIN — ALLOPURINOL 300 MG: 300 TABLET ORAL at 20:27

## 2020-07-02 RX ADMIN — VALGANCICLOVIR 450 MG: 450 TABLET, FILM COATED ORAL at 20:27

## 2020-07-02 RX ADMIN — Medication 3.3 MG: at 21:37

## 2020-07-02 ASSESSMENT — PAIN SCALES - GENERAL
PAINLEVEL_OUTOF10: 0
PAINLEVEL_OUTOF10: 5

## 2020-07-02 NOTE — ED NOTES
Bed: 041A  Expected date: 7/2/20  Expected time: 12:04 PM  Means of arrival: [facility]  Comments:     Octaviano Forrest RN  07/02/20 4778

## 2020-07-02 NOTE — ED NOTES
Dr. Columba Campuzano at bedside. Patient states, \"I'm starting to feel like I did right before I passed out. \"     Patricio Herbert RN  07/02/20 [address]  07/02/20 9350

## 2020-07-02 NOTE — ED NOTES
ED to inpatient nurses report    Chief Complaint   Patient presents with    Loss of Consciousness      Present to ED from home  LOC: alert and orientated to name, place, date  Vital signs   Vitals:    07/02/20 1246 07/02/20 1257 07/02/20 1346 07/02/20 1604   BP: (!) 68/55 99/78 (!) 124/92 (!) 145/97   Pulse:  66 77 78   Resp:  16 16 12   Temp:       TempSrc:       SpO2:  98% 99% 100%   Weight:       Height:          Oxygen Baseline RA    Current needs required RA Bipap/Cpap No  LDAs:   Peripheral IV 07/02/20 Right Forearm (Active)   Site Assessment Clean;Dry; Intact 7/2/2020  4:04 PM   Line Status Blood return noted;Normal saline locked; Flushed;Specimen collected; Infusing 7/2/2020 12:41 PM   Dressing Status Clean;Dry; Intact 7/2/2020 12:41 PM       Peripheral IV 07/02/20 Left Forearm (Active)   Site Assessment Clean;Dry; Intact 7/2/2020  4:05 PM   Dressing Status Clean;Dry; Intact 7/2/2020  4:05 PM   Dressing Intervention New 7/2/2020  4:05 PM     Mobility: Requires assistance * 1  Pending ED orders: None  Present condition: Stable, feels much better.   See VS.     Electronically signed by Torrey Grimm RN on 7/2/2020 at 4:31 PM       Torrey Grimm RN  07/02/20 5777

## 2020-07-02 NOTE — ED NOTES
Called [facility] and informed them that the patient is on their way to the unit. Patient transported via bed in a stable condition.      Tara Peralta  07/02/20 1700

## 2020-07-02 NOTE — H&P
Hospitalist - History & Physical      Patient: Jillian Morales    Unit/Bed:41/041A  YOB: 1965  MRN: [id number]   Acct: [de-identified]   PCP: Raghu Marroquin MD    Date of Service: Pt seen/examined on 07/02/20  and Admitted to Observation with expected LOS less than two midnights due to medical therapy. Chief Complaint: Near syncope    Assessment and Plan:-  1. Near syncope likely secondary to dehydration also on blood pressure pills-responded well with 2 L of fluid bolus-we will continue IV fluid maintenance at 100 mL/h 0.9. Patient drastically improving-we will will monitor orthostatic blood pressure unlikely this is a cardiac event. 2. 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 Locally follows Dr. Naomi Monroe. On prednisone, valganciclovir, dapsone, fluconazole, allopurinol for preventing [id number] disease as well as tumor lysis syndrome. 3. History of benign essential hypertension-on Coreg -continue  4. History of pancytopenia secondary to the medications above-reviewed the latest CBC report from [facility] and compared to today's - at baseline  5. History of GERD-on Prilosec-changed from Protonix during this admission  6. Recent history of DVT per records from [facility]-supposed to be on Lovenox full dose twice daily    History Of Present Illness:     This is 60 gentleman with past medical history as described below who was at the oncology outpatient clinic to get lab draws became near syncopal while waiting for the labs felt better and had lab draws and then had another near syncopal episode and was then transferred to our ER on arrival blood pressures were found in the systolic 30F to liter fluid bolus was given in the blood pressures came up to the systolic 069O and stayed there patient feels much better mildly nauseated still as per the patient he has been eating and drinking quite normally has not had any nausea vomiting or diarrhea. Retrospectively patient has history of AML status post peripheral blood stem cell transplant at [facility] follows up with oncologist there has been off chemo since 1 year. Past Medical History:        Diagnosis Date    Cancer ([facility] [address]     Depression     Erectile dysfunction     Fatigue     Hyperlipidemia     Hypertension     Leukemia ([facility] [address]     Seasonal allergies     Xeroderma        Past Surgical History:        Procedure Laterality Date    COLONOSCOPY  09/09/2016    Excision of Anal Skin Tag - Dr. Rosalba Viera  2006    bilateral feet    HERNIA REPAIR Right 2004    excision right groin skin tag, escision of penile lesion--Dr. Cierra Sung KNEE SURGERY  2010    right knee    KNEE SURGERY      2 times    NECK SURGERY      fusion    OR SPINE SURGERY PROCEDURE UNLISTED N/A 6/15/2018    L4-5 DECOMPRESSION, L4-5 POSTERIOR FUSION performed by No Campbell MD at [address]  12/08    cervical fusion   Jeraldqueowen Carmen TONSILLECTOMY      TOOTH EXTRACTION Left 5-1-15    [id number] VASECTOMY  12/07    Dr. Truong De Luna Medications:   No current facility-administered medications on file prior to encounter. Current Outpatient Medications on File Prior to Encounter   Medication Sig Dispense Refill    valGANciclovir (VALCYTE) 450 MG tablet Take 450 mg by mouth 2 times daily      omeprazole (PRILOSEC) 40 MG delayed release capsule take 1 capsule by mouth once daily if needed FOR STOMACH ACID.  90 capsule 3    fluconazole (DIFLUCAN) 200 MG tablet Take 200 mg by mouth daily      acyclovir (ZOVIRAX) 800 MG tablet Take 800 mg by mouth 2 times daily      budesonide (ENTOCORT EC) 3 MG extended release capsule Take 9 mg by mouth every morning      predniSONE (DELTASONE) 10 MG tablet Take 15 mg by mouth daily       dapsone 100 MG tablet Take 100 mg by mouth daily      ruxolitinib phosphate (JAKAFI) 5 MG tablet Take 5 mg by mouth 2 times daily       allopurinol (ZYLOPRIM) 300 MG tablet Take 300 mg by mouth daily      LORazepam (ATIVAN) 0.5 MG tablet Take 0.5 mg by mouth every 6 hours as needed for Anxiety.  metoprolol tartrate (LOPRESSOR) 25 MG tablet take 1 tablet by mouth twice a day 60 tablet 3    cloNIDine (CATAPRES) 0.1 MG tablet Take 1 tablet by mouth 3 times daily (Patient taking differently: Take 0.2 mg by mouth 3 times daily ) 270 tablet 1    fluticasone (FLONASE) 50 MCG/ACT nasal spray by Each Nostril route daily       prochlorperazine (COMPAZINE) 5 MG tablet Take 5 mg by mouth every 8 hours as needed      lidocaine (XYLOCAINE) 2 % jelly Apply 3 times daily 85 g 5    acetaminophen (TYLENOL 8 HOUR) 650 MG extended release tablet Take 1,300 mg by mouth 2 times daily         Allergies:    Amlodipine    Social History:    reports that he quit smoking about 24 years ago. His smoking use included cigarettes. He has a 12.50 pack-year smoking history. He has never used smokeless tobacco. He reports previous alcohol use. He reports current drug use. Drug: Marijuana. Family History:       Problem Relation Age of Onset    Heart Disease Mother     Diabetes Mother     Heart Failure Mother     Cancer Father         Prostate/Bladder    Diabetes Brother     Heart Disease Brother     Cancer Brother         Prostate    Cancer Maternal Grandmother     Heart Disease Maternal Grandmother     Heart Disease Paternal Grandmother     Diabetes Sister        Diet:  No diet orders on file    Review of systems:   Pertinent positives as noted in the HPI. All other systems reviewed and negative. PHYSICAL EXAM:  BP (!) 124/92   Pulse 77   Temp 98.2 °F (36.8 °C) (Oral)   Resp 16   Ht 5' 10\" (1.778 m)   Wt 200 lb (90.7 kg)   SpO2 99%   BMI 28.70 kg/m²   General appearance: No apparent distress, appears stated age and cooperative. HEENT: Normal cephalic, atraumatic without obvious deformity.  Pupils equal, round, and reactive to light. Extra ocular muscles intact. Conjunctivae/corneas clear. Neck: Supple, with full range of motion. No jugular venous distention. Trachea midline. Respiratory:  Normal respiratory effort. Clear to auscultation, bilaterally without Rales/Wheezes/Rhonchi. Cardiovascular: Regular rate and rhythm with normal S1/S2 without murmurs, rubs or gallops. Abdomen: Soft, non-tender, non-distended with normal bowel sounds. Musculoskeletal:  No clubbing, cyanosis or edema bilaterally. Skin: Skin color, texture, turgor normal.  No rashes or lesions. Neurologic:  Neurovascularly intact without any focal sensory/motor deficits. Cranial nerves: II-XII intact, grossly non-focal.  Psychiatric: Alert and oriented, thought content appropriate, normal insight  Capillary Refill: Brisk,< 3 seconds   Peripheral Pulses: +2 palpable, equal bilaterally     Labs:   Recent Labs     07/02/20  1149 07/02/20  1240   WBC 1.3* 1.3*   HGB 8.7* 8.1*   HCT 27.4* 25.4*   PLT 84* 72*     Recent Labs     07/02/20  1138 07/02/20  1240   * 130*   K 4.4 4.3   CL  --  94*   CO2  --  21*   BUN  --  20   CREATININE 1.8* 1.5*   CALCIUM  --  9.1     No results for input(s): AST, ALT, BILIDIR, BILITOT, ALKPHOS in the last 72 hours. Recent Labs     07/02/20  1240   INR 0.99     No results for input(s): Thelda Rough in the last 72 hours. Urinalysis:    Lab Results   Component Value Date    NITRU NEGATIVE 07/02/2020    BLOODU NEGATIVE 07/02/2020    SPECGRAV 1.020 10/09/2013    GLUCOSEU NEGATIVE 07/02/2020       Radiology:   XR CHEST PORTABLE   Final Result   No acute findings               **This report has been created using voice recognition software. It may contain minor errors which are inherent in voice recognition technology. **      Final report electronically signed by Dr. Sandoval Engle on 7/2/2020 1:30 PM        Xr Chest Portable    Result Date: 7/2/2020  PROCEDURE: XR CHEST PORTABLE CLINICAL INFORMATION: syncope. COMPARISON: June 18, 2019 TECHNIQUE: Portable  FINDINGS: No lobar consolidation. Costophrenic angles are preserved. Cardiomediastinal silhouette is within normal limits. No acute osseous findings. No acute findings **This report has been created using voice recognition software. It may contain minor errors which are inherent in voice recognition technology. ** Final report electronically signed by Dr. Surinder Woodard on 7/2/2020 1:30 PM        EKG  Normal sinus rhythm  Nonspecific T wave abnormality  Abnormal ECG  When compared with ECG of 18-JUN-2019 19:10,  Incomplete right bundle branch block is no longer Present  Criteria for Septal infarct are no longer Present  Confirmed by Ruth Felipe ([id number]) on 7/2/2020 3:14:33 PM  Electronically signed by Jake Marie MD on 7/2/2020 at 3:57 PM

## 2020-07-02 NOTE — ED PROVIDER NOTES
[address] [address] EMERGENCY DEPT  EMERGENCY DEPARTMENT     Pt Name: Melvina Stovall  MRN: [id number]  [place] 1965  Date of evaluation: 7/2/2020  Provider: Consuelo Lambert DO,     [facility]       Chief Complaint   Patient presents with    Loss of Consciousness       HISTORY OF PRESENT ILLNESS    Melvina Stovall is a 60 y.o. male who presents to the emergency department from oncologist office. Patient getting up his blood work he was lightheaded and nauseous. His blood pressure was noted to be in the 70s. Her blood pressure continued to be low upon arrival to the ED. he reports lightheadedness and generalized fatigue and weakness with his hypotension. He has a history of bone marrow transplant he follows at the [facility] today with Dr. Vandana De La Vega. He also follows with J[facility] at [facility]. He has been off chemotherapy for greater than 1. No falls or injuries no abdominal pain no chest pain no shortness of breath. No recent changes in his medication. Reports decreased p.o. intake over the last day. Triage notes and Nursing notes were reviewed by myself. Any discrepancies are addressed above.     PAST MEDICAL HISTORY     Past Medical History:   Diagnosis Date    Cancer [facility])     Depression     Erectile dysfunction     Fatigue     Hyperlipidemia     Hypertension     Leukemia ([facility] [address]     Seasonal allergies     Xeroderma        SURGICAL HISTORY       Past Surgical History:   Procedure Laterality Date    COLONOSCOPY  09/09/2016    Excision of Anal Skin Tag - Dr. Jordin Oconnell  2006    bilateral feet    HERNIA REPAIR Right 2004    excision right groin skin tag, escision of penile lesion--Dr. Walton Public KNEE SURGERY  2010    right knee    KNEE SURGERY      2 times    NECK SURGERY      fusion    GA SPINE SURGERY PROCEDURE UNLISTED N/A 6/15/2018    L4-5 DECOMPRESSION, L4-5 POSTERIOR FUSION performed by Roberto Catalan MD at [address]  12/08    cervical fusion   Piero Isreal TONSILLECTOMY      TOOTH EXTRACTION Left 5-1-15    [id number] [employer] [facility]    VASECTOMY  12/07    Dr. Alphonse Yao       Previous Medications    ACETAMINOPHEN (TYLENOL 8 HOUR) 650 MG EXTENDED RELEASE TABLET    Take 1,300 mg by mouth 2 times daily    ACYCLOVIR (ZOVIRAX) 800 MG TABLET    Take 800 mg by mouth 2 times daily    ALLOPURINOL (ZYLOPRIM) 300 MG TABLET    Take 300 mg by mouth daily    BUDESONIDE (ENTOCORT EC) 3 MG EXTENDED RELEASE CAPSULE    Take 9 mg by mouth every morning    CLONIDINE (CATAPRES) 0.1 MG TABLET    Take 1 tablet by mouth 3 times daily    DAPSONE 100 MG TABLET    Take 100 mg by mouth daily    FLUCONAZOLE (DIFLUCAN) 200 MG TABLET    Take 200 mg by mouth daily    FLUTICASONE (FLONASE) 50 MCG/ACT NASAL SPRAY    by Each Nostril route daily     LIDOCAINE (XYLOCAINE) 2 % JELLY    Apply 3 times daily    LORAZEPAM (ATIVAN) 0.5 MG TABLET    Take 0.5 mg by mouth every 6 hours as needed for Anxiety. METOPROLOL TARTRATE (LOPRESSOR) 25 MG TABLET    take 1 tablet by mouth twice a day    OMEPRAZOLE (PRILOSEC) 40 MG DELAYED RELEASE CAPSULE    take 1 capsule by mouth once daily if needed FOR STOMACH ACID.     PREDNISONE (DELTASONE) 10 MG TABLET    Take 15 mg by mouth daily     PROCHLORPERAZINE (COMPAZINE) 5 MG TABLET    Take 5 mg by mouth every 8 hours as needed    RUXOLITINIB PHOSPHATE (JAKAFI) 5 MG TABLET    Take 5 mg by mouth 2 times daily     VALGANCICLOVIR (VALCYTE) 450 MG TABLET    Take 450 mg by mouth 2 times daily       ALLERGIES     Amlodipine    FAMILY HISTORY       Family History   Problem Relation Age of Onset    Heart Disease Mother     Diabetes Mother     Heart Failure Mother     Cancer Father         Prostate/Bladder    Diabetes Brother     Heart Disease Brother     Cancer Brother         Prostate    Cancer Maternal Grandmother     Heart Disease Maternal Grandmother     Heart Disease Paternal Grandmother     Diabetes Sister         SOCIAL HISTORY Social History     Socioeconomic History    Marital status:      Spouse name: None    Number of children: None    Years of education: None    Highest education level: None   Occupational History    None   Social Needs    Financial resource strain: None    Food insecurity     Worry: None     Inability: None    Transportation needs     Medical: None     Non-medical: None   Tobacco Use    Smoking status: Former Smoker     Packs/day: 0.50     Years: 25.00     Pack years: 12.50     Types: Cigarettes     Last attempt to quit: 1996     Years since quittin.5    Smokeless tobacco: Never Used   Substance and Sexual Activity    Alcohol use: Not Currently     Alcohol/week: 0.0 standard drinks     Comment: Rarely    Drug use: Yes     Types: Marijuana     Comment: sleep aid/pain relief    Sexual activity: Not Currently     Partners: Female   Lifestyle    Physical activity     Days per week: None     Minutes per session: None    Stress: None   Relationships    Social connections     Talks on phone: None     Gets together: None     Attends Anabaptist service: None     Active member of club or organization: None     Attends meetings of clubs or organizations: None     Relationship status: None    Intimate partner violence     Fear of current or ex partner: None     Emotionally abused: None     Physically abused: None     Forced sexual activity: None   Other Topics Concern    None   Social History Narrative    None       REVIEW OF SYSTEMS     Review of Systems   Constitutional: Positive for fatigue. Negative for activity change, chills and fever. HENT: Negative for congestion, sinus pressure and sore throat. Eyes: Negative for photophobia, redness and visual disturbance. Respiratory: Negative for cough, chest tightness and shortness of breath. Cardiovascular: Negative for chest pain, palpitations and leg swelling. Gastrointestinal: Positive for nausea.  Negative for abdominal pain and vomiting. Endocrine: Negative for polydipsia and polyuria. Genitourinary: Negative for decreased urine volume, difficulty urinating, dysuria, flank pain, frequency and urgency. Musculoskeletal: Negative for back pain, myalgias and neck pain. Skin: Negative for color change and rash. Neurological: Positive for weakness and light-headedness. Negative for headaches. Hematological: Negative for adenopathy. Does not bruise/bleed easily. Psychiatric/Behavioral: Negative for confusion and self-injury. Except as noted above the remainder of the review of systems was reviewed and is. PHYSICAL EXAM    (up to 7 for level 4, 8 or more for level 5)     ED Triage Vitals [07/02/20 1232]   BP Temp Temp Source Pulse Resp SpO2 Height Weight   106/84 98.2 °F (36.8 °C) Oral 71 16 95 % 5' 10\" (1.778 m) 200 lb (90.7 kg)       Physical Exam  Vitals signs and nursing note reviewed. Constitutional:       General: He is not in acute distress. Appearance: He is well-developed. He is ill-appearing. He is not diaphoretic. HENT:      Head: Normocephalic and atraumatic. Nose: No congestion or rhinorrhea. Mouth/Throat:      Mouth: Mucous membranes are dry. Eyes:      Pupils: Pupils are equal, round, and reactive to light. Neck:      Musculoskeletal: Normal range of motion and neck supple. No neck rigidity or muscular tenderness. Vascular: No JVD. Cardiovascular:      Rate and Rhythm: Normal rate and regular rhythm. Pulses: Normal pulses. Heart sounds: Normal heart sounds. Pulmonary:      Effort: Pulmonary effort is normal.      Breath sounds: Normal breath sounds. Abdominal:      General: Abdomen is flat. Bowel sounds are normal. There is no distension. Palpations: Abdomen is soft. Tenderness: There is no abdominal tenderness. There is no guarding. Musculoskeletal: Normal range of motion. General: No tenderness or deformity.       Comments: Philomena's negative bilateral Lower extremities    Lymphadenopathy:      Cervical: No cervical adenopathy. Skin:     General: Skin is warm and dry. Capillary Refill: Capillary refill takes 2 to 3 seconds. Coloration: Skin is pale. Neurological:      General: No focal deficit present. Mental Status: He is alert and oriented to person, place, and time. GCS: GCS eye subscore is 4. GCS verbal subscore is 5. GCS motor subscore is 6. Cranial Nerves: No cranial nerve deficit. Sensory: No sensory deficit. Motor: No weakness or abnormal muscle tone. Comments: Moves all extremities          DIAGNOSTIC RESULTS     EKG:(none if blank)  All EKG's are interpreted by [facility] who either signs or Co-signs this chart in the absence of a cardiologist.    Sinus rhythm, RBBB. Similar to prior ECG. No STEMI    RADIOLOGY: (none if blank)   Interpretation per the Radiologistbelow, if available at the time of this note:    XR CHEST PORTABLE   Final Result   No acute findings               **This report has been created using voice recognition software. It may contain minor errors which are inherent in voice recognition technology. **      Final report electronically signed by Dr. Soila Benedict on 7/2/2020 1:30 PM          LABS:  Labs Reviewed   CBC WITH AUTO DIFFERENTIAL - Abnormal; Notable for the following components:       Result Value    WBC 1.3 (*)     RBC 2.29 (*)     Hemoglobin 8.1 (*)     Hematocrit 25.4 (*)     .9 (*)     MCH 35.4 (*)     MCHC 31.9 (*)     RDW-CV 20.7 (*)     RDW-SD 83.4 (*)     Platelets 72 (*)     All other components within normal limits   BASIC METABOLIC PANEL W/ REFLEX TO MG FOR LOW K - Abnormal; Notable for the following components:    Sodium 130 (*)     Chloride 94 (*)     CO2 21 (*)     Glucose 141 (*)     CREATININE 1.5 (*)     All other components within normal limits   TROPONIN - Abnormal; Notable for the following components:    Troponin T 0.031 (*) All other components within normal limits   BRAIN NATRIURETIC PEPTIDE - Abnormal; Notable for the following components:    Pro-BNP 1470.0 (*)     All other components within normal limits   BLOOD GAS, VENOUS - Abnormal; Notable for the following components:    PCO2, MIXED VENOUS 39 (*)     PO2, Mixed 44 (*)     All other components within normal limits   OSMOLALITY - Abnormal; Notable for the following components:    Osmolality Calc 265.8 (*)     All other components within normal limits   GLOMERULAR FILTRATION RATE, ESTIMATED - Abnormal; Notable for the following components:    Est, Glom Filt Rate 48 (*)     All other components within normal limits   POCT GLUCOSE - Abnormal; Notable for the following components:    POC Glucose 142 (*)     All other components within normal limits   PROTIME-INR   BLOOD OCCULT STOOL SCREEN #1   ANION GAP   URINE RT REFLEX TO CULTURE   LACTIC ACID, PLASMA   TYPE AND SCREEN       All other labs were within normal range or not returned as of this dictation. Please note, any cultures that may have been sent were not resulted at the time of this patient visit. EMERGENCY DEPARTMENT COURSE andMedical Decision Making:     MDM/   57-year-old male presents for evaluation of near syncope. His blood pressure was consistently in the 60s. He responded appropriately to 2 L IV fluid his blood pressure was then stabilized and 110- 120. Patient reports he feels better. Discussed disposition with patient blood pressure being in the 60s and 70s recommended admission. Patient is agreeable to this. Discussed case with hospitalist who graciously accepted the admission. ED Medications administered this visit:    Medications   0.9 % sodium chloride infusion (has no administration in time range)   0.9 % sodium chloride bolus (2,000 mLs Intravenous New Bag 7/2/20 1242)         Procedures: (None if blank)       CLINICAL       1. Syncope and collapse    2.  Hypotension, unspecified hypotension

## 2020-07-03 ENCOUNTER — APPOINTMENT (OUTPATIENT)
Dept: CT IMAGING | Age: 55
End: 2020-07-03
Payer: COMMERCIAL

## 2020-07-03 VITALS
BODY MASS INDEX: 30.24 KG/M2 | TEMPERATURE: 98.4 F | DIASTOLIC BLOOD PRESSURE: 106 MMHG | RESPIRATION RATE: 16 BRPM | HEIGHT: 70 IN | OXYGEN SATURATION: 95 % | WEIGHT: 211.2 LBS | SYSTOLIC BLOOD PRESSURE: 158 MMHG | HEART RATE: 84 BPM

## 2020-07-03 LAB
ANION GAP SERPL CALCULATED.3IONS-SCNC: 16 MEQ/L (ref 8–16)
ANISOCYTOSIS: PRESENT
BASOPHILIA: ABNORMAL
BASOPHILS # BLD: 1.1 %
BASOPHILS ABSOLUTE: 0 THOU/MM3 (ref 0–0.1)
BUN BLDV-MCNC: 17 MG/DL (ref 7–22)
CALCIUM SERPL-MCNC: 8.6 MG/DL (ref 8.5–10.5)
CHLORIDE BLD-SCNC: 99 MEQ/L (ref 98–111)
CO2: 19 MEQ/L (ref 23–33)
CREAT SERPL-MCNC: 1.4 MG/DL (ref 0.4–1.2)
EOSINOPHIL # BLD: 0 %
EOSINOPHILS ABSOLUTE: 0 THOU/MM3 (ref 0–0.4)
ERYTHROCYTE [DISTWIDTH] IN BLOOD BY AUTOMATED COUNT: 20.9 % (ref 11.5–14.5)
ERYTHROCYTE [DISTWIDTH] IN BLOOD BY AUTOMATED COUNT: 88.5 FL (ref 35–45)
GFR SERPL CREATININE-BSD FRML MDRD: 52 ML/MIN/1.73M2
GLUCOSE BLD-MCNC: 111 MG/DL (ref 70–108)
GLUCOSE BLD-MCNC: 204 MG/DL (ref 70–108)
GROUP A STREP CULTURE, REFLEX: NEGATIVE
HCT VFR BLD CALC: 27.1 % (ref 42–52)
HEMOGLOBIN: 8.3 GM/DL (ref 14–18)
IMMATURE GRANS (ABS): 0.15 THOU/MM3 (ref 0–0.07)
IMMATURE GRANULOCYTES: 8.1 %
LYMPHOCYTES # BLD: 23.8 %
LYMPHOCYTES ABSOLUTE: 0.5 THOU/MM3 (ref 1–4.8)
MACROCYTES: PRESENT
MCH RBC QN AUTO: 35.6 PG (ref 26–33)
MCHC RBC AUTO-ENTMCNC: 30.6 GM/DL (ref 32.2–35.5)
MCV RBC AUTO: 116.3 FL (ref 80–94)
MONOCYTES # BLD: 22.7 %
MONOCYTES ABSOLUTE: 0.4 THOU/MM3 (ref 0.4–1.3)
NUCLEATED RED BLOOD CELLS: 2 /100 WBC
PLATELET # BLD: 70 THOU/MM3 (ref 130–400)
PLATELET ESTIMATE: ABNORMAL
PMV BLD AUTO: 10.1 FL (ref 9.4–12.4)
POTASSIUM REFLEX MAGNESIUM: 4.2 MEQ/L (ref 3.5–5.2)
RBC # BLD: 2.33 MILL/MM3 (ref 4.7–6.1)
REFLEX THROAT C + S: NORMAL
SARS-COV-2, NAAT: NOT DETECTED
SCAN OF BLOOD SMEAR: NORMAL
SEG NEUTROPHILS: 44.3 %
SEGMENTED NEUTROPHILS ABSOLUTE COUNT: 0.8 THOU/MM3 (ref 1.8–7.7)
SODIUM BLD-SCNC: 134 MEQ/L (ref 135–145)
WBC # BLD: 1.9 THOU/MM3 (ref 4.8–10.8)

## 2020-07-03 PROCEDURE — 6370000000 HC RX 637 (ALT 250 FOR IP): Performed by: INTERNAL MEDICINE

## 2020-07-03 PROCEDURE — 96374 THER/PROPH/DIAG INJ IV PUSH: CPT

## 2020-07-03 PROCEDURE — 87880 STREP A ASSAY W/OPTIC: CPT

## 2020-07-03 PROCEDURE — 87070 CULTURE OTHR SPECIMN AEROBIC: CPT

## 2020-07-03 PROCEDURE — 6360000004 HC RX CONTRAST MEDICATION: Performed by: INTERNAL MEDICINE

## 2020-07-03 PROCEDURE — 99217 PR OBSERVATION CARE DISCHARGE MANAGEMENT: CPT | Performed by: INTERNAL MEDICINE

## 2020-07-03 PROCEDURE — 6360000002 HC RX W HCPCS: Performed by: OTOLARYNGOLOGY

## 2020-07-03 PROCEDURE — 85025 COMPLETE CBC W/AUTO DIFF WBC: CPT

## 2020-07-03 PROCEDURE — 36415 COLL VENOUS BLD VENIPUNCTURE: CPT

## 2020-07-03 PROCEDURE — 6360000002 HC RX W HCPCS: Performed by: INTERNAL MEDICINE

## 2020-07-03 PROCEDURE — 80048 BASIC METABOLIC PNL TOTAL CA: CPT

## 2020-07-03 PROCEDURE — 96372 THER/PROPH/DIAG INJ SC/IM: CPT

## 2020-07-03 PROCEDURE — G0378 HOSPITAL OBSERVATION PER HR: HCPCS

## 2020-07-03 PROCEDURE — 70491 CT SOFT TISSUE NECK W/DYE: CPT

## 2020-07-03 PROCEDURE — 99219 PR INITIAL OBSERVATION CARE/DAY 50 MINUTES: CPT | Performed by: OTOLARYNGOLOGY

## 2020-07-03 PROCEDURE — 82948 REAGENT STRIP/BLOOD GLUCOSE: CPT

## 2020-07-03 PROCEDURE — 2580000003 HC RX 258: Performed by: INTERNAL MEDICINE

## 2020-07-03 PROCEDURE — U0002 COVID-19 LAB TEST NON-CDC: HCPCS

## 2020-07-03 PROCEDURE — 96376 TX/PRO/DX INJ SAME DRUG ADON: CPT

## 2020-07-03 RX ORDER — CARVEDILOL 25 MG/1
25 TABLET ORAL 2 TIMES DAILY
COMMUNITY

## 2020-07-03 RX ORDER — ALLOPURINOL 300 MG/1
300 TABLET ORAL NIGHTLY
Status: DISCONTINUED | OUTPATIENT
Start: 2020-07-03 | End: 2020-07-04 | Stop reason: HOSPADM

## 2020-07-03 RX ORDER — DEXAMETHASONE SODIUM PHOSPHATE 4 MG/ML
8 INJECTION, SOLUTION INTRA-ARTICULAR; INTRALESIONAL; INTRAMUSCULAR; INTRAVENOUS; SOFT TISSUE ONCE
Status: COMPLETED | OUTPATIENT
Start: 2020-07-03 | End: 2020-07-03

## 2020-07-03 RX ORDER — DEXAMETHASONE SODIUM PHOSPHATE 4 MG/ML
4 INJECTION, SOLUTION INTRA-ARTICULAR; INTRALESIONAL; INTRAMUSCULAR; INTRAVENOUS; SOFT TISSUE EVERY 6 HOURS
Status: DISCONTINUED | OUTPATIENT
Start: 2020-07-03 | End: 2020-07-04 | Stop reason: HOSPADM

## 2020-07-03 RX ORDER — CARVEDILOL 25 MG/1
25 TABLET ORAL 2 TIMES DAILY WITH MEALS
Status: DISCONTINUED | OUTPATIENT
Start: 2020-07-03 | End: 2020-07-04 | Stop reason: HOSPADM

## 2020-07-03 RX ORDER — FLUCONAZOLE 200 MG/1
400 TABLET ORAL DAILY
Status: DISCONTINUED | OUTPATIENT
Start: 2020-07-03 | End: 2020-07-04 | Stop reason: HOSPADM

## 2020-07-03 RX ADMIN — ENOXAPARIN SODIUM 40 MG: 40 INJECTION SUBCUTANEOUS at 10:59

## 2020-07-03 RX ADMIN — SODIUM CHLORIDE: 9 INJECTION, SOLUTION INTRAVENOUS at 06:50

## 2020-07-03 RX ADMIN — VALGANCICLOVIR 450 MG: 450 TABLET, FILM COATED ORAL at 08:46

## 2020-07-03 RX ADMIN — CARVEDILOL 25 MG: 25 TABLET, FILM COATED ORAL at 10:59

## 2020-07-03 RX ADMIN — DEXAMETHASONE SODIUM PHOSPHATE 4 MG: 4 INJECTION, SOLUTION INTRAMUSCULAR; INTRAVENOUS at 21:06

## 2020-07-03 RX ADMIN — ENOXAPARIN SODIUM 40 MG: 40 INJECTION SUBCUTANEOUS at 21:35

## 2020-07-03 RX ADMIN — DAPSONE 100 MG: 25 TABLET ORAL at 16:21

## 2020-07-03 RX ADMIN — DEXAMETHASONE SODIUM PHOSPHATE 8 MG: 4 INJECTION, SOLUTION INTRA-ARTICULAR; INTRALESIONAL; INTRAMUSCULAR; INTRAVENOUS; SOFT TISSUE at 18:29

## 2020-07-03 RX ADMIN — FLUCONAZOLE 400 MG: 200 TABLET ORAL at 16:21

## 2020-07-03 RX ADMIN — CARVEDILOL 25 MG: 25 TABLET, FILM COATED ORAL at 20:57

## 2020-07-03 RX ADMIN — ALLOPURINOL 300 MG: 300 TABLET ORAL at 21:06

## 2020-07-03 RX ADMIN — IOPAMIDOL 80 ML: 755 INJECTION, SOLUTION INTRAVENOUS at 07:26

## 2020-07-03 RX ADMIN — DEXAMETHASONE SODIUM PHOSPHATE 4 MG: 4 INJECTION, SOLUTION INTRAMUSCULAR; INTRAVENOUS at 13:45

## 2020-07-03 RX ADMIN — PANTOPRAZOLE SODIUM 40 MG: 40 TABLET, DELAYED RELEASE ORAL at 16:22

## 2020-07-03 RX ADMIN — VALGANCICLOVIR 450 MG: 450 TABLET, FILM COATED ORAL at 21:42

## 2020-07-03 ASSESSMENT — PAIN SCALES - GENERAL
PAINLEVEL_OUTOF10: 6
PAINLEVEL_OUTOF10: 0

## 2020-07-03 ASSESSMENT — ENCOUNTER SYMPTOMS
SORE THROAT: 0
EYE REDNESS: 0
COUGH: 0
COLOR CHANGE: 0
VOMITING: 0
ABDOMINAL PAIN: 0
SINUS PRESSURE: 0
NAUSEA: 1
SHORTNESS OF BREATH: 0
BACK PAIN: 0
CHEST TIGHTNESS: 0
PHOTOPHOBIA: 0

## 2020-07-03 ASSESSMENT — PAIN DESCRIPTION - DESCRIPTORS: DESCRIPTORS: ACHING

## 2020-07-03 ASSESSMENT — PAIN DESCRIPTION - PROGRESSION: CLINICAL_PROGRESSION: GRADUALLY WORSENING

## 2020-07-03 ASSESSMENT — PAIN DESCRIPTION - FREQUENCY: FREQUENCY: CONTINUOUS

## 2020-07-03 ASSESSMENT — PAIN DESCRIPTION - PAIN TYPE: TYPE: ACUTE PAIN

## 2020-07-03 ASSESSMENT — PAIN - FUNCTIONAL ASSESSMENT: PAIN_FUNCTIONAL_ASSESSMENT: ACTIVITIES ARE NOT PREVENTED

## 2020-07-03 ASSESSMENT — PAIN DESCRIPTION - LOCATION: LOCATION: NECK

## 2020-07-03 ASSESSMENT — PAIN DESCRIPTION - ORIENTATION: ORIENTATION: LEFT

## 2020-07-03 ASSESSMENT — PAIN DESCRIPTION - ONSET: ONSET: ON-GOING

## 2020-07-03 NOTE — PLAN OF CARE
Problem: Falls - Risk of:  Goal: Will remain free from falls  Description: Will remain free from falls  Outcome: Ongoing  Note: Absence of falls this shift. Fall prevention in place. Fall band applied. Bed alarm activated as needed. Problem: Falls - Risk of:  Goal: Absence of physical injury  Description: Absence of physical injury  Outcome: Ongoing  Note: Absence of physical injury. Problem: Cardiac:  Goal: Ability to maintain vital signs within normal range will improve  Description: Ability to maintain vital signs within normal range will improve  Outcome: Ongoing  Note: Vitals stable this shift. Cardiac monitor in place. Problem: Cardiac:  Goal: Cardiovascular alteration will improve  Description: Cardiovascular alteration will improve  Outcome: Ongoing  Note: Pt denies any dizziness, or lightheadedness this shift. Problem: Pain:  Goal: Pain level will decrease  Description: Pain level will decrease  Outcome: Ongoing  Note: Pt complains of pain at a 5/10. Non pharmacological interventions completed which include relaxation, and rest. Pt states pain goal at a 0/10. Pain assessed every 4 hours, and as needed. PRN pain medications given as ordered. Problem: Pain:  Goal: Control of acute pain  Description: Control of acute pain  Outcome: Ongoing  Note: Pt complains of pain at a 5/10. Non pharmacological interventions completed which include relaxation, and rest. Pt states pain goal at a 0/10. Pain assessed every 4 hours, and as needed. PRN pain medications given as ordered. Problem: Pain:  Goal: Control of chronic pain  Description: Control of chronic pain  Outcome: Ongoing  Note: Pt complains of pain at a 5/10. Non pharmacological interventions completed which include relaxation, and rest. Pt states pain goal at a 0/10. Pain assessed every 4 hours, and as needed. PRN pain medications given as ordered.      Problem: Physical Regulation:  Goal: Will remain free from infection  Description:

## 2020-07-03 NOTE — PLAN OF CARE
Problem: Falls - Risk of:  Goal: Will remain free from falls  Description: Will remain free from falls  7/3/2020 1310 by Simon Amaya RN  Outcome: Ongoing  Note: Pt absent of  falls this shift. RN visual checks on pt hourly with rounds. Call light in reach, bed in lowest position. Fall band intact. Bed alarm set. Pt educated to not get up per self to reduce the risk for falls. Problem: Falls - Risk of:  Goal: Absence of physical injury  Description: Absence of physical injury  7/3/2020 1310 by Simon Amaya RN  Outcome: Ongoing  Note: Pt absent of  falls this shift. RN visual checks on pt hourly with rounds. Call light in reach, bed in lowest position. Fall band intact. Bed alarm set. Pt educated to not get up per self to reduce the risk for falls. Problem: Cardiac:  Goal: Ability to maintain vital signs within normal range will improve  Description: Ability to maintain vital signs within normal range will improve  7/3/2020 1310 by Simon Amaya RN  Outcome: Ongoing  Note: VS WNL. Will monitor. Problem: Cardiac:  Goal: Cardiovascular alteration will improve  Description: Cardiovascular alteration will improve  7/3/2020 1310 by Simon Amaya RN  Outcome: Ongoing  Note: VS WNL. Will monitor. Problem: Pain:  Goal: Pain level will decrease  Description: Pain level will decrease  7/3/2020 1310 by Simon Amaya RN  Outcome: Ongoing  Note: Pt has pain voiced this shift at 0/10. Pt pain goal is 0/10. RN continues to deliver PRN pain medications as ordered. RN tries to complete none invasive and none prescribed methods to help reduce pain like rest.  Pain re-assessed frequently. Bed alarm set after pain meds given. Fall band intact. Problem: Pain:  Goal: Control of acute pain  Description: Control of acute pain  7/3/2020 1310 by Simon Amaya RN  Outcome: Ongoing  Note: Pt has pain voiced this shift at 0/10. Pt pain goal is 0/10.   RN continues to deliver PRN

## 2020-07-03 NOTE — CONSULTS
Inpatient consult to Otolaryngology  Consult performed by: Vinny Fountain MD  Consult ordered by: Malachi Bacon MD  Reason for consult: Shortness of breath and odynophagia with a mass seen on CT scan      Responded to a consult request by the hospitalist service, I have come to the patient's bedside interviewed him and performed a general physical examination to arrive at my preliminary opinions. Chief complaint: I have a lump in the throat that feels like it hurts and is making me short of breath and having trouble swallowing since yesterday    History of present illness Mr. Raghav Hargrove is a 77-year-old male with a one-year history of leukemia which is been treated by various means at [facility] and specifically status post a stem cell transplant in November 12, 2019. He was returning to a cancer center here in [facility] for an interval set of blood tests when he reportedly had a syncopal episode within a minute of getting out of his car. Fortunately he was being escorted by his young and very strong son who was able to catch him and lower him down to the ground. His son who is with him for significant part of my interview, was able to describe that his father was becoming unconscious for several seconds, speaking nonsense \"like a baby\" before he was unresponsive and he laid him on the ground. He did notice some twitching but nothing that looked like a seizure to his understanding. He was helped into a wheelchair by attendance from the clinic and went inside to have blood studies drawn. He was placed in a Trendelenburg position with his legs up in his head below them. His son clearly describes a very vigorous coughing gesture/paroxysm that \"was from his stomach\" per the son's description and sounded wet to him as though he had brought up fluid from his GI tract. He regained consciousness quickly and went through his phlebotomy.   He again had a hypertensive episode when being brought upright that he was aware was about to happen. It was after coming around from the second episode when the patient started noticing that he had severe throat pain and a sensation of a foreign body/lump in his throat that was particularly tender on swallowing. Subsequently the swallowing difficulty was joined by shortness of breath attributable to the sensation of a mass in his throat. Both were at their worst last night and according the patient have been gradually improving since then but he still feels both sensations right now over 12 hours later. He points to his left neck at the level of the sternocleidomastoid chest lateral and inferior to the inferior aspect of his larynx. Palpating that area the patient describes tenderness and the sensation of a mass inside his throat being manipulated by the palpation. The patient was recently changed from his antihypertensive medication that he had been on for a prolonged period of time and although he was told he was dehydrated, based on the fact that his blood pressure was very low just after his syncopal episodes x2 and improved with IV hydration. To the contrary, he has been drinking over 64 ounces of water per day in addition to what he has been drinking with meals in order to improve his renal function. In the last 24 hours his intake is probably within less than this typical target in large measure because he was either on his way to have his blood drawn or admitted post syncope. He denies ever having had any of these experiences previously. His voice quality has also deteriorated during the short interval with also some degree of improvement over the last 12 hours. He states unequivocally \"I do not sound like this normally\". On general physical exam I found the patient sitting up at about 45 degrees and breathing without labor.   His voice is abnormal and that it is moderately low in pitch, mildly to exam the patient was informed that he was being transferred to a stepdown unit for closer monitoring. I agree with this change. He needs a fiberoptic nasolaryngoscopy. I will return this afternoon with a fiberoptic scope to examine his oropharynx hypopharynx and larynx under local anesthesia so he does not need to be kept n.p.o. Do not hesitate to contact me should you have any questions or concerns prior to my return. Janice Mclain. Melida Vivas MD, [facility]  Cell: [phone]    This consult note was dictated with computer voice recognition and has a high likelihood of having minor word errors within it.

## 2020-07-03 NOTE — FLOWSHEET NOTE
07/03/20 0253   Provider Notification   Reason for Communication Evaluate   Provider Name Gómez Martínez   Provider Notification Advance Practice Clinician (CNS, NP, CNM, CRNA, PA)   Method of Communication Secure Message   Response Waiting for response   Notification Time [phone]   Pt stating that he feels like his throat is closing. Gómez ARRIAGA notified.

## 2020-07-03 NOTE — PROGRESS NOTES
Dr. Yonathan Yanez called and stated this pt will need transferred to [facility] for the mass. Dr. Alyssa Mcdaniels notified.

## 2020-07-03 NOTE — PROGRESS NOTES
Hospitalist -progress    Patient: Michael Pinto    Unit/Bed:8B-26/026-A  YOB: 1965  MRN: [id number]   Acct: [de-identified]   PCP: Daly Juan MD    Date of Service: Pt seen/examined on 07/03/20  and Admitted to Observation with expected LOS less than two midnights due to medical therapy. Chief Complaint: Near syncope    Assessment and Plan:-  1. Near syncope likely secondary to dehydration also on blood pressure pills-responded well with 2 L of fluid bolus-we will continue IV fluid maintenance at 100 mL/h 0.9. Patient drastically improving-we will will monitor orthostatic blood pressure unlikely this is a cardiac event. 7/2-no dizziness feeling much better blood pressures are stable  2. 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 Locally follows Dr. Alyssa Yu. On prednisone, valganciclovir, dapsone, fluconazole, allopurinol for preventing wtaru-rvftdl-ibkg disease as well as tumor lysis syndrome. SEE further plans will  3. ?  Laryngeal mass-had sore throat and feeling of fullness in the throat last night after admission-CT of the soft tissues of the neck was done which showed a laryngeal mass-consulted oncology and ENT oncologist recommended transfer to [place]. Spoke with a representative [place] will accept awaiting bed. Watch for any stridor, drooling, and oxygen saturations, keep n.p.o. for now-added Decadron IV stop oral prednisone. 4. History of benign essential hypertension-on Coreg -continue  5. History of pancytopenia secondary to the medications above-reviewed the latest CBC report from [facility] and compared to today's - at baseline  6. History of GERD-on Prilosec-changed from Protonix during this admission  7.  Recent history of DVT per records from [facility]-supposed to be on Lovenox full dose twice daily but at a lower dose than usual recommended by the hematologist at sodium chloride flush, acetaminophen **OR** acetaminophen, polyethylene glycol, ondansetron, menthol     Diet:  Diet NPO Effective Now    Review of systems:   Pertinent positives as noted in the HPI. All other systems reviewed and negative. PHYSICAL EXAM:  BP (!) 141/93   Pulse 90   Temp 98.2 °F (36.8 °C) (Oral)   Resp 20   Ht 5' 10\" (1.778 m)   Wt 211 lb 3.2 oz (95.8 kg)   SpO2 93%   BMI 30.30 kg/m²   General appearance: No apparent distress, appears stated age and cooperative. HEENT: Normal cephalic, atraumatic without obvious deformity. Pupils equal, round, and reactive to light. Extra ocular muscles intact. Conjunctivae/corneas clear. Neck: Supple, with full range of motion. No jugular venous distention. Trachea midline. Respiratory:  Normal respiratory effort. Clear to auscultation, bilaterally without Rales/Wheezes/Rhonchi. Cardiovascular: Regular rate and rhythm with normal S1/S2 without murmurs, rubs or gallops. Abdomen: Soft, non-tender, non-distended with normal bowel sounds. Musculoskeletal:  No clubbing, cyanosis or edema bilaterally. Skin: Skin color, texture, turgor normal.  No rashes or lesions. Neurologic:  Neurovascularly intact without any focal sensory/motor deficits.  Cranial nerves: II-XII intact, grossly non-focal.  Psychiatric: Alert and oriented, thought content appropriate, normal insight  Capillary Refill: Brisk,< 3 seconds   Peripheral Pulses: +2 palpable, equal bilaterally     Labs:   Recent Labs     07/02/20  1149 07/02/20  1240 07/03/20  0516   WBC 1.3* 1.3* 1.9*   HGB 8.7* 8.1* 8.3*   HCT 27.4* 25.4* 27.1*   PLT 84* 72* 70*     Recent Labs     07/02/20  1138 07/02/20  1240 07/03/20  0516   * 130* 134*   K 4.4 4.3 4.2   CL  --  94* 99   CO2 19* 21* 19*   BUN 19 20 17   CREATININE 1.8* 1.5* 1.4*   CALCIUM  --  9.1 8.6     Recent Labs     07/02/20  1138   AST 30   ALT 33   BILIDIR <0.2   BILITOT 0.8   ALKPHOS 62     Recent Labs     07/02/20  1240   INR 0.99     No compared with ECG of 18-JUN-2019 19:10,  Incomplete right bundle branch block is no longer Present  Criteria for Septal infarct are no longer Present  Confirmed by Ruth Felipe ([id number]) on 7/2/2020 3:14:33 PM  Electronically signed by Jake Marie MD on 7/3/2020 at 12:58 PM

## 2020-07-03 NOTE — PROCEDURES
Rome Rosado is a 60 y.o. male patient. 1. Syncope and collapse      Past Medical History:   Diagnosis Date    Cancer ([facility] [address]     Depression     Erectile dysfunction     Fatigue     Hyperlipidemia     Hypertension     Leukemia (HCC)     Seasonal allergies     Xeroderma      Blood pressure (!) 141/93, pulse 90, temperature 98.2 °F (36.8 °C), temperature source Oral, resp. rate 20, height 5' 10\" (1.778 m), weight 211 lb 3.2 oz (95.8 kg), SpO2 93 %. Procedures Flexible laryngoscopy    Indication: New onset shortness of breath at rest associated with dysphagia and abnormal CT scan with mass of hypopharynx and larynx being seen. Preparation: The indication for the procedure was carefully explained to the patient and his son prior to obtaining his informed verbal consent. I sprayed Regulo-Synephrine and topical 4% lidocaine into his right nasal airway to enable the endoscopy. Findings: Nasopharynx: The patient's nasopharynx was widely patent and symmetrical in contour. Oropharynx: The patient's oropharynx was also normal with a normal tongue base and vallecula and normal contours to the pharyngeal walls throughout the region. Hypopharynx and larynx: The structures were very abnormal for the presence of complete effacement of the left piriform sinus due to what appears to be an acute edema based process. The posterior aryepiglottic fold was bulbous and had a mild tendency towards prolapsing towards the airway on deep respiration. This edema extended into the interarytenoid space but not onto the right aryepiglottic fold. Post cricoid space was also edematous and that did cross the midline but not into the right piriform sinus. Both cords had normal volume and normal movement. The glottic and subglottic aperture were both normal.  The epiglottis had normal contour and normal vertical condition. No malacic flapping could be seen of the epiglottic cartilage even with deep respiration or coughing. There was no pooling seen in the post cricoid space or the right piriform sinus. There was no granularity or ulceration in the mucosa. None of the mucosal surfaces were particularly hyperemic. The patient tolerated the procedure well. Impressions and recommendations based on this procedure: The patient appears to have a soft tissue edema process that is restricted to the posterior lateral piriform sinus pharynx and supraglottis. Most likely last night when he was much more symptomatic, these tissues were prolapsing medially and anteriorly causing significant airway obstruction. Fortunately with time this has diminished. He still has an AT RISK airway so I concur with the decision of his inpatient team to move him to a monitored bed for closer observation. Plans have been made to transfer the patient to [facility] for further subspecialty care. If he continues to improve, that may not be necessary. Should he deteriorate I recommend:  1. High flow nasal cannula at 20 L with FiO2 being adjusted to sustain his saturations in the high 90s  2. Consider CPAP or BiPAP to distend the distal soft tissues and improve airflow passage by neutralizing the vacuum gradient the patient needs to exert to inhale  3. Increase his initial dose of Decadron to 12 mg by adding 8 mg now and sustaining him with 4 mg every 6 as has already been ordered. (This was okayed by his admitting hospitalist so I have already placed this order). 4.  If the patient needs to be intubated, I would recommend a video laryngoscope to assist in the traversing of very abnormal anatomy. 5.  A surgical tracheostomy would be very difficult given the size of his neck and the potential that reaching the airway without a high degree of surgical comfort and strength might be less than successful. It is unlikely that this patient will require surgical airway acutely if he continues to improve as he already has compared to last evening.   Why this process has occurred remains a question and may be related to his high fluid intake and recent changes in his blood pressure medication versus some allergic process that is currently unidentified. I will contact the [facility] otolaryngology team to give them a heads up about this patient in the event that the effort to transfer the patient moves forward. If for whatever reason he stays here at [facility], I would be happy to continue to be involved in his care should he need specific intervention. Do not hesitate to contact me with any questions or needs. My cellular phone number is [phone]. Nabil Burch. Jose Grey MD, Lurdes Chaves MD  7/3/2020      16:00 I contacted [facility]'s consult line to speak to the ENT on-call service in order to give them the benefit of knowing what was found on his fiber optic endoscopy a half an hour ago. They took my number and will have someone call me to confer.    pfc

## 2020-07-04 NOTE — DISCHARGE SUMMARY
full dose twice daily but at a lower dose than usual recommended by the hematologist at [facility]. STABLE FOR DISCHARGE  Plan for discharge to [facility] today spoke to the hospitalist who is accepting the patient. Discharge time 35 minutes      History Of Present Illness: This is 60 gentleman with past medical history as described below who was at the oncology outpatient clinic to get lab draws became near syncopal while waiting for the labs felt better and had lab draws and then had another near syncopal episode and was then transferred to our ER on arrival blood pressures were found in the systolic 35E to liter fluid bolus was given in the blood pressures came up to the systolic 525H and stayed there patient feels much better mildly nauseated still as per the patient he has been eating and drinking quite normally has not had any nausea vomiting or diarrhea. Retrospectively patient has history of AML status post peripheral blood stem cell transplant at [facility] follows up with oncologist there has been off chemo since 1 year. Past medical history, family history, social history and allergies reviewed again and is unchanged since admission. ROS (12 point review of systems completed. Pertinent positives noted. Otherwise ROS is negative)      Scheduled Meds:    Continuous Infusions:    PRN Meds:.     Diet:  No diet orders on file    Review of systems:   Pertinent positives as noted in the HPI. All other systems reviewed and negative. PHYSICAL EXAM:  BP (!) 158/106   Pulse 84   Temp 98.4 °F (36.9 °C) (Oral)   Resp 16   Ht 5' 10\" (1.778 m)   Wt 211 lb 3.2 oz (95.8 kg)   SpO2 95%   BMI 30.30 kg/m²   General appearance: No apparent distress, appears stated age and cooperative. HEENT: Normal cephalic, atraumatic without obvious deformity. Pupils equal, round, and reactive to light. Extra ocular muscles intact. Conjunctivae/corneas clear.   Neck: Supple, with full range of motion. No jugular venous distention. Trachea midline. Respiratory:  Normal respiratory effort. Clear to auscultation, bilaterally without Rales/Wheezes/Rhonchi. Cardiovascular: Regular rate and rhythm with normal S1/S2 without murmurs, rubs or gallops. Abdomen: Soft, non-tender, non-distended with normal bowel sounds. Musculoskeletal:  No clubbing, cyanosis or edema bilaterally. Skin: Skin color, texture, turgor normal.  No rashes or lesions. Neurologic:  Neurovascularly intact without any focal sensory/motor deficits. Cranial nerves: II-XII intact, grossly non-focal.  Psychiatric: Alert and oriented, thought content appropriate, normal insight  Capillary Refill: Brisk,< 3 seconds   Peripheral Pulses: +2 palpable, equal bilaterally     Labs:   Recent Labs     07/02/20  1149 07/02/20  1240 07/03/20  0516   WBC 1.3* 1.3* 1.9*   HGB 8.7* 8.1* 8.3*   HCT 27.4* 25.4* 27.1*   PLT 84* 72* 70*     Recent Labs     07/02/20  1138 07/02/20  1240 07/03/20  0516   * 130* 134*   K 4.4 4.3 4.2   CL  --  94* 99   CO2 19* 21* 19*   BUN 19 20 17   CREATININE 1.8* 1.5* 1.4*   CALCIUM  --  9.1 8.6     Recent Labs     07/02/20  1138   AST 30   ALT 33   BILIDIR <0.2   BILITOT 0.8   ALKPHOS 62     Recent Labs     07/02/20  1240   INR 0.99     No results for input(s): CKTOTAL, TROPONINI in the last 72 hours. Urinalysis:    Lab Results   Component Value Date    NITRU NEGATIVE 07/02/2020    BLOODU NEGATIVE 07/02/2020    SPECGRAV 1.020 10/09/2013    GLUCOSEU NEGATIVE 07/02/2020       Radiology:   CT SOFT TISSUE NECK W CONTRAST   Final Result    Heterogeneous mass centered at the left supraglottic larynx with involvement of the left arytenoid and thyroid cartilage and abutting the left internal carotid artery. 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 Indeterminate involvement of a group of small left level 2 and level 3 lymph nodes. **This report has been created using voice recognition software. It may contain minor errors which are inherent in voice recognition technology. **      Final report electronically signed by Dr. Pamela Haque MD on 7/3/2020 8:25 AM      XR CHEST PORTABLE   Final Result   No acute findings               **This report has been created using voice recognition software. It may contain minor errors which are inherent in voice recognition technology. **      Final report electronically signed by Dr. Roderick Kirk on 7/2/2020 1:30 PM        Xr Chest Portable    Result Date: 7/2/2020  PROCEDURE: XR CHEST PORTABLE CLINICAL INFORMATION: syncope. COMPARISON: June 18, 2019 TECHNIQUE: Portable  FINDINGS: No lobar consolidation. Costophrenic angles are preserved. Cardiomediastinal silhouette is within normal limits. No acute osseous findings. No acute findings **This report has been created using voice recognition software. It may contain minor errors which are inherent in voice recognition technology. ** Final report electronically signed by Dr. Roderick Kirk on 7/2/2020 1:30 PM        EKG  Normal sinus rhythm  Nonspecific T wave abnormality  Abnormal ECG  When compared with ECG of 18-JUN-2019 19:10,  Incomplete right bundle branch block is no longer Present  Criteria for Septal infarct are no longer Present  Confirmed by Pradeep Quintero ([id number]) on 7/2/2020 3:14:33 PM  Electronically signed by Collette Fireman, MD on 7/4/2020 at 10:09 AM

## 2020-07-05 LAB — THROAT/NOSE CULTURE: NORMAL

## 2020-07-20 ENCOUNTER — HOSPITAL ENCOUNTER (OUTPATIENT)
Age: 55
Discharge: HOME | End: 2020-07-20
Payer: COMMERCIAL

## 2020-07-20 LAB
ALBUMIN SERPL-MCNC: 4 G/DL (ref 3.5–5.1)
ALP BLD-CCNC: 53 U/L (ref 38–126)
ALT SERPL-CCNC: 17 U/L (ref 11–66)
ANION GAP SERPL CALCULATED.3IONS-SCNC: 11 MEQ/L (ref 8–16)
ANISOCYTOSIS: PRESENT
AST SERPL-CCNC: 15 U/L (ref 5–40)
BASOPHILS # BLD: 0.4 %
BASOPHILS ABSOLUTE: 0 THOU/MM3 (ref 0–0.1)
BILIRUB SERPL-MCNC: 0.6 MG/DL (ref 0.3–1.2)
BILIRUBIN DIRECT: < 0.2 MG/DL (ref 0–0.3)
BUN BLDV-MCNC: 20 MG/DL (ref 7–22)
CHLORIDE BLD-SCNC: 101 MEQ/L (ref 98–111)
CO2: 25 MEQ/L (ref 23–33)
CREAT SERPL-MCNC: 1.2 MG/DL (ref 0.4–1.2)
EOSINOPHIL # BLD: 0.4 %
EOSINOPHILS ABSOLUTE: 0 THOU/MM3 (ref 0–0.4)
ERYTHROCYTE [DISTWIDTH] IN BLOOD BY AUTOMATED COUNT: 21.5 % (ref 11.5–14.5)
ERYTHROCYTE [DISTWIDTH] IN BLOOD BY AUTOMATED COUNT: 88.1 FL (ref 35–45)
GFR SERPL CREATININE-BSD FRML MDRD: 62 ML/MIN/1.73M2
HCT VFR BLD CALC: 27 % (ref 42–52)
HEMOGLOBIN: 8.1 GM/DL (ref 14–18)
IMMATURE GRANS (ABS): 0.07 THOU/MM3 (ref 0–0.07)
IMMATURE GRANULOCYTES: 2.6 %
LYMPHOCYTES # BLD: 18.6 %
LYMPHOCYTES ABSOLUTE: 0.5 THOU/MM3 (ref 1–4.8)
MACROCYTES: PRESENT
MCH RBC QN AUTO: 33.8 PG (ref 26–33)
MCHC RBC AUTO-ENTMCNC: 30 GM/DL (ref 32.2–35.5)
MCV RBC AUTO: 112.5 FL (ref 80–94)
MONOCYTES # BLD: 29.4 %
MONOCYTES ABSOLUTE: 0.8 THOU/MM3 (ref 0.4–1.3)
NUCLEATED RED BLOOD CELLS: 2 /100 WBC
PLATELET # BLD: 103 THOU/MM3 (ref 130–400)
PMV BLD AUTO: 10 FL (ref 9.4–12.4)
POTASSIUM SERPL-SCNC: 4.5 MEQ/L (ref 3.5–5.2)
RBC # BLD: 2.4 MILL/MM3 (ref 4.7–6.1)
SEG NEUTROPHILS: 48.6 %
SEGMENTED NEUTROPHILS ABSOLUTE COUNT: 1.3 THOU/MM3 (ref 1.8–7.7)
SODIUM BLD-SCNC: 137 MEQ/L (ref 135–145)
TOTAL PROTEIN: 5.9 G/DL (ref 6.1–8)
WBC # BLD: 2.7 THOU/MM3 (ref 4.8–10.8)

## 2020-07-20 PROCEDURE — 82565 ASSAY OF CREATININE: CPT

## 2020-07-20 PROCEDURE — 80051 ELECTROLYTE PANEL: CPT

## 2020-07-20 PROCEDURE — 36415 COLL VENOUS BLD VENIPUNCTURE: CPT

## 2020-07-20 PROCEDURE — 80076 HEPATIC FUNCTION PANEL: CPT

## 2020-07-20 PROCEDURE — 85025 COMPLETE CBC W/AUTO DIFF WBC: CPT

## 2020-07-20 PROCEDURE — 84520 ASSAY OF UREA NITROGEN: CPT

## 2020-08-21 ENCOUNTER — HOSPITAL ENCOUNTER (OUTPATIENT)
Age: 55
Discharge: HOME | End: 2020-08-21
Payer: COMMERCIAL

## 2020-08-21 LAB
ALBUMIN SERPL-MCNC: 3.8 G/DL (ref 3.5–5.1)
ALP BLD-CCNC: 45 U/L (ref 38–126)
ALT SERPL-CCNC: 12 U/L (ref 11–66)
ANION GAP SERPL CALCULATED.3IONS-SCNC: 8 MEQ/L (ref 8–16)
ANISOCYTOSIS: PRESENT
AST SERPL-CCNC: 17 U/L (ref 5–40)
BASOPHILS # BLD: 0.4 %
BASOPHILS ABSOLUTE: 0 THOU/MM3 (ref 0–0.1)
BILIRUB SERPL-MCNC: 0.3 MG/DL (ref 0.3–1.2)
BILIRUBIN DIRECT: < 0.2 MG/DL (ref 0–0.3)
BUN BLDV-MCNC: 29 MG/DL (ref 7–22)
CALCIUM SERPL-MCNC: 9 MG/DL (ref 8.5–10.5)
CHLORIDE BLD-SCNC: 105 MEQ/L (ref 98–111)
CO2: 23 MEQ/L (ref 23–33)
CREAT SERPL-MCNC: 1.1 MG/DL (ref 0.4–1.2)
EOSINOPHIL # BLD: 1.1 %
EOSINOPHILS ABSOLUTE: 0.1 THOU/MM3 (ref 0–0.4)
ERYTHROCYTE [DISTWIDTH] IN BLOOD BY AUTOMATED COUNT: 18.2 % (ref 11.5–14.5)
ERYTHROCYTE [DISTWIDTH] IN BLOOD BY AUTOMATED COUNT: 78 FL (ref 35–45)
GFR SERPL CREATININE-BSD FRML MDRD: 69 ML/MIN/1.73M2
GLUCOSE BLD-MCNC: 119 MG/DL (ref 70–108)
HCT VFR BLD CALC: 28.6 % (ref 42–52)
HEMOGLOBIN: 8.7 GM/DL (ref 14–18)
IMMATURE GRANS (ABS): 0.17 THOU/MM3 (ref 0–0.07)
IMMATURE GRANULOCYTES: 2.3 %
LYMPHOCYTES # BLD: 8.1 %
LYMPHOCYTES ABSOLUTE: 0.6 THOU/MM3 (ref 1–4.8)
MACROCYTES: PRESENT
MCH RBC QN AUTO: 35.1 PG (ref 26–33)
MCHC RBC AUTO-ENTMCNC: 30.4 GM/DL (ref 32.2–35.5)
MCV RBC AUTO: 115.3 FL (ref 80–94)
MONOCYTES # BLD: 11.3 %
MONOCYTES ABSOLUTE: 0.8 THOU/MM3 (ref 0.4–1.3)
NUCLEATED RED BLOOD CELLS: 0 /100 WBC
PLATELET # BLD: 108 THOU/MM3 (ref 130–400)
PLATELET ESTIMATE: ABNORMAL
PMV BLD AUTO: 9.6 FL (ref 9.4–12.4)
POTASSIUM SERPL-SCNC: 4.5 MEQ/L (ref 3.5–5.2)
RBC # BLD: 2.48 MILL/MM3 (ref 4.7–6.1)
SCAN OF BLOOD SMEAR: NORMAL
SEG NEUTROPHILS: 76.8 %
SEGMENTED NEUTROPHILS ABSOLUTE COUNT: 5.6 THOU/MM3 (ref 1.8–7.7)
SODIUM BLD-SCNC: 136 MEQ/L (ref 135–145)
TOTAL PROTEIN: 5.8 G/DL (ref 6.1–8)
WBC # BLD: 7.3 THOU/MM3 (ref 4.8–10.8)

## 2020-08-21 PROCEDURE — 85025 COMPLETE CBC W/AUTO DIFF WBC: CPT

## 2020-08-21 PROCEDURE — 80053 COMPREHEN METABOLIC PANEL: CPT

## 2020-08-21 PROCEDURE — 82248 BILIRUBIN DIRECT: CPT

## 2020-08-21 PROCEDURE — 36415 COLL VENOUS BLD VENIPUNCTURE: CPT

## 2020-09-08 ENCOUNTER — HOSPITAL ENCOUNTER (OUTPATIENT)
Dept: PHYSICAL THERAPY | Age: 55
Setting detail: THERAPIES SERIES
Discharge: HOME | End: 2020-09-08
Payer: COMMERCIAL

## 2020-10-05 ENCOUNTER — HOSPITAL ENCOUNTER (OUTPATIENT)
Dept: PHYSICAL THERAPY | Age: 55
Setting detail: THERAPIES SERIES
Discharge: HOME | End: 2020-10-05
Payer: COMMERCIAL

## 2020-10-05 PROCEDURE — 97110 THERAPEUTIC EXERCISES: CPT

## 2020-10-05 PROCEDURE — 97161 PT EVAL LOW COMPLEX 20 MIN: CPT

## 2020-10-05 NOTE — FLOWSHEET NOTE
** PLEASE SIGN, DATE AND TIME CERTIFICATION BELOW AND RETURN TO [facility] OUTPATIENT REHABILITATION (FAX #: [phone]). ATTEST/CO-SIGN IF ACCESSING VIA [employer]. THANK YOU.**    I certify that I have examined the patient below and determined that Physical Medicine and Rehabilitation service is necessary and that I approve the established plan of care for up to 90 days or as specifically noted.   Attestation, signature or co-signature of physician indicates approval of certification requirements.    ________________________ ____________ __________  Physician Signature   Date   Time   [address] [facility]  PHYSICAL THERAPY  [x] EVALUATION  [] DAILY NOTE (LAND) [] DAILY NOTE (AQUATIC ) [] PROGRESS NOTE [] DISCHARGE NOTE    [] [facility]   [x] [place] [address]    [] [facility]   [] [facility]    Date: 10/5/2020  Patient Name:  Madelin Young  : 1965  MRN: [id number]    Referring Practitioner [facility], NP , Dr. Bunny Cloud    Treatment Diagnosis B LE weakness, difficulty walking, decreased balance   Date of Evaluation 10/5/20   Additional Pertinent History 2018 lumbar surgery, 2 ACL reconstructions R  ,, 1 knee scope L knee , neck surgery , B feet surgery       Functional Outcome Measure Used Tinetti balance test   Functional Outcome Score 16/28 (10/5/20)       Insurance: Primary: Payor: [address] [place] [address]  [address] /  /  / ,   Secondary:    Authorization Information: Pays at 100%, modalities covered except ionto/MHP/CP not covered   Visit # 1, 1/10 for progress note   Visits Allowed: 30 visits   Recertification Date: 4041   Physician Follow-Up: F/u with 10/22     Physician Orders: PT   History of Present Illness: See below     SUBJECTIVE: patient was diagnosed with acute myeloid leukemia diagnosed in 2019 at [facility], chemo daily for a month there. Then doing chemo monthly but then stopped working. Had stem cell transplant Nov 2019, oldest son with donor. Patient reports leukemia in remission, last IV chemo was in July. Currently going to [facility] . PT c/o weakness in B LE,difficulty walking going up steps, walking limited to 5 minutes, difficulty going sit to stand, difficulty walking on uneven surface with stones/grass. Social/Functional History and Current Status:  Medications and Allergies have been reviewed and are listed on Medical History Questionnaire. Edwardo Cuenca lives alone in a single story home with stairs and a handrail to enter. 3 CHIRAG with 1 HR with difficulty on set    Task Previous Current   ADLs  Independent- difficulty with bending over to put socks and shoes on Modified Independent   IADL's Independent Modified Independent - difficulty carrying laundry basket, difficulty carrying groceries   Ambulation Independent Modified Independent  Walking limited to 5 minutes   Transfers Independent Modified Independent with difficulty sit to stand,    Recreation Independent Dependent/Unable   Community Integration Independent Dependent/Unable   Driving Active  Active    Work [employer].   Occupation: full time at [address] [place] [address]- heavy lifting, standing 12 hours per day, 6 days per week  On Disability       OBJECTIVE:  Pain Intermittent LBP 6/10,  B hip/thigh pain with steps 3/10, B foot pain with cramping 4/10, neck pain 5/10, B elbow pain 3/10, B thumb pain with gout   Palpation    Observation    Posture fair        Range of Motion Back: lumbar flexion and extension 25% limited, lateral flexion limited by 25%  Hip: R hip flexion 50, hip abduction 10, L hip [id number], abduction 10  Knee: knee R 0- 130 degrees, L knee 0- 125 degrees  Ankle: ankle DF 5 degrees   Strength Right Lower Extremity:  Impaired - R hip 4-/5, knee 4/5, ankle 4/5  Left Lower Extremity:   Impaired - L hip flexion 4-/5, knee 4/5, ankle steps with no difficulty  2. I with HEP to improve Tinetti score 24/28 to allow patient to     Patient Education:   Ashley Sebastian [x]  HEP/Education Completed: Plan of Care, Goals,  Handout of all above exercises  []  No new Education completed  []  Reviewed Prior HEP      []  Patient verbalized and/or demonstrated understanding of education provided. []  Patient unable to verbalize and/or demonstrate understanding of education provided. Will continue education. [x]  Barriers to learning: NA  PLAN:  Treatment Recommendations: Strengthening, Range of Motion, Balance Training, Endurance Training, Gait Training, Stair Training, Home Exercise Program and Patient Education    [x]  Plan of care initiated. Plan to see patient 2 times per week for 5 weeks to address the treatment planned outlined above.   []  Continue with current plan of care  []  Modify plan of care as follows:    []  Hold pending physician visit  []  Discharge    Time In 1140   Time Out 1230   Timed Code Minutes: 25min   Total Treatment Time: 50 min       Electronically Signed by: Elaine Palmer

## 2020-10-09 ENCOUNTER — HOSPITAL ENCOUNTER (OUTPATIENT)
Dept: PHYSICAL THERAPY | Age: 55
Setting detail: THERAPIES SERIES
Discharge: HOME | End: 2020-10-09
Payer: COMMERCIAL

## 2020-10-13 ENCOUNTER — HOSPITAL ENCOUNTER (OUTPATIENT)
Dept: PHYSICAL THERAPY | Age: 55
Setting detail: THERAPIES SERIES
Discharge: HOME | End: 2020-10-13
Payer: COMMERCIAL

## 2020-10-13 PROCEDURE — 97110 THERAPEUTIC EXERCISES: CPT

## 2020-10-13 NOTE — FLOWSHEET NOTE
[address] [facility]  PHYSICAL THERAPY    [x] DAILY NOTE (LAND) [] DAILY NOTE (AQUATIC ) [] PROGRESS NOTE [] DISCHARGE NOTE    [] [facility]   [x] [place] [address]    [] [facility]   [] Lucas Richmond    Date: 10/13/2020  Patient Name:  Rachael Muniz  : 1965  MRN: [id number]    Referring Practitioner Krystal Garcia NP , Dr. Emmanuel Nurse    Treatment Diagnosis B LE weakness, difficulty walking, decreased balance   Date of Evaluation 10/5/20   Additional Pertinent History 2018 lumbar surgery, 2 ACL reconstructions R  ,, 1 knee scope L knee , neck surgery , B feet surgery       Functional Outcome Measure Used Tinetti balance test   Functional Outcome Score  (10/5/20)       Insurance: Primary: Payor: [address] [place] [address]  [address] /  /  / ,   Secondary:    Authorization Information: Pays at 100%, modalities covered except ionto/MHP/CP not covered   Visit # 2, 2/10 for progress note   Visits Allowed: 30 visits   Recertification Date: 8474   Physician Follow-Up: F/u with 10/22     Physician Orders: PT   History of Present Illness: See below     SUBJECTIVE: reports with medications, chemo, just feeling kind of not good. No pain today.   5 minutes late                TREATMENT   Precautions: Acute leukemia in remission   Pain: 0/10 currently    X in shaded column indicates activity completed today   Modalities Parameters/  Location  Notes                     Manual Therapy Time/Technique  Notes                     Exercise/Intervention   Notes   NuStep seat 5 holes, UE 10 4 minutes, level 1  x           B ankle pump 15  x    B quad set  15 5 x    Heel slide R then L 15  x    DF belt stretch blue belt R then L 5 10 x    LAQ R then L 10 5 x    SLR R then L  10 5 x    SAQ 15 5 x                                  Specific Interventions Next Treatment: NuStep/bike, AROM, Minutes: 25min   Total Treatment Time: 25 min       Electronically Signed by: Charisma Brown

## 2020-10-15 ENCOUNTER — HOSPITAL ENCOUNTER (OUTPATIENT)
Dept: PHYSICAL THERAPY | Age: 55
Setting detail: THERAPIES SERIES
Discharge: HOME | End: 2020-10-15
Payer: COMMERCIAL

## 2020-10-15 PROCEDURE — 97110 THERAPEUTIC EXERCISES: CPT

## 2020-10-15 NOTE — FLOWSHEET NOTE
[address] [facility]  PHYSICAL THERAPY    [x] DAILY NOTE (LAND) [] DAILY NOTE (AQUATIC ) [] PROGRESS NOTE [] DISCHARGE NOTE    [] 615 [facility]   [x] [place] [address]    [] [facility]   [] Gianna Page    Date: 10/15/2020  Patient Name:  Sudha Carter  : 1965  MRN: [id number]    Referring Practitioner Gina Zacarias NP , Dr. Jeff Shahid    Treatment Diagnosis B LE weakness, difficulty walking, decreased balance   Date of Evaluation 10/5/20   Additional Pertinent History 2018 lumbar surgery, 2 ACL reconstructions R  ,, 1 knee scope L knee , neck surgery , B feet surgery       Functional Outcome Measure Used Tinetti balance test   Functional Outcome Score  (10/5/20)       Insurance: Primary: Payor: [address] [place] [address]  [address] /  /  / ,   Secondary:    Authorization Information: Pays at 100%, modalities covered except ionto/MHP/CP not covered   Visit # 2, 2/10 for progress note   Visits Allowed: 30 visits   Recertification Date: 5813   Physician Follow-Up: F/u with 10/22 at [facility]     Physician Orders: PT   History of Present Illness: See below     SUBJECTIVE: patient 8 minutes late but able to be seen x 25 minutes. Reports running behind today.   Reports changed meds as decreased prednisone and felt bad so had to go back up                TREATMENT   Precautions: Acute leukemia in remission   Pain: 0/10 currently    X in shaded column indicates activity completed today   Modalities Parameters/  Location  Notes                     Manual Therapy Time/Technique  Notes                     Exercise/Intervention   Notes   NuStep seat 5 holes, UE 10 5 minutes, level 1  x           B ankle pump 15  x    B quad set  15 5 x    Heel slide R then L 15  x    DF belt stretch blue belt R then L 5 10 x    LAQ R then L 15 5 x    SLR R then L  15 5 x    SAQ 15 5 x    Total gym level 8 B squat  15  x                           Specific Interventions Next Treatment: NuStep/bike, AROM, stretching, strengthening, standing , balance, total gym    Activity/Treatment Tolerance:  [x]  Patient tolerated treatment well  []  Patient limited by fatigue  []  Patient limited by pain   []  Patient limited by medical complications  []  Other:     Assessment: increased time on NuStep today, increased reps with SLR and LAQ without increased pain  Body Structures/Functions/Activity Limitations: impaired balance, impaired ROM, impaired strength and abnormal gait  Prognosis: good    GOALS:  Patient Goal: to get stronger    Short Term Goals:  Time Frame: 5 weeks  1. Increase AROM B hip flexion 70, knee flexion to 130, DF to 10 degrees to allow patient to report able to walk x 10 minutes at grocery store  2. Increase strength B hip to 4/5 to allow patient to report able to walk 5 minutes in yard without LOB  3. I with HEP as prescribed to allow improved balance Tinetti score 20/28 with no LOB    Long Term Goals:  Time Frame: 12 weeks  1. Increase strength B LE to 4+/5 to allow patient to go up and down steps with no difficulty  2. I with HEP to improve Tinetti score 24/28 to allow patient to reports no LOB with walking in grass    Patient Education:    [x]  HEP/Education Completed: increased reps SLR and LAQ  []  No new Education completed  []  Reviewed Prior HEP      []  Patient verbalized and/or demonstrated understanding of education provided. []  Patient unable to verbalize and/or demonstrate understanding of education provided. Will continue education. [x]  Barriers to learning: NA  PLAN:  Treatment Recommendations: Strengthening, Range of Motion, Balance Training, Endurance Training, Gait Training, Stair Training, Home Exercise Program and Patient Education   Plan to see patient 2 times per week for 5 weeks to address the treatment planned outlined above.   [x]  Continue with current plan of care  [] Modify plan of care as follows:    []  Hold pending physician visit  []  Discharge    Time In 1138   Time Out 1203   Timed Code Minutes: 25min   Total Treatment Time: 25 min       Electronically Signed by: Toni Huynh

## 2020-10-20 ENCOUNTER — HOSPITAL ENCOUNTER (OUTPATIENT)
Dept: PHYSICAL THERAPY | Age: 55
Setting detail: THERAPIES SERIES
Discharge: HOME | End: 2020-10-20
Payer: COMMERCIAL

## 2020-10-20 NOTE — PROGRESS NOTES
[address]  PHYSICAL THERAPY MISSED TREATMENT NOTE  [facility]GLESI[facility] PT    Date: 10/20/2020  Patient Name: Abraham Grey        MRN: [id number]   : 1965  (60 y.o.)  Gender: male                REASON FOR MISSED TREATMENT:  Cancelled due to illness. PT spoke with patient who had stuffy nose, sounds ill, denies fever. PT did advise patient to call MD as he is still having treatments. Patient also reports he is going to [address] [place] [address] to MD on 10/22. Whit Davis # [phone]

## 2020-10-23 ENCOUNTER — HOSPITAL ENCOUNTER (OUTPATIENT)
Dept: PHYSICAL THERAPY | Age: 55
Setting detail: THERAPIES SERIES
Discharge: HOME | End: 2020-10-23
Payer: COMMERCIAL

## 2020-10-27 ENCOUNTER — HOSPITAL ENCOUNTER (OUTPATIENT)
Dept: PHYSICAL THERAPY | Age: 55
Setting detail: THERAPIES SERIES
Discharge: HOME | End: 2020-10-27
Payer: COMMERCIAL

## 2020-10-27 PROCEDURE — 97110 THERAPEUTIC EXERCISES: CPT

## 2020-10-27 NOTE — FLOWSHEET NOTE
[facility]    [x] DAILY NOTE (LAND) [] DAILY NOTE (AQUATIC ) [] PROGRESS NOTE [] DISCHARGE NOTE    [] 615 [facility]   [x] [place] [address]    [] [facility]   [] Sisi Ridley    Date: 10/27/2020  Patient Name:  Stanley Zavala  : 1965  MRN: [id number]    Referring Practitioner Herman Powers NP , Dr. Phillip Like    Treatment Diagnosis B LE weakness, difficulty walking, decreased balance   Date of Evaluation 10/5/20   Additional Pertinent History 2018 lumbar surgery, 2 ACL reconstructions R  ,, 1 knee scope L knee , neck surgery , B feet surgery       Functional Outcome Measure Used Tinetti balance test   Functional Outcome Score  (10/5/20)       Insurance: Primary: Payor: [address] [place] [address]  [address] /  /  / ,   Secondary:    Authorization Information: Pays at 100%, modalities covered except ionto/MHP/CP not covered   Visit # 2, 2/10 for progress note   Visits Allowed: 30 visits   Recertification Date: 3/1/5750   Physician Follow-Up: F/u with 10/22 at [facility]     Physician Orders: PT   History of Present Illness: See below     SUBJECTIVE: patient 8 minutes late but able to be seen x 25 minutes. Reports running behind today.   Reports changed meds as decreased prednisone and felt bad so had to go back up                TREATMENT   Precautions: Acute leukemia in remission   Pain: 0/10 currently    X in shaded column indicates activity completed today   Modalities Parameters/  Location  Notes                     Manual Therapy Time/Technique  Notes                     Exercise/Intervention   Notes   NuStep seat 5 holes, UE 10 5 minutes, level 2  x           B ankle pump 20  x    B quad set  20 5 x    Heel slide R then L 20  x    DF belt stretch blue belt R then L 5 10 x    LAQ R then L 2 x 10 5 x    SLR R then L  2 x 10 5 x    Step up forward and lateral 4 inch 15 5     Total gym level 8 B squat  20  x                           Specific Interventions Next Treatment: NuStep/bike, AROM, stretching, strengthening, standing , balance, total gym    Activity/Treatment Tolerance:  [x]  Patient tolerated treatment well  []  Patient limited by fatigue  []  Patient limited by pain   []  Patient limited by medical complications  []  Other:     Assessment:increased reps, resistance on NuStep and added step up  Body Structures/Functions/Activity Limitations: impaired balance, impaired ROM, impaired strength and abnormal gait  Prognosis: good    GOALS:  Patient Goal: to get stronger    Short Term Goals:  Time Frame: 5 weeks  1. Increase AROM B hip flexion 70, knee flexion to 130, DF to 10 degrees to allow patient to report able to walk x 10 minutes at grocery store  2. Increase strength B hip to 4/5 to allow patient to report able to walk 5 minutes in yard without LOB  3. I with HEP as prescribed to allow improved balance Tinetti score 20/28 with no LOB    Long Term Goals:  Time Frame: 12 weeks  1. Increase strength B LE to 4+/5 to allow patient to go up and down steps with no difficulty  2. I with HEP to improve Tinetti score 24/28 to allow patient to reports no LOB with walking in grass    Patient Education:    [x]  HEP/Education Completed: increased reps SLR and LAQ  []  No new Education completed  []  Reviewed Prior HEP      []  Patient verbalized and/or demonstrated understanding of education provided. []  Patient unable to verbalize and/or demonstrate understanding of education provided. Will continue education. [x]  Barriers to learning: NA  PLAN:  Treatment Recommendations: Strengthening, Range of Motion, Balance Training, Endurance Training, Gait Training, Stair Training, Home Exercise Program and Patient Education   Plan to see patient 2 times per week for 5 weeks to address the treatment planned outlined above.   [x]  Continue with current plan of care  []

## 2020-10-29 ENCOUNTER — HOSPITAL ENCOUNTER (OUTPATIENT)
Dept: PHYSICAL THERAPY | Age: 55
Setting detail: THERAPIES SERIES
Discharge: HOME | End: 2020-10-29
Payer: COMMERCIAL

## 2020-11-03 ENCOUNTER — HOSPITAL ENCOUNTER (OUTPATIENT)
Dept: PHYSICAL THERAPY | Age: 55
Setting detail: THERAPIES SERIES
Discharge: HOME | End: 2020-11-03
Payer: COMMERCIAL

## 2020-11-03 PROCEDURE — 97110 THERAPEUTIC EXERCISES: CPT

## 2020-11-03 NOTE — FLOWSHEET NOTE
[address] [facility]  PHYSICAL THERAPY    [x] DAILY NOTE (LAND) [] DAILY NOTE (AQUATIC ) [] PROGRESS NOTE [] DISCHARGE NOTE    [] 615 [facility]   [x] [place] [address]    [] [facility]   [] Mani Amabile    Date: 11/3/2020  Patient Name:  Claire Lindsey  : 1965  MRN: [id number]    Referring Practitioner Leora Ramon NP , Dr. Valerie Braswell    Treatment Diagnosis B LE weakness, difficulty walking, decreased balance   Date of Evaluation 10/5/20   Additional Pertinent History 2018 lumbar surgery, 2 ACL reconstructions R  ,, 1 knee scope L knee , neck surgery , B feet surgery       Functional Outcome Measure Used Tinetti balance test   Functional Outcome Score  (10/5/20)       Insurance: Primary: Payor: [address] [place] [address] [address] /  /  / ,   Secondary:    Authorization Information: Pays at 100%, modalities covered except ionto/MHP/CP not covered   Visit # 6, 6/10 for progress note   Visits Allowed: 30 visits   Recertification Date: 1532   Physician Follow-Up: F/u with Dec 2020 at [facility]     Physician Orders: PT   History of Present Illness: See below     SUBJECTIVE: patient again late, PT able to see due to cancellation, reviewed time and date and need to be in time              TREATMENT   Precautions: Acute leukemia in remission   Pain: 0/10 currently    X in shaded column indicates activity completed today   Modalities Parameters/  Location  Notes                     Manual Therapy Time/Technique  Notes                     Exercise/Intervention   Notes   NuStep seat 5 holes, UE 10 5 minutes, level 2  x           B ankle pump 20      B quad set  20 5     Heel slide R then L 20      Prone SLR 10 5 x    sidelying SLR 10 5 x    SLR R then L  20 5 x    Step up forward and lateral 4 inch 15 5     Total gym level 8 B squat  20  x                           Specific Interventions Next Treatment: NuStep/bike, AROM, stretching, strengthening, standing , balance, total gym    Activity/Treatment Tolerance:  [x]  Patient tolerated treatment well  []  Patient limited by fatigue  []  Patient limited by pain   []  Patient limited by medical complications  []  Other:     Assessment:increased reps, resistance on NuStep and added step up  Body Structures/Functions/Activity Limitations: impaired balance, impaired ROM, impaired strength and abnormal gait  Prognosis: good    GOALS:  Patient Goal: to get stronger    Short Term Goals:  Time Frame: 5 weeks  1. Increase AROM B hip flexion 70, knee flexion to 130, DF to 10 degrees to allow patient to report able to walk x 10 minutes at grocery store  2. Increase strength B hip to 4/5 to allow patient to report able to walk 5 minutes in yard without LOB  3. I with HEP as prescribed to allow improved balance Tinetti score 20/28 with no LOB    Long Term Goals:  Time Frame: 12 weeks  1. Increase strength B LE to 4+/5 to allow patient to go up and down steps with no difficulty  2. I with HEP to improve Tinetti score 24/28 to allow patient to reports no LOB with walking in grass    Patient Education:    [x]  HEP/Education Completed: ihandout for sidelying hip abduction, prone SLR  []  No new Education completed  []  Reviewed Prior HEP      []  Patient verbalized and/or demonstrated understanding of education provided. []  Patient unable to verbalize and/or demonstrate understanding of education provided. Will continue education. [x]  Barriers to learning: NA  PLAN:  Treatment Recommendations: Strengthening, Range of Motion, Balance Training, Endurance Training, Gait Training, Stair Training, Home Exercise Program and Patient Education   Plan to see patient 2 times per week for 5 weeks to address the treatment planned outlined above.   [x]  Continue with current plan of care  []  Modify plan of care as follows:    []  Hold pending physician visit  [] Discharge    Time In 1040   Time Out 1120   Timed Code Minutes:  30 min   Total Treatment Time: 30 min       Electronically Signed by: Ladonna Spencer

## 2020-11-04 NOTE — PROGRESS NOTES
[address] [place] [address] [address], [address]  Phone:  [phone]  Fax:  [phone]      Cheng Woody       Name: Wes Larios  : 1965          Chief Complaint:     Chief Complaint   Patient presents with    New Patient       History of Present Illness:      Wes Larios is a 60 y.o.  male who presents today to establish as a new patient for a health maintenance examination. 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       Health Maintenance  Smoker?:  No  Healthy diet?:  Tries but a lot of food doesn't taste good bc of his chemo  Routine exercise?:  Doing therapy (has some weakness from his treatment)  Routine dental exams?:  Yes  Routine eye exams?:  Yes  Last colon cancer screening:  Colonoscopy 2016      Past Medical History:     Past Medical History:   Diagnosis Date    Depression     Erectile dysfunction     Fatigue     Hyperlipidemia     Hypertension     Leukemia ([address]     Seasonal allergies     Xeroderma         Past Surgical History:     Past Surgical History:   Procedure Laterality Date    COLONOSCOPY  2016    Excision of Anal Skin Tag - Dr. Zachery Kelly  2006    bilateral feet    HERNIA REPAIR Right 2004    excision right groin skin tag, escision of penile lesion--Dr. Sonja Palacios KNEE SURGERY  2010    right knee    KNEE SURGERY      2 times    NECK SURGERY      fusion    [place] SPINE SURGERY PROCEDURE UNLISTED N/A 6/15/2018    L4-5 DECOMPRESSION, L4-5 POSTERIOR FUSION performed by Jennifer Clifford MD at [address]      cervical fusion   Juanice Corolla TONSILLECTOMY      TOOTH EXTRACTION Left 5-1-15    [facility]      Dr. Kashif Dewey        Medications:       Outpatient Medications Prior to Visit   Medication Sig Dispense Refill    ruxolitinib phosphate (JAKAFI) 10 MG tablet Take 10 mg by mouth 2 times daily      carvedilol (COREG) 25 MG tablet Take 25 mg by mouth 2 times daily      omeprazole (PRILOSEC) 40 MG delayed release capsule take 1 capsule by mouth once daily if needed FOR STOMACH ACID. 90 capsule 3    fluconazole (DIFLUCAN) 200 MG tablet Take 400 mg by mouth daily       acyclovir (ZOVIRAX) 800 MG tablet Take 800 mg by mouth 2 times daily      predniSONE (DELTASONE) 5 MG tablet Take 5 mg by mouth daily       dapsone 100 MG tablet Take 100 mg by mouth daily      allopurinol (ZYLOPRIM) 300 MG tablet Take 300 mg by mouth daily      valGANciclovir (VALCYTE) 450 MG tablet Take 450 mg by mouth 2 times daily      enoxaparin (LOVENOX) 40 MG/0.4ML injection Inject 40 mg into the skin 2 times daily       LORazepam (ATIVAN) 0.5 MG tablet Take 0.5 mg by mouth every 6 hours as needed for Anxiety.  fluticasone (FLONASE) 50 MCG/ACT nasal spray by Each Nostril route daily       prochlorperazine (COMPAZINE) 5 MG tablet Take 5 mg by mouth every 8 hours as needed      lidocaine (XYLOCAINE) 2 % jelly Apply 3 times daily 85 g 5    acetaminophen (TYLENOL 8 HOUR) 650 MG extended release tablet Take 1,300 mg by mouth every 8 hours as needed        No facility-administered medications prior to visit.         Allergies       Amlodipine    Social History:     Social:          Social History     Socioeconomic History    Marital status:      Spouse name: Not on file    Number of children: Not on file    Years of education: Not on file    Highest education level: Not on file   Occupational History    Not on file   Social Needs    Financial resource strain: Not on file    Food insecurity     Worry: Not on file     Inability: Not on file   [employer] needs     Medical: Not on file     Non-medical: Not on file   Tobacco Use    Smoking status: Former Smoker     Packs/day: 0.50     Years: 25.00     Pack years: 12.50     Types: Cigarettes     Last attempt to quit: 1/1/1996     Years since quittin.8    Smokeless tobacco: Never Used   Substance and Sexual Activity    Alcohol use: Not Currently     Alcohol/week: 0.0 standard drinks     Comment: Rarely    Drug use: Yes     Types: Marijuana     Comment: sleep aid/pain relief    Sexual activity: Not Currently     Partners: Female   Lifestyle    Physical activity     Days per week: Not on file     Minutes per session: Not on file    Stress: Not on file   Relationships    Social connections     Talks on phone: Not on file     Gets together: Not on file     Attends Alevism service: Not on file     Active member of club or organization: Not on file     Attends meetings of clubs or organizations: Not on file     Relationship status: Not on file    Intimate partner violence     Fear of current or ex partner: Not on file     Emotionally abused: Not on file     Physically abused: Not on file     Forced sexual activity: Not on file   Other Topics Concern    Not on file   Social History Narrative    Not on file       Family History:     Family History   Problem Relation Age of Onset    Heart Disease Mother     Diabetes Mother     Heart Failure Mother     Cancer Father         Prostate/Bladder    Diabetes Brother     Heart Disease Brother     Cancer Brother         Prostate    Cancer Maternal Grandmother     Heart Disease Maternal Grandmother     Heart Disease Paternal Grandmother     Diabetes Sister        Review of Systems:     Review of Systems   Constitutional: Negative for chills and fever. HENT: Negative for congestion, rhinorrhea and sore throat. Eyes: Negative for discharge and redness. Respiratory: Negative for cough and shortness of breath. Cardiovascular: Negative for chest pain and palpitations. Gastrointestinal: Negative for blood in stool, constipation, diarrhea, nausea and vomiting. Musculoskeletal: Positive for arthralgias and myalgias. Skin: Negative for color change and rash.    Allergic/Immunologic: Negative for environmental allergies and food allergies. Neurological: Positive for weakness. Negative for syncope. Hematological: Bruises/bleeds easily. Psychiatric/Behavioral: Negative for dysphoric mood. The patient is not nervous/anxious. Physical Exam:     Vitals:  Blood pressure (!) 148/90, pulse 78, temperature 97.3 °F (36.3 °C), height 5' 10\" (1.778 m), weight 209 lb (94.8 kg), SpO2 98 %. General appearance:  Alert, well appearing, and in no acute distress. Mental status:  Oriented to person, place, and time with normal affect. Head:  Normocephalic and atraumatic. Eyes:   Extraocular eye movements intact, conjunctiva clear. Ears:  Hearing appears to be intact. Nose:  No drainage noted. Mouth:  Mucous membranes moist.  Neck:  Supple, no carotid bruits, thyroid not palpable. Heart:  Normal rate, regular rhythm, no murmurs. Lungs:  Clear to auscultation bilaterally. Respirations unlabored. Abdomen:  Soft, nondistended, bowel sounds present all four quadrants. Neurological:  Normal speech. Extremities:  Peripheral pulses palpable, no pedal edema. Skin:  No gross lesions, rashes, or induration noted. Assessment:      Diagnosis Orders   1. Encounter for general adult medical examination with abnormal findings     2. Acute myeloid leukemia in remission (HCC)     3. Elevated fasting glucose  POCT glycosylated hemoglobin (Hb A1C)   4. Family history of prostate cancer  PSA Prostatic Specific Antigen       Plan:     Orders Placed This Encounter   Procedures    PSA Prostatic Specific Antigen     Standing Status:   Future     Standing Expiration Date:   11/5/2021    POCT glycosylated hemoglobin (Hb A1C)       Counseling Completed:  Tobacco and secondary smoke risks reviewed; instructed on cessation and avoidance. Colon cancer screening reviewed age > 48, or < if indicated. Labs ordered, if indicated. Influenza vaccine recommended, if in season.   Pneumonia vaccination if > 65 or indicated. Routine eye and dental exams advised. Exercise and healthy diet discussed. Return if symptoms worsen or fail to improve.     Electronically signed by Nithin Marc MD on 11/5/2020 at 12:05 PM

## 2020-11-05 ENCOUNTER — OFFICE VISIT (OUTPATIENT)
Dept: FAMILY MEDICINE CLINIC | Age: 55
End: 2020-11-05
Payer: COMMERCIAL

## 2020-11-05 ENCOUNTER — HOSPITAL ENCOUNTER (OUTPATIENT)
Dept: PHYSICAL THERAPY | Age: 55
Setting detail: THERAPIES SERIES
Discharge: HOME | End: 2020-11-05
Payer: COMMERCIAL

## 2020-11-05 ENCOUNTER — HOSPITAL ENCOUNTER (OUTPATIENT)
Age: 55
Discharge: HOME | End: 2020-11-05
Payer: COMMERCIAL

## 2020-11-05 VITALS
BODY MASS INDEX: 29.92 KG/M2 | DIASTOLIC BLOOD PRESSURE: 90 MMHG | HEIGHT: 70 IN | OXYGEN SATURATION: 98 % | TEMPERATURE: 97.3 F | WEIGHT: 209 LBS | SYSTOLIC BLOOD PRESSURE: 148 MMHG | HEART RATE: 78 BPM

## 2020-11-05 DIAGNOSIS — Z80.42 FAMILY HISTORY OF PROSTATE CANCER: ICD-10-CM

## 2020-11-05 LAB — HBA1C MFR BLD: 5.6 %

## 2020-11-05 PROCEDURE — 99396 PREV VISIT EST AGE 40-64: CPT | Performed by: FAMILY MEDICINE

## 2020-11-05 PROCEDURE — 36415 COLL VENOUS BLD VENIPUNCTURE: CPT

## 2020-11-05 PROCEDURE — 83036 HEMOGLOBIN GLYCOSYLATED A1C: CPT | Performed by: FAMILY MEDICINE

## 2020-11-05 PROCEDURE — G8484 FLU IMMUNIZE NO ADMIN: HCPCS | Performed by: FAMILY MEDICINE

## 2020-11-05 PROCEDURE — 84153 ASSAY OF PSA TOTAL: CPT

## 2020-11-05 PROCEDURE — 97110 THERAPEUTIC EXERCISES: CPT

## 2020-11-05 ASSESSMENT — ENCOUNTER SYMPTOMS
SHORTNESS OF BREATH: 0
EYE REDNESS: 0
CONSTIPATION: 0
DIARRHEA: 0
VOMITING: 0
SORE THROAT: 0
BLOOD IN STOOL: 0
COLOR CHANGE: 0
EYE DISCHARGE: 0
COUGH: 0
NAUSEA: 0
RHINORRHEA: 0

## 2020-11-05 NOTE — PROGRESS NOTES
[address]  PHYSICAL THERAPY    [x] DAILY NOTE (LAND) [] DAILY NOTE (AQUATIC ) [] PROGRESS NOTE [] DISCHARGE NOTE    [] [facility]   [x] Brett [address]    [] [facility]   [] Alexia Opitz    Date: 2020  Patient Name:  Blas Bee  : 1965  MRN: [id number]    Referring Practitioner Riddhi Sheth NP , Dr. Mo Kohler    Treatment Diagnosis B LE weakness, difficulty walking, decreased balance   Date of Evaluation 10/5/20   Additional Pertinent History 2018 lumbar surgery, 2 ACL reconstructions R  ,, 1 knee scope L knee , neck surgery , B feet surgery       Functional Outcome Measure Used Tinetti balance test   Functional Outcome Score  (10/5/20)       Insurance: Primary: Payor: [address] [place] [address]  [address] /  /  / ,   Secondary:    Authorization Information: Pays at 100%, modalities covered except ionto/MHP/CP not covered   Visit # 7, 7/10 for progress note   Visits Allowed: 30 visits   Recertification Date: 1/3/6522   Physician Follow-Up: F/u with Dec 2020 at [facility]     Physician Orders: PT   History of Present Illness: See below     SUBJECTIVE:   Patient reports no pain , is still taking 5 mg prednisone until next f/u with MD in December at [address] [place] [address]   Precautions: Acute leukemia in remission   Pain: 0/10 currently    X in shaded column indicates activity completed today   Modalities Parameters/  Location  Notes                     Manual Therapy Time/Technique  Notes                     Exercise/Intervention   Notes   NuStep seat 5 holes, UE 10 5 minutes, level 4  x           B ankle pump 20      B quad set  20 5     Heel slide R then L 20      Prone SLR 10 5 x    sidelying SLR 15 5 x    SLR R then L  15 5 x    Step up forward and lateral 4 inch 15 5     rockerbaord forward/back, side/side 15  x    Total gym level 8 B squat  20  x Discharge    Time In 1035   Time Out 1100   Timed Code Minutes:  25 min   Total Treatment Time: 25 min       Electronically Signed by: Vini Kendrick

## 2020-11-06 ENCOUNTER — TELEPHONE (OUTPATIENT)
Dept: FAMILY MEDICINE CLINIC | Age: 55
End: 2020-11-06

## 2020-11-06 LAB — PROSTATE SPECIFIC ANTIGEN: 0.96 NG/ML (ref 0–1)

## 2020-11-10 ENCOUNTER — HOSPITAL ENCOUNTER (OUTPATIENT)
Dept: PHYSICAL THERAPY | Age: 55
Setting detail: THERAPIES SERIES
Discharge: HOME | End: 2020-11-10
Payer: COMMERCIAL

## 2020-11-12 ENCOUNTER — HOSPITAL ENCOUNTER (OUTPATIENT)
Dept: PHYSICAL THERAPY | Age: 55
Setting detail: THERAPIES SERIES
Discharge: HOME | End: 2020-11-12
Payer: COMMERCIAL

## 2020-11-12 PROCEDURE — 97110 THERAPEUTIC EXERCISES: CPT

## 2020-11-12 NOTE — PROGRESS NOTES
[address] [facility]  PHYSICAL THERAPY    [x] DAILY NOTE (LAND) [] DAILY NOTE (AQUATIC ) [] PROGRESS NOTE [] DISCHARGE NOTE    [] [facility]   [x] [address]    [] [facility]   [] [facility]    Date: 2020  Patient Name:  Segundo Vivas  : 1965  MRN: [id number]    Referring Practitioner Concepción Echols NP , Dr. Maryellen Parker    Treatment Diagnosis B LE weakness, difficulty walking, decreased balance   Date of Evaluation 10/5/20   Additional Pertinent History 2018 lumbar surgery, 2 ACL reconstructions R  ,, 1 knee scope L knee , neck surgery , B feet surgery       Functional Outcome Measure Used Tinetti balance test   Functional Outcome Score  (10/5/20)       Insurance: Primary: Payor: [address] [place] [address]  [address] /  /  / ,   Secondary:    Authorization Information: Pays at 100%, modalities covered except ionto/MHP/CP not covered   Visit # 7, 7/10 for progress note   Visits Allowed: 30 visits   Recertification Date: 6960   Physician Follow-Up: F/u with Dec 2020 at [facility]     Physician Orders: PT   History of Present Illness: See below     SUBJECTIVE:  Has had on and off nausea with oral meds this week, no fever or other s/s              TREATMENT   Precautions: Acute leukemia in remission   Pain: 0/10 currently    X in shaded column indicates activity completed today   Modalities Parameters/  Location  Notes                     Manual Therapy Time/Technique  Notes                     Exercise/Intervention   Notes   NuStep seat 5 holes, UE 10 5 minutes, level 5  x           B ankle pump 20      B quad set  20 5     Heel slide R then L 20      Prone SLR 10 5 x    sidelying SLR 15 5 x    SLR R then L  15 5 x    Step up forward and lateral 4 inch 20 5     rockerbaord forward/back, side/side 20  x    Total gym level 8 B squat  25  x Specific Interventions Next Treatment: NuStep/bike, AROM, stretching, strengthening, standing , balance, total gym    Activity/Treatment Tolerance:  [x]  Patient tolerated treatment well  []  Patient limited by fatigue  []  Patient limited by pain   []  Patient limited by medical complications  []  Other:     Assessment:  Increased resistance again on NuStep  Body Structures/Functions/Activity Limitations: impaired balance, impaired ROM, impaired strength and abnormal gait  Prognosis: good    GOALS:  Patient Goal: to get stronger    Short Term Goals:  Time Frame: 5 weeks  1. Increase AROM B hip flexion 70, knee flexion to 130, DF to 10 degrees to allow patient to report able to walk x 10 minutes at grocery store  2. Increase strength B hip to 4/5 to allow patient to report able to walk 5 minutes in yard without LOB  3. I with HEP as prescribed to allow improved balance Tinetti score 20/28 with no LOB    Long Term Goals:  Time Frame: 12 weeks  1. Increase strength B LE to 4+/5 to allow patient to go up and down steps with no difficulty  2. I with HEP to improve Tinetti score 24/28 to allow patient to reports no LOB with walking in grass    Patient Education:   Doris Ku [x]  HEP/Education Completed: reviewed HEP daily  []  No new Education completed  []  Reviewed Prior HEP      []  Patient verbalized and/or demonstrated understanding of education provided. []  Patient unable to verbalize and/or demonstrate understanding of education provided. Will continue education. [x]  Barriers to learning: NA  PLAN:  Treatment Recommendations: Strengthening, Range of Motion, Balance Training, Endurance Training, Gait Training, Stair Training, Home Exercise Program and Patient Education   Plan to see patient 2 times per week for 5 weeks to address the treatment planned outlined above.   [x]  Continue with current plan of care  []  Modify plan of care as follows:    []  Hold pending physician visit  []  Discharge    Time In 21 [phone] Time Out 1100   Timed Code Minutes:  30 min   Total Treatment Time: 30 min       Electronically Signed by: Nelsy Domingo

## 2020-11-17 ENCOUNTER — HOSPITAL ENCOUNTER (OUTPATIENT)
Dept: PHYSICAL THERAPY | Age: 55
Setting detail: THERAPIES SERIES
Discharge: HOME | End: 2020-11-17
Payer: COMMERCIAL

## 2020-11-19 ENCOUNTER — HOSPITAL ENCOUNTER (OUTPATIENT)
Dept: PHYSICAL THERAPY | Age: 55
Setting detail: THERAPIES SERIES
Discharge: HOME | End: 2020-11-19
Payer: COMMERCIAL

## 2020-11-19 ENCOUNTER — HOSPITAL ENCOUNTER (OUTPATIENT)
Age: 55
Discharge: HOME | DRG: 137 | End: 2020-11-19
Payer: COMMERCIAL

## 2020-11-19 PROCEDURE — 36415 COLL VENOUS BLD VENIPUNCTURE: CPT

## 2020-11-20 ENCOUNTER — APPOINTMENT (OUTPATIENT)
Dept: CT IMAGING | Age: 55
DRG: 137 | End: 2020-11-20
Payer: COMMERCIAL

## 2020-11-20 ENCOUNTER — APPOINTMENT (OUTPATIENT)
Dept: GENERAL RADIOLOGY | Age: 55
DRG: 137 | End: 2020-11-20
Payer: COMMERCIAL

## 2020-11-20 ENCOUNTER — HOSPITAL ENCOUNTER (EMERGENCY)
Age: 55
Discharge: HOME | DRG: 137 | End: 2020-11-20
Attending: EMERGENCY MEDICINE
Payer: COMMERCIAL

## 2020-11-20 VITALS
OXYGEN SATURATION: 94 % | TEMPERATURE: 98.3 F | WEIGHT: 200 LBS | SYSTOLIC BLOOD PRESSURE: 117 MMHG | BODY MASS INDEX: 28.63 KG/M2 | DIASTOLIC BLOOD PRESSURE: 76 MMHG | HEART RATE: 64 BPM | RESPIRATION RATE: 18 BRPM | HEIGHT: 70 IN

## 2020-11-20 LAB
APTT: 33.4 SECONDS (ref 22–38)
BASOPHILS # BLD: 0.3 %
BASOPHILS ABSOLUTE: 0 THOU/MM3 (ref 0–0.1)
BUN, WHOLE BLOOD: 32 MG/DL (ref 8–26)
CHLORIDE, WHOLE BLOOD: 99 MEQ/L (ref 98–109)
CREAT SERPL-MCNC: 1.9 MG/DL (ref 0.5–1.2)
EKG ATRIAL RATE: 70 BPM
EKG P AXIS: 23 DEGREES
EKG P-R INTERVAL: 168 MS
EKG Q-T INTERVAL: 432 MS
EKG QRS DURATION: 88 MS
EKG QTC CALCULATION (BAZETT): 466 MS
EKG R AXIS: -34 DEGREES
EKG T AXIS: 13 DEGREES
EKG VENTRICULAR RATE: 70 BPM
EOSINOPHIL # BLD: 0 %
EOSINOPHILS ABSOLUTE: 0 THOU/MM3 (ref 0–0.4)
GFR, ESTIMATED: 39 ML/MIN/1.73M2
GLUCOSE BLD-MCNC: 140 MG/DL (ref 70–108)
GLUCOSE, WHOLE BLOOD: 113 MG/DL (ref 70–108)
HCT VFR BLD CALC: 24.4 % (ref 42–52)
HEMOGLOBIN: 8 GM/DL (ref 14–18)
IMMATURE GRANS (ABS): 0.09 THOU/MM3 (ref 0–0.07)
IMMATURE GRANULOCYTES: 2.3 %
INR BLD: 1.46 (ref 0.85–1.13)
LACTIC ACID: 1.1 MMOL/L (ref 0.5–2.2)
LYMPHOCYTES # BLD: 28.8 %
LYMPHOCYTES ABSOLUTE: 1.1 THOU/MM3 (ref 1–4.8)
MCH RBC QN AUTO: 32.9 PG (ref 27–31)
MCHC RBC AUTO-ENTMCNC: 32.8 GM/DL (ref 33–37)
MCV RBC AUTO: 100 FL (ref 80–94)
MONOCYTES # BLD: 9.2 %
MONOCYTES ABSOLUTE: 0.3 THOU/MM3 (ref 0.4–1.3)
NUCLEATED RED BLOOD CELLS: 0 /100 WBC
PDW BLD-RTO: 15.8 % (ref 11.5–14.5)
PLATELET # BLD: 104 THOU/MM3 (ref 130–400)
PMV BLD AUTO: 10.2 FL (ref 7.4–10.4)
POTASSIUM, WHOLE BLOOD: 4 MEQ/L (ref 3.5–4.9)
PRO-BNP: 286.6 PG/ML (ref 0–900)
RBC # BLD: 2.43 MILL/MM3 (ref 4.7–6.1)
SARS-COV-2, NAAT: DETECTED
SEG NEUTROPHILS: 59.4 %
SEGMENTED NEUTROPHILS ABSOLUTE COUNT: 2.2 THOU/MM3 (ref 1.8–7.7)
SODIUM BLD-SCNC: 134 MEQ/L (ref 138–146)
TOTAL CO2, WHOLE BLOOD: 22 MEQ/L (ref 23–33)
TROPONIN T: < 0.01 NG/ML
WBC # BLD: 3.7 THOU/MM3 (ref 4.8–10.8)

## 2020-11-20 PROCEDURE — 96374 THER/PROPH/DIAG INJ IV PUSH: CPT

## 2020-11-20 PROCEDURE — 70450 CT HEAD/BRAIN W/O DYE: CPT

## 2020-11-20 PROCEDURE — 96361 HYDRATE IV INFUSION ADD-ON: CPT

## 2020-11-20 PROCEDURE — 84484 ASSAY OF TROPONIN QUANT: CPT

## 2020-11-20 PROCEDURE — 2580000003 HC RX 258: Performed by: STUDENT IN AN ORGANIZED HEALTH CARE EDUCATION/TRAINING PROGRAM

## 2020-11-20 PROCEDURE — 71046 X-RAY EXAM CHEST 2 VIEWS: CPT

## 2020-11-20 PROCEDURE — U0002 COVID-19 LAB TEST NON-CDC: HCPCS

## 2020-11-20 PROCEDURE — 93005 ELECTROCARDIOGRAM TRACING: CPT | Performed by: NURSE PRACTITIONER

## 2020-11-20 PROCEDURE — 6370000000 HC RX 637 (ALT 250 FOR IP): Performed by: NURSE PRACTITIONER

## 2020-11-20 PROCEDURE — 82947 ASSAY GLUCOSE BLOOD QUANT: CPT

## 2020-11-20 PROCEDURE — 82565 ASSAY OF CREATININE: CPT

## 2020-11-20 PROCEDURE — 82948 REAGENT STRIP/BLOOD GLUCOSE: CPT

## 2020-11-20 PROCEDURE — 85730 THROMBOPLASTIN TIME PARTIAL: CPT

## 2020-11-20 PROCEDURE — 83605 ASSAY OF LACTIC ACID: CPT

## 2020-11-20 PROCEDURE — 36415 COLL VENOUS BLD VENIPUNCTURE: CPT

## 2020-11-20 PROCEDURE — 84520 ASSAY OF UREA NITROGEN: CPT

## 2020-11-20 PROCEDURE — 80051 ELECTROLYTE PANEL: CPT

## 2020-11-20 PROCEDURE — 99215 OFFICE O/P EST HI 40 MIN: CPT

## 2020-11-20 PROCEDURE — 6360000002 HC RX W HCPCS: Performed by: STUDENT IN AN ORGANIZED HEALTH CARE EDUCATION/TRAINING PROGRAM

## 2020-11-20 PROCEDURE — 99285 EMERGENCY DEPT VISIT HI MDM: CPT

## 2020-11-20 PROCEDURE — 12015 RPR F/E/E/N/L/M 7.6-12.5 CM: CPT

## 2020-11-20 PROCEDURE — 85025 COMPLETE CBC W/AUTO DIFF WBC: CPT

## 2020-11-20 PROCEDURE — 2500000003 HC RX 250 WO HCPCS

## 2020-11-20 PROCEDURE — 83880 ASSAY OF NATRIURETIC PEPTIDE: CPT

## 2020-11-20 PROCEDURE — 6370000000 HC RX 637 (ALT 250 FOR IP): Performed by: STUDENT IN AN ORGANIZED HEALTH CARE EDUCATION/TRAINING PROGRAM

## 2020-11-20 PROCEDURE — 85610 PROTHROMBIN TIME: CPT

## 2020-11-20 PROCEDURE — 6370000000 HC RX 637 (ALT 250 FOR IP)

## 2020-11-20 RX ORDER — LIDOCAINE HYDROCHLORIDE AND EPINEPHRINE 10; 10 MG/ML; UG/ML
INJECTION, SOLUTION INFILTRATION; PERINEURAL
Status: COMPLETED
Start: 2020-11-20 | End: 2020-11-20

## 2020-11-20 RX ORDER — 0.9 % SODIUM CHLORIDE 0.9 %
1000 INTRAVENOUS SOLUTION INTRAVENOUS ONCE
Status: COMPLETED | OUTPATIENT
Start: 2020-11-20 | End: 2020-11-20

## 2020-11-20 RX ORDER — ACETAMINOPHEN 325 MG/1
650 TABLET ORAL ONCE
Status: COMPLETED | OUTPATIENT
Start: 2020-11-20 | End: 2020-11-20

## 2020-11-20 RX ORDER — ONDANSETRON 4 MG/1
4 TABLET, FILM COATED ORAL 3 TIMES DAILY PRN
Qty: 15 TABLET | Refills: 0 | Status: SHIPPED | OUTPATIENT
Start: 2020-11-20

## 2020-11-20 RX ORDER — BACITRACIN, NEOMYCIN, POLYMYXIN B 400; 3.5; 5 [USP'U]/G; MG/G; [USP'U]/G
OINTMENT TOPICAL
Status: COMPLETED
Start: 2020-11-20 | End: 2020-11-20

## 2020-11-20 RX ORDER — ONDANSETRON 2 MG/ML
4 INJECTION INTRAMUSCULAR; INTRAVENOUS ONCE
Status: COMPLETED | OUTPATIENT
Start: 2020-11-20 | End: 2020-11-20

## 2020-11-20 RX ORDER — ONDANSETRON 4 MG/1
4 TABLET, ORALLY DISINTEGRATING ORAL ONCE
Status: COMPLETED | OUTPATIENT
Start: 2020-11-20 | End: 2020-11-20

## 2020-11-20 RX ADMIN — ONDANSETRON 4 MG: 2 INJECTION INTRAMUSCULAR; INTRAVENOUS at 18:24

## 2020-11-20 RX ADMIN — BACITRACIN, NEOMYCIN, POLYMYXIN B 1 G: 400; 3.5; 5 OINTMENT TOPICAL at 19:52

## 2020-11-20 RX ADMIN — LIDOCAINE HYDROCHLORIDE AND EPINEPHRINE: 10; 10 INJECTION, SOLUTION INFILTRATION; PERINEURAL at 20:08

## 2020-11-20 RX ADMIN — ONDANSETRON 4 MG: 4 TABLET, ORALLY DISINTEGRATING ORAL at 14:51

## 2020-11-20 RX ADMIN — ACETAMINOPHEN 650 MG: 325 TABLET ORAL at 19:44

## 2020-11-20 RX ADMIN — SODIUM CHLORIDE 1000 ML: 9 INJECTION, SOLUTION INTRAVENOUS at 18:22

## 2020-11-20 ASSESSMENT — PAIN DESCRIPTION - LOCATION: LOCATION: HEAD

## 2020-11-20 ASSESSMENT — PAIN DESCRIPTION - PAIN TYPE: TYPE: ACUTE PAIN

## 2020-11-20 ASSESSMENT — ENCOUNTER SYMPTOMS
DIARRHEA: 0
SHORTNESS OF BREATH: 0
NAUSEA: 1
VOMITING: 1
COUGH: 0

## 2020-11-20 ASSESSMENT — PAIN SCALES - GENERAL
PAINLEVEL_OUTOF10: 3
PAINLEVEL_OUTOF10: 3

## 2020-11-20 NOTE — ED NOTES
Pt to rm 06 per intake- transfer from [place] for further evaluation, laceration repair and possible transfer to [facility]. Pt has hx of stem cell transplant for leukemia, concern for CMV infection, pt states his test results were not back yet. Recent n/v/d. Pt had a syncopal episode PTA, large laceration to forehead, dressing in place, D&I. Appears in no acute distress, VSS. Family at bedside.      Ayah Jennings RN  11/20/20 6019

## 2020-11-20 NOTE — ED PROVIDER NOTES
[address] [facility] [address] [address] EMERGENCY DEPT  Urgent Care Encounter       CHIEF COMPLAINT       Chief Complaint   Patient presents with    Loss of Consciousness    Head Injury    Laceration       Nurses Notes reviewed and I agree except as noted in the HPI. HISTORY OF PRESENT ILLNESS   Pedro Castillo is a 60 y.o. male who presents with complaints of a head laceration after passing out on the toilet at home. Patient was sitting on the toilet and had just finished having a bowel movement. He became the and felt like he was going to pass out. He did grab for the tub and then the next thing he remembers is waking up on the bathroom floor. Patient does report pain to the laceration area and continued dizziness when he tries to get up. Patient has a history of leukemia and underwent a stem cell transplant approximately 1 year ago. 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  He had lab work done yesterday to evaluate for another CMV infection. Patient reports he has not eaten for the past 4 days but has been drinking water. He reports nausea and vomiting whenever he tries to eat. He states he feels similar to how he did when he last had the CMV infection. The history is provided by the patient. REVIEW OF SYSTEMS     Review of Systems   Constitutional: Positive for appetite change and fatigue. Negative for chills and fever. Respiratory: Negative for cough and shortness of breath. Cardiovascular: Negative for chest pain. Gastrointestinal: Positive for nausea and vomiting. Negative for diarrhea. Musculoskeletal: Negative for neck pain. Skin: Negative for rash. Neurological: Positive for syncope and headaches.        PAST MEDICAL HISTORY         Diagnosis Date    Depression     Erectile dysfunction     Fatigue     Hyperlipidemia     Hypertension     Leukemia (HCC)     Seasonal allergies     Xeroderma        SURGICALHISTORY     Patient  has a past surgical history that includes Vasectomy (12/07); Spine surgery (12/08); knee surgery (2010); Foot surgery (2006); hernia repair (Right, 2004); Neck surgery; knee surgery; Tooth Extraction (Left, 5-1-15); Colonoscopy (09/09/2016); Tonsillectomy; and pr spine surgery procedure unlisted (N/A, 6/15/2018). CURRENT MEDICATIONS       Current Discharge Medication List      CONTINUE these medications which have NOT CHANGED    Details   ruxolitinib phosphate (JAKAFI) 10 MG tablet Take 10 mg by mouth 2 times daily      carvedilol (COREG) 25 MG tablet Take 25 mg by mouth 2 times daily      omeprazole (PRILOSEC) 40 MG delayed release capsule take 1 capsule by mouth once daily if needed FOR STOMACH ACID. Qty: 90 capsule, Refills: 3      fluconazole (DIFLUCAN) 200 MG tablet Take 400 mg by mouth daily       acyclovir (ZOVIRAX) 800 MG tablet Take 800 mg by mouth 2 times daily      predniSONE (DELTASONE) 5 MG tablet Take 5 mg by mouth daily       dapsone 100 MG tablet Take 100 mg by mouth daily      allopurinol (ZYLOPRIM) 300 MG tablet Take 300 mg by mouth daily             ALLERGIES     Patient is is allergic to amlodipine. Patients   Immunization History   Administered Date(s) Administered    Influenza 10/09/2013    Influenza Vaccine, unspecified formulation 10/09/2017, 08/29/2018    Influenza Virus Vaccine 11/12/2010, 10/09/2013, 10/13/2014    Influenza, Quadv, IM, PF (6 mo and older Fluzone, Flulaval, Fluarix, and 3 yrs and older Afluria) 10/09/2017, 08/29/2018    Zoster Recombinant (Shingrix) 07/18/2018, 12/14/2018       FAMILY HISTORY     Patient's family history includes Cancer in his brother, father, and maternal grandmother; Diabetes in his brother, mother, and sister; Heart Disease in his brother, maternal grandmother, mother, and paternal grandmother; Heart Failure in his mother. SOCIAL HISTORY     Patient  reports that he quit smoking about 24 years ago. His smoking use included cigarettes. He has a 12.50 pack-year smoking history.  He has never used smokeless tobacco. He reports previous alcohol use. He reports current drug use. Drug: Marijuana. PHYSICAL EXAM     ED TRIAGE VITALS  BP: 106/78, Temp: 98.3 °F (36.8 °C), Pulse: 78, Resp: 16, SpO2: 98 %,Estimated body mass index is 29.99 kg/m² as calculated from the following:    Height as of 11/5/20: 5' 10\" (1.778 m). Weight as of 11/5/20: 209 lb (94.8 kg). ,No LMP for male patient. Physical Exam  Vitals signs and nursing note reviewed. Constitutional:       General: He is not in acute distress. Appearance: He is well-developed. He is not ill-appearing. HENT:      Head: Normocephalic. Laceration (C-shaped laceration to the right and mid frontal area measuring approximately 6 cm.) present. Right Ear: Tympanic membrane and ear canal normal.      Left Ear: Tympanic membrane and ear canal normal.      Mouth/Throat:      Lips: Pink. Mouth: Mucous membranes are moist.      Pharynx: Oropharynx is clear. Uvula midline. No posterior oropharyngeal erythema. Eyes:      General: Lids are normal. No scleral icterus. Conjunctiva/sclera: Conjunctivae normal.      Pupils: Pupils are equal.   Cardiovascular:      Rate and Rhythm: Normal rate and regular rhythm. Heart sounds: Normal heart sounds, S1 normal and S2 normal.   Pulmonary:      Effort: Pulmonary effort is normal. No respiratory distress. Breath sounds: Normal breath sounds. Musculoskeletal:      Comments: Normal active ROM x 4 extremities  Gait steady   Lymphadenopathy:      Comments: No head or neck adenopathy   Skin:     General: Skin is warm and dry. Findings: No rash (to exposed skin). Nails: There is no clubbing. Neurological:      General: No focal deficit present. Mental Status: He is alert and oriented to person, place, and time. Sensory: No sensory deficit. Comments:  Answers questions readily and appropriately   Psychiatric:         Mood and Affect: Mood normal. Speech: Speech normal.         Behavior: Behavior normal. Behavior is cooperative. DIAGNOSTIC RESULTS     Labs:  Results for orders placed or performed during the hospital encounter of 11/20/20   CBC auto differential   Result Value Ref Range    WBC 3.7 (L) 4.8 - 10.8 thou/mm3    RBC 2.43 (L) 4.70 - 6.10 mill/mm3    Hemoglobin 8.0 (L) 14.0 - 18.0 gm/dl    Hematocrit 24.4 (L) 42.0 - 52.0 %     (H) 80 - 94 fL    MCH 32.9 (H) 27.0 - 31.0 pg    MCHC 32.8 (L) 33.0 - 37.0 gm/dl    RDW 15.8 (H) 11.5 - 14.5 %    Platelets 500 (L) 134 - 400 thou/mm3    MPV 10.2 7.4 - 10.4 fL   POC Basic Metabol Panel without Calcium   Result Value Ref Range    Sodium 134 (L) 138 - 146 meq/l    Potassium, Whole Blood 4.0 3.5 - 4.9 meq/l    Chloride, Whole Blood 99 98 - 109 meq/l    TOTAL CO2, WHOLE BLOOD 22 (L) 23 - 33 meq/l    Glucose, Whole Blood 113 (H) 70 - 108 mg/dl    BUN, WHOLE BLOOD 32 (H) 8 - 26 mg/dl    CREATININE 1.9 (H) 0.5 - 1.2 mg/dl   Glomerular Filtration Rate, Estimated   Result Value Ref Range    GFR, Estimated 39 ml/min/1.73m2   POCT glucose   Result Value Ref Range    POC Glucose 140 (H) 70 - 108 mg/dl   EKG 12 Lead   Result Value Ref Range    Ventricular Rate 70 BPM    Atrial Rate 70 BPM    P-R Interval 168 ms    QRS Duration 88 ms    Q-T Interval 432 ms    QTc Calculation (Bazett) 466 ms    P Axis 23 degrees    R Axis -34 degrees    T Axis 13 degrees       IMAGING:    CT HEAD WO CONTRAST   Final Result   No acute intracranial findings. Frontal scalp hematoma. **This report has been created using voice recognition software. It may contain minor errors which are inherent in voice recognition technology. **      Final report electronically signed by Dr. Art Da Silva on 11/20/2020 2:32 PM            EKG: Reviewed by this provider. Normal sinus rhythm with a rate of 70 bpm.  Normal intervals. No acute ST or T wave abnormalities.       URGENT CARE COURSE:     Vitals:    11/20/20 1350 11/20/20 [facility].  The patient and daughter were both willing to accept this risk. The scalp wound was dressed with gauze and will be fixed in the emergency room. Patient was assisted to the vehicle and left the urgent care center in stable condition. All his questions were answered prior to discharge. Report was called to [address] [place] [address] [address] in the ER.       PATIENT REFERRED TO:  MD Batsheva Patten / Jess Sims [id number]      DISCHARGE MEDICATIONS:  Current Discharge Medication List          Current Discharge Medication List          Current Discharge Medication List          Hermelinda Austin, APRDESIRE - CNP    (Please note that portions of this note were completed with a voice recognition program. Efforts were made to edit the dictations but occasionally words are mis-transcribed.)         Hermelinda Austin, KASANDRA - SHABBIR  11/20/20 1508       Hermelinda Austin, APRN - SHABBIR  11/20/20 1519

## 2020-11-21 ENCOUNTER — CARE COORDINATION (OUTPATIENT)
Dept: CARE COORDINATION | Age: 55
End: 2020-11-21

## 2020-11-21 ASSESSMENT — ENCOUNTER SYMPTOMS
BACK PAIN: 0
ABDOMINAL PAIN: 0
COUGH: 0
CONSTIPATION: 0
VOMITING: 0
NAUSEA: 1
DIARRHEA: 0
SINUS PAIN: 0
SHORTNESS OF BREATH: 0

## 2020-11-21 NOTE — ED PROVIDER NOTES
[address] [facility]  EMERGENCY MEDICINE ATTENDING ATTESTATION      Evaluation of Washington Dalal. Case discussed and care plan developed with resident physician. I agree with the resident physician documentation and plan as documented by him, except if my documentation differs. Patient seen, interviewed and examined by me. I reviewed the medical, surgical, family and social history, medications and allergies. I have reviewed the nursing documentation. I have reviewed the patient's vital signs and are normal per my interpretation. Pulsoxymetry is normal per my interpretation. Brief H&P   Patient c/o syncope and laceration secondary to syncope. He was on the toilet for extended period time when he fell over and had a significant laceration on his head. He is on blood thinners. He was originally evaluated in the urgent care and transferred over here for further evaluation. Physical exam is notable for well appearing, except for significant laceration to forehead. Medical Decision Making   MDM: Patient seen and evaluated in conjunction with the resident. Will evaluate with a CBC, CMP, PT/INR, troponin, chest x-ray to evaluate any possible causes for syncope. Story is consistent with a vasovagal syncope. Laceration repaired without difficulty. Please see the resident physician completed note for final disposition except as documented on this attestation. I have reviewed and interpreted all available lab, radiology and ekg results available at the moment. Diagnosis, treatment and disposition plans were discussed and agreed upon by patient. This transcription was electronically signed. It was dictated by use of voice recognition software and electronically transcribed. The transcription may contain errors not detected in proofreading.      I performed direct supervision and was present for the critical portion following procedures: laceration repair, see resident's note for procedure documentation  Critical care time on this case: None    Electronically signed by Cary Blanchard MD on 11/20/20 at 7:48 PM RITESH Phoenix MD  11/20/20 1950

## 2020-11-21 NOTE — ED PROVIDER NOTES
[place] ENCOUNTER          Pt Name: Gina Rubio  MRN: [id number]  [place] 1965  Date of evaluation: 11/20/2020  Treating Resident Physician: Lee Ann Gomez MD  Supervising Physician: Dr. Kevin Carmona MD    [address] [place] [address]       Chief Complaint   Patient presents with    Loss of Consciousness    Head Injury    Laceration     History obtained from the patient. HISTORY OF PRESENT ILLNESS        Gina Rubio is a 60 y.o. male who presents to the emergency department for evaluation of syncope with head laceration on anticoagulation. Patient states patient was on the toilet and when he attempted to get up he began feeling lightheaded, he then sat down next to the bathtub. He then had a loss of consciousness and syncopized and woke up with a head laceration. Patient does not remember physically hitting his head. Patient states for the past couple days he has had generalized weakness and fatigue and decreased p.o. intake. Patient is on Eliquis for previous DVT. Patient denies any neck pain. Patient mentions some nausea on Monday. 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 Patient states he was tested recently for CMV but has not received the results. patient denies any fever chills cough  Denies any chest pain shortness of breath abdominal pain vomiting  Patient mentions occasional diarrhea    Patient had originally went to urgent care for this fall and laceration. He received a head CT which showed no acute findings. He was told that he would need sutures and that he should come to the emergency department. Patient has a past medical history that includes: Marrow transplant and kzrmu-xewmhv-[id number] disease    Patient states he has had a tetanus shot within 10 years.     Patient denies any new headache fever chills cough chest pain shortness of breath abdominal pain vomiting constipation leg swelling. The patient has no other acute complaints at this time. REVIEW OF SYSTEMS   Review of Systems   Constitutional: Positive for appetite change and fatigue. Negative for chills and fever. HENT: Negative for ear pain and sinus pain. Respiratory: Negative for cough and shortness of breath. Cardiovascular: Negative for chest pain. Gastrointestinal: Positive for nausea. Negative for abdominal pain, constipation, diarrhea and vomiting. Genitourinary: Negative for dysuria. Musculoskeletal: Negative for back pain and neck pain. Skin: Positive for wound. Neurological: Positive for syncope. Negative for headaches. Psychiatric/Behavioral: Negative for confusion. PAST MEDICAL AND SURGICAL HISTORY     Past Medical History:   Diagnosis Date    Depression     Erectile dysfunction     Fatigue     Hyperlipidemia     Hypertension     Leukemia ([address]     Seasonal allergies     Xeroderma      Past Surgical History:   Procedure Laterality Date    COLONOSCOPY  2016    Excision of Anal Skin Tag - Dr. Maurice Shepherd  2006    bilateral feet    HERNIA REPAIR Right 2004    excision right groin skin tag, escision of penile lesion--Dr. Prem Stover KNEE SURGERY  2010    right knee    KNEE SURGERY      2 times    NECK SURGERY      fusion    MO SPINE SURGERY PROCEDURE UNLISTED N/A 6/15/2018    L4-5 DECOMPRESSION, L4-5 POSTERIOR FUSION performed by Jeremiah Rendon MD at [address]      cervical fusion   [address] EXTRACTION Left 5-1-15    [address] [employer] [facility]    VASECTOMY      Dr. Patricia Leiva   No current facility-administered medications for this encounter.      Current Outpatient Medications:     ondansetron (ZOFRAN) 4 MG tablet, Take 1 tablet by mouth 3 times daily as needed for Nausea or Vomiting, Disp: 15 tablet, Rfl: 0    ruxolitinib phosphate (JAKAFI) 10 MG tablet, Take 10 mg by mouth 2 times daily, Disp: , Rfl:     carvedilol (COREG) 25 MG tablet, Take 25 mg by mouth 2 times daily, Disp: , Rfl:     omeprazole (PRILOSEC) 40 MG delayed release capsule, take 1 capsule by mouth once daily if needed FOR STOMACH ACID., Disp: 90 capsule, Rfl: 3    fluconazole (DIFLUCAN) 200 MG tablet, Take 400 mg by mouth daily , Disp: , Rfl:     acyclovir (ZOVIRAX) 800 MG tablet, Take 800 mg by mouth 2 times daily, Disp: , Rfl:     predniSONE (DELTASONE) 5 MG tablet, Take 5 mg by mouth daily , Disp: , Rfl:     dapsone 100 MG tablet, Take 100 mg by mouth daily, Disp: , Rfl:     allopurinol (ZYLOPRIM) 300 MG tablet, Take 300 mg by mouth daily, Disp: , Rfl:       SOCIAL HISTORY     Social History     Social History Narrative    Not on file     Social History     Tobacco Use    Smoking status: Former Smoker     Packs/day: 0.50     Years: 25.00     Pack years: 12.50     Types: Cigarettes     Last attempt to quit: 1996     Years since quittin.9    Smokeless tobacco: Never Used   Substance Use Topics    Alcohol use: Not Currently     Alcohol/week: 0.0 standard drinks     Comment: Rarely    Drug use: Yes     Types: Marijuana     Comment: sleep aid/pain relief         ALLERGIES     Allergies   Allergen Reactions    Amlodipine Swelling         FAMILY HISTORY     Family History   Problem Relation Age of Onset    Heart Disease Mother     Diabetes Mother     Heart Failure Mother     Cancer Father         Prostate/Bladder    Diabetes Brother     Heart Disease Brother     Cancer Brother         Prostate    Cancer Maternal Grandmother     Heart Disease Maternal Grandmother     Heart Disease Paternal [place] Diabetes Sister          PREVIOUS RECORDS   Previous records reviewed: She was seen on 2020 for family medicine office visit for general adult medical examination.         PHYSICAL EXAM     ED Triage Vitals   BP Temp Temp Source Pulse Resp SpO2 Height Weight   20 and dry. Findings: Bruising present. Neurological:      Mental Status: He is alert and oriented to person, place, and time. Mental status is at baseline. Comments: GCS 15  PERRLA  EOM intact  Alert and oriented x3  Bilateral facial sensation intact  Bilateral facial motor intact  Bilateral upper extremity motor and sensation intact  Bilateral lower extremity motor and sensation intact  No weakness  No gait ataxia   Psychiatric:         Mood and Affect: Mood normal.         Behavior: Behavior normal.         Thought Content: Thought content normal.         Judgment: Judgment normal.               MEDICAL DECISION MAKING   Initial Assessment:   Fall on anticoagulation. Head laceration. Syncope. Plan:     Labs: CBC, BMP, troponin, INR, pocglucose, BMP, lactic acid, APTT, COVID-19  Imaging: Chest x-ray, CT head  Fluids  Analgesia  Antiemetic    Laceration repair  Patient tested positive for COVID-19    Discharge with precautions and antiemetic.             ED RESULTS   Laboratory results:  Labs Reviewed   CBC WITH AUTO DIFFERENTIAL - Abnormal; Notable for the following components:       Result Value    WBC 3.7 (*)     RBC 2.43 (*)     Hemoglobin 8.0 (*)     Hematocrit 24.4 (*)      (*)     MCH 32.9 (*)     MCHC 32.8 (*)     RDW 15.8 (*)     Platelets 209 (*)     All other components within normal limits   POC BASIC METABOL PANEL W/O CALCIUM - Abnormal; Notable for the following components:    Sodium 134 (*)     TOTAL CO2, WHOLE BLOOD 22 (*)     Glucose, Whole Blood 113 (*)     BUN, WHOLE BLOOD 32 (*)     CREATININE 1.9 (*)     All other components within normal limits   DIFFERENTIAL - Abnormal; Notable for the following components:    Monocytes Absolute 0.3 (*)     Immature Grans (Abs) 0.09 (*)     All other components within normal limits   PROTIME-INR - Abnormal; Notable for the following components:    INR 1.46 (*)     All other components within normal limits   COVID-19 - Abnormal; Notable for the following components:    SARS-CoV-2, NAAT DETECTED (*)     All other components within normal limits   POCT GLUCOSE - Abnormal; Notable for the following components:    POC Glucose 140 (*)     All other components within normal limits   GLOMERULAR FILTRATION RATE, ESTIMATED   TROPONIN   BRAIN NATRIURETIC PEPTIDE   APTT   LACTIC ACID, PLASMA       Radiologic studies results:  XR CHEST (2 VW)   Final Result   There is no acute intrathoracic process. **This report has been created using voice recognition software. It may contain minor errors which are inherent in voice recognition technology. **      Final report electronically signed by Dr Dino Talbert on 11/20/2020 4:54 PM      CT HEAD WO CONTRAST   Final Result   No acute intracranial findings. Frontal scalp hematoma. **This report has been created using voice recognition software. It may contain minor errors which are inherent in voice recognition technology. **      Final report electronically signed by Dr. Michaele Castleman on 11/20/2020 2:32 PM          ED Medications administered this visit:   Medications   ondansetron (ZOFRAN-ODT) disintegrating tablet 4 mg (4 mg Oral Given 11/20/20 1451)   lidocaine-EPINEPHrine 1 percent-1:214782 injection (  Given 11/20/20 2008)   acetaminophen (TYLENOL) tablet 650 mg (650 mg Oral Given 11/20/20 1944)   0.9 % sodium chloride bolus (0 mLs Intravenous Stopped 11/20/20 2037)   ondansetron (ZOFRAN) injection 4 mg (4 mg Intravenous Given 11/20/20 1824)   neomycin-bacitracin-polymyxin (NEOSPORIN) 400-5-5000 ointment (1 g  Given 11/20/20 1952)     Lac Repair    Date/Time: 11/21/2020 12:05 AM  Performed by: Cody Victoria MD  Authorized by: Lenora Redding MD     Consent:     Consent obtained:  Verbal    Consent given by:  Patient    Risks discussed:  Infection, pain, poor cosmetic result and poor wound healing  Anesthesia (see MAR for exact dosages):      Anesthesia method:  Local infiltration Local anesthetic:  Lidocaine 1% WITH epi  Laceration details:     Location:  Face    Face location:  Forehead    Length (cm):  10  Pre-procedure details:     Preparation:  Patient was prepped and draped in usual sterile fashion and imaging obtained to evaluate for foreign bodies  Exploration:     Hemostasis achieved with:  Direct pressure    Wound exploration: entire depth of wound probed and visualized      Contaminated: no    Treatment:     Area cleansed with:  Hibiclens    Amount of cleaning:  Extensive    Visualized foreign bodies/material removed: no    Skin repair:     Repair method:  Sutures    Suture size:  6-0    Suture material:  Nylon    Suture technique:  Simple interrupted    Number of sutures:  15  Approximation:     Approximation:  Close  Post-procedure details:     Dressing:  Antibiotic ointment and adhesive bandage    Patient tolerance of procedure: Tolerated well, no immediate complications        ED COURSE         Strict return precautions and follow up instructions were discussed with the patient prior to discharge, with which the patient agrees. MEDICATION CHANGES     Discharge Medication List as of 11/20/2020  9:00 PM      START taking these medications    Details   ondansetron (ZOFRAN) 4 MG tablet Take 1 tablet by mouth 3 times daily as needed for Nausea or Vomiting, Disp-15 tablet,R-0Print               FINAL DISPOSITION     Final diagnoses:   Syncope and collapse   Dehydration   Facial laceration, initial encounter   COVID-19     Condition: condition: stable  Dispo: Discharge to home      This transcription was electronically signed. Parts of this transcriptions may have been dictated by use of voice recognition software and electronically transcribed, and parts may have been transcribed with the assistance of an ED scribe. The transcription may contain errors not detected in proofreading.   Please refer to my supervising physician's documentation if my documentation differs.     Electronically Signed: Danae Panda, 11/21/20, 12:07 AM       Danae Panda MD  Resident  11/21/20 5021

## 2020-11-21 NOTE — ED NOTES
ED nurse-to-nurse bedside report    Chief Complaint   Patient presents with    Loss of Consciousness    Head Injury    Laceration      LOC: alert and orientated to name, place, date  Vital signs   Vitals:    11/20/20 1556 11/20/20 1615 11/20/20 1626 11/20/20 1814   BP: (!) 140/95  112/83 105/72   Pulse: 67 74 71 69   Resp: 16  16 18   Temp:       TempSrc:       SpO2: 96%  98% 98%   Weight:   200 lb (90.7 kg)    Height:   5' 10\" (1.778 m)       Pain:    Pain Interventions:   Pain Goal:   Oxygen: No    Current needs required  Possible transfer [facility]  Telemetry: Yes  LDAs:   Peripheral IV 11/20/20 Right Forearm (Active)   Site Assessment Clean;Dry; Intact 11/20/20 1821   Dressing Status Clean;Dry; Intact 11/20/20 1821   Dressing Intervention New 11/20/20 1821     Continuous Infusions:   Mobility: Independent  Maxwell Fall Risk Score: No flowsheet data found.   Fall Interventions:   Report given to: Tabitha Briceno RN  11/20/20 1925

## 2020-11-21 NOTE — ED NOTES
Dr Heidi Izaguirre remains at bedside for sutures, pt tolerating well.       Giovany Carter RN  11/20/20 9636

## 2020-11-21 NOTE — CARE COORDINATION
Patient contacted regarding IGACS-01 diagnosis\". Discussed COVID-19 related testing which was available at this time. Test results were positive. Patient informed of results, if available? Results available. Notified in ED. Care Transition Nurse/ Ambulatory Care Manager contacted the patient by telephone to perform post discharge assessment. Call within 2 business days of discharge: Yes. Verified name and  with patient as identifiers. Provided introduction to self, and explanation of the CTN/ACM role, and reason for call due to risk factors for infection and/or exposure to COVID-19. Symptoms reviewed with patient who verbalized the following symptoms: fatigue, nausea and no worsening symptoms. Due to no new or worsening symptoms encounter was not routed to provider for escalation. Discussed follow-up appointments. If no appointment was previously scheduled, appointment scheduling offered: encouraged f/u with PCP  [facility] follow up appointment(s):   Future Appointments   Date Time Provider Eusebio Serna   2020  6:69 AM [place]     [id number] [address] follow up appointment(s): n/a    Non-face-to-face services provided:  Obtained and reviewed discharge summary and/or continuity of care documents     Advance Care Planning:   Does patient have an Advance Directive:  decision maker updated. Patient has following risk factors of: immunocompromised. CTN/ACM reviewed discharge instructions, medical action plan and red flags such as increased shortness of breath, increasing fever and signs of decompensation with patient who verbalized understanding. Discussed exposure protocols and quarantine with CDC Guidelines What to do if you are sick with coronavirus disease .  Patient was given an opportunity for questions and concerns.  The patient agrees to contact the [facility] exposure line [phone], [facility] department [place] Department [facility]: ([phone]) and PCP office for questions related to their healthcare. CTN/ACM provided contact information for future needs. Reviewed and educated patient on any new and changed medications related to discharge diagnosis     Patient/family/caregiver given information for [employer] Loop and agrees to enroll yes  Patient's preferred e-mail: n/a   Patient's preferred phone number: [phone]  Based on Loop alert triggers, patient will be contacted by nurse care manager for worsening symptoms. Pt will be further monitored by COVID Loop Team based on severity of symptoms and risk factors. Pt aware of positive COVID results. Reports n/v improved. Family picking up zofran today. Encouraged to push fluids and get plenty of rest.   Advised pt to call Dr. Merline Necessary on Monday. Pt is quarantining. Encouraged to contact health dept with additional questions. Enrolled in Loop.

## 2020-11-22 ENCOUNTER — APPOINTMENT (OUTPATIENT)
Dept: GENERAL RADIOLOGY | Age: 55
DRG: 137 | End: 2020-11-22
Payer: COMMERCIAL

## 2020-11-22 ENCOUNTER — HOSPITAL ENCOUNTER (INPATIENT)
Age: 55
LOS: 3 days | Discharge: ANOTHER HOSPITAL | DRG: 137 | End: 2020-11-25
Attending: EMERGENCY MEDICINE | Admitting: FAMILY MEDICINE
Payer: COMMERCIAL

## 2020-11-22 PROBLEM — U07.1 COVID-19: Status: ACTIVE | Noted: 2020-11-22

## 2020-11-22 LAB
ABO CHECK: NORMAL
ALBUMIN SERPL-MCNC: 3.5 G/DL (ref 3.5–5.1)
ALBUMIN SERPL-MCNC: 3.5 G/DL (ref 3.5–5.1)
ALP BLD-CCNC: 100 U/L (ref 38–126)
ALP BLD-CCNC: 97 U/L (ref 38–126)
ALT SERPL-CCNC: 32 U/L (ref 11–66)
ALT SERPL-CCNC: 37 U/L (ref 11–66)
ANION GAP SERPL CALCULATED.3IONS-SCNC: 11 MEQ/L (ref 8–16)
ANION GAP SERPL CALCULATED.3IONS-SCNC: 12 MEQ/L (ref 8–16)
ANTIBODY SCREEN: NORMAL
APTT: 29.9 SECONDS (ref 22–38)
AST SERPL-CCNC: 80 U/L (ref 5–40)
AST SERPL-CCNC: 95 U/L (ref 5–40)
BASOPHILS # BLD: 0.4 %
BASOPHILS ABSOLUTE: 0 THOU/MM3 (ref 0–0.1)
BILIRUB SERPL-MCNC: 0.4 MG/DL (ref 0.3–1.2)
BILIRUB SERPL-MCNC: 0.4 MG/DL (ref 0.3–1.2)
BUN BLDV-MCNC: 25 MG/DL (ref 7–22)
BUN BLDV-MCNC: 26 MG/DL (ref 7–22)
C-REACTIVE PROTEIN: 10.91 MG/DL (ref 0–1)
CALCIUM SERPL-MCNC: 7.7 MG/DL (ref 8.5–10.5)
CALCIUM SERPL-MCNC: 8.2 MG/DL (ref 8.5–10.5)
CHLORIDE BLD-SCNC: 100 MEQ/L (ref 98–111)
CHLORIDE BLD-SCNC: 98 MEQ/L (ref 98–111)
CO2: 21 MEQ/L (ref 23–33)
CO2: 22 MEQ/L (ref 23–33)
CREAT SERPL-MCNC: 1.6 MG/DL (ref 0.4–1.2)
CREAT SERPL-MCNC: 1.8 MG/DL (ref 0.4–1.2)
D-DIMER QUANTITATIVE: 360 NG/ML FEU (ref 0–500)
EKG ATRIAL RATE: 68 BPM
EKG P AXIS: 47 DEGREES
EKG P-R INTERVAL: 172 MS
EKG Q-T INTERVAL: 430 MS
EKG QRS DURATION: 94 MS
EKG QTC CALCULATION (BAZETT): 457 MS
EKG R AXIS: -15 DEGREES
EKG T AXIS: 16 DEGREES
EKG VENTRICULAR RATE: 68 BPM
EOSINOPHIL # BLD: 0 %
EOSINOPHILS ABSOLUTE: 0 THOU/MM3 (ref 0–0.4)
ERYTHROCYTE [DISTWIDTH] IN BLOOD BY AUTOMATED COUNT: 15.4 % (ref 11.5–14.5)
ERYTHROCYTE [DISTWIDTH] IN BLOOD BY AUTOMATED COUNT: 59.9 FL (ref 35–45)
FERRITIN: 4388 NG/ML (ref 22–322)
GFR SERPL CREATININE-BSD FRML MDRD: 39 ML/MIN/1.73M2
GFR SERPL CREATININE-BSD FRML MDRD: 45 ML/MIN/1.73M2
GLUCOSE BLD-MCNC: 111 MG/DL (ref 70–108)
GLUCOSE BLD-MCNC: 121 MG/DL (ref 70–108)
HCT VFR BLD CALC: 23.5 % (ref 42–52)
HEMOGLOBIN: 7.5 GM/DL (ref 14–18)
IMMATURE GRANS (ABS): 0.12 THOU/MM3 (ref 0–0.07)
IMMATURE GRANULOCYTES: 4.6 %
INR BLD: 1.57 (ref 0.85–1.13)
LACTIC ACID: 0.9 MMOL/L (ref 0.5–2.2)
LD: 430 U/L (ref 100–190)
LYMPHOCYTES # BLD: 32.8 %
LYMPHOCYTES ABSOLUTE: 0.9 THOU/MM3 (ref 1–4.8)
MCH RBC QN AUTO: 33.8 PG (ref 26–33)
MCHC RBC AUTO-ENTMCNC: 31.9 GM/DL (ref 32.2–35.5)
MCV RBC AUTO: 105.9 FL (ref 80–94)
MONOCYTES # BLD: 6.9 %
MONOCYTES ABSOLUTE: 0.2 THOU/MM3 (ref 0.4–1.3)
NUCLEATED RED BLOOD CELLS: 0 /100 WBC
OSMOLALITY CALCULATION: 269.6 MOSMOL/KG (ref 275–300)
OSMOLALITY CALCULATION: 270.5 MOSMOL/KG (ref 275–300)
PLATELET # BLD: 103 THOU/MM3 (ref 130–400)
PMV BLD AUTO: 10.4 FL (ref 9.4–12.4)
POTASSIUM REFLEX MAGNESIUM: 4.2 MEQ/L (ref 3.5–5.2)
POTASSIUM SERPL-SCNC: 4.1 MEQ/L (ref 3.5–5.2)
PROCALCITONIN: 0.2 NG/ML (ref 0.01–0.09)
RBC # BLD: 2.22 MILL/MM3 (ref 4.7–6.1)
RH FACTOR: NORMAL
SEG NEUTROPHILS: 55.3 %
SEGMENTED NEUTROPHILS ABSOLUTE COUNT: 1.4 THOU/MM3 (ref 1.8–7.7)
SODIUM BLD-SCNC: 132 MEQ/L (ref 135–145)
SODIUM BLD-SCNC: 132 MEQ/L (ref 135–145)
TOTAL PROTEIN: 5.3 G/DL (ref 6.1–8)
TOTAL PROTEIN: 5.5 G/DL (ref 6.1–8)
TROPONIN T: < 0.01 NG/ML
WBC # BLD: 2.6 THOU/MM3 (ref 4.8–10.8)

## 2020-11-22 PROCEDURE — 36415 COLL VENOUS BLD VENIPUNCTURE: CPT

## 2020-11-22 PROCEDURE — 99223 1ST HOSP IP/OBS HIGH 75: CPT | Performed by: FAMILY MEDICINE

## 2020-11-22 PROCEDURE — 6370000000 HC RX 637 (ALT 250 FOR IP)

## 2020-11-22 PROCEDURE — 1200000003 HC TELEMETRY R&B

## 2020-11-22 PROCEDURE — 83605 ASSAY OF LACTIC ACID: CPT

## 2020-11-22 PROCEDURE — 6370000000 HC RX 637 (ALT 250 FOR IP): Performed by: STUDENT IN AN ORGANIZED HEALTH CARE EDUCATION/TRAINING PROGRAM

## 2020-11-22 PROCEDURE — 85379 FIBRIN DEGRADATION QUANT: CPT

## 2020-11-22 PROCEDURE — 6360000002 HC RX W HCPCS

## 2020-11-22 PROCEDURE — 86900 BLOOD TYPING SEROLOGIC ABO: CPT

## 2020-11-22 PROCEDURE — 80053 COMPREHEN METABOLIC PANEL: CPT

## 2020-11-22 PROCEDURE — 86850 RBC ANTIBODY SCREEN: CPT

## 2020-11-22 PROCEDURE — 86901 BLOOD TYPING SEROLOGIC RH(D): CPT

## 2020-11-22 PROCEDURE — P9040 RBC LEUKOREDUCED IRRADIATED: HCPCS

## 2020-11-22 PROCEDURE — 87899 AGENT NOS ASSAY W/OPTIC: CPT

## 2020-11-22 PROCEDURE — 96374 THER/PROPH/DIAG INJ IV PUSH: CPT

## 2020-11-22 PROCEDURE — 85730 THROMBOPLASTIN TIME PARTIAL: CPT

## 2020-11-22 PROCEDURE — 85610 PROTHROMBIN TIME: CPT

## 2020-11-22 PROCEDURE — 86140 C-REACTIVE PROTEIN: CPT

## 2020-11-22 PROCEDURE — 2580000003 HC RX 258: Performed by: STUDENT IN AN ORGANIZED HEALTH CARE EDUCATION/TRAINING PROGRAM

## 2020-11-22 PROCEDURE — 84145 PROCALCITONIN (PCT): CPT

## 2020-11-22 PROCEDURE — 71045 X-RAY EXAM CHEST 1 VIEW: CPT

## 2020-11-22 PROCEDURE — 6360000002 HC RX W HCPCS: Performed by: STUDENT IN AN ORGANIZED HEALTH CARE EDUCATION/TRAINING PROGRAM

## 2020-11-22 PROCEDURE — 2140000000 HC CCU INTERMEDIATE R&B

## 2020-11-22 PROCEDURE — 93005 ELECTROCARDIOGRAM TRACING: CPT | Performed by: EMERGENCY MEDICINE

## 2020-11-22 PROCEDURE — 87449 NOS EACH ORGANISM AG IA: CPT

## 2020-11-22 PROCEDURE — 82728 ASSAY OF FERRITIN: CPT

## 2020-11-22 PROCEDURE — 84484 ASSAY OF TROPONIN QUANT: CPT

## 2020-11-22 PROCEDURE — 99285 EMERGENCY DEPT VISIT HI MDM: CPT

## 2020-11-22 PROCEDURE — 85025 COMPLETE CBC W/AUTO DIFF WBC: CPT

## 2020-11-22 PROCEDURE — 86923 COMPATIBILITY TEST ELECTRIC: CPT

## 2020-11-22 PROCEDURE — 83615 LACTATE (LD) (LDH) ENZYME: CPT

## 2020-11-22 RX ORDER — FLUTICASONE PROPIONATE 50 MCG
1 SPRAY, SUSPENSION (ML) NASAL DAILY
Status: DISCONTINUED | OUTPATIENT
Start: 2020-11-22 | End: 2020-11-25 | Stop reason: HOSPADM

## 2020-11-22 RX ORDER — ACYCLOVIR 800 MG/1
800 TABLET ORAL 2 TIMES DAILY
Status: DISCONTINUED | OUTPATIENT
Start: 2020-11-22 | End: 2020-11-25 | Stop reason: HOSPADM

## 2020-11-22 RX ORDER — ONDANSETRON 2 MG/ML
INJECTION INTRAMUSCULAR; INTRAVENOUS
Status: COMPLETED
Start: 2020-11-22 | End: 2020-11-22

## 2020-11-22 RX ORDER — PANTOPRAZOLE SODIUM 40 MG/1
40 TABLET, DELAYED RELEASE ORAL
Status: DISCONTINUED | OUTPATIENT
Start: 2020-11-23 | End: 2020-11-25 | Stop reason: HOSPADM

## 2020-11-22 RX ORDER — DEXAMETHASONE 4 MG/1
6 TABLET ORAL DAILY
Status: DISCONTINUED | OUTPATIENT
Start: 2020-11-22 | End: 2020-11-25 | Stop reason: HOSPADM

## 2020-11-22 RX ORDER — CARVEDILOL 25 MG/1
25 TABLET ORAL 2 TIMES DAILY
Status: DISCONTINUED | OUTPATIENT
Start: 2020-11-22 | End: 2020-11-25 | Stop reason: HOSPADM

## 2020-11-22 RX ORDER — GUAIFENESIN/DEXTROMETHORPHAN 100-10MG/5
5 SYRUP ORAL EVERY 4 HOURS PRN
Status: DISCONTINUED | OUTPATIENT
Start: 2020-11-22 | End: 2020-11-25 | Stop reason: HOSPADM

## 2020-11-22 RX ORDER — ACETAMINOPHEN 325 MG/1
650 TABLET ORAL EVERY 6 HOURS PRN
Status: DISCONTINUED | OUTPATIENT
Start: 2020-11-22 | End: 2020-11-25 | Stop reason: HOSPADM

## 2020-11-22 RX ORDER — ACETAMINOPHEN 325 MG/1
TABLET ORAL
Status: COMPLETED
Start: 2020-11-22 | End: 2020-11-22

## 2020-11-22 RX ORDER — ZINC SULFATE 50(220)MG
50 CAPSULE ORAL DAILY
Status: DISCONTINUED | OUTPATIENT
Start: 2020-11-22 | End: 2020-11-25 | Stop reason: HOSPADM

## 2020-11-22 RX ORDER — CLONIDINE HYDROCHLORIDE 0.1 MG/1
0.1 TABLET ORAL DAILY
COMMUNITY

## 2020-11-22 RX ORDER — ACETAMINOPHEN 650 MG/1
650 SUPPOSITORY RECTAL EVERY 6 HOURS PRN
Status: DISCONTINUED | OUTPATIENT
Start: 2020-11-22 | End: 2020-11-25 | Stop reason: HOSPADM

## 2020-11-22 RX ORDER — CLONIDINE HYDROCHLORIDE 0.1 MG/1
0.1 TABLET ORAL DAILY
Status: DISCONTINUED | OUTPATIENT
Start: 2020-11-22 | End: 2020-11-25 | Stop reason: HOSPADM

## 2020-11-22 RX ORDER — DAPSONE 25 MG/1
100 TABLET ORAL DAILY
Status: DISCONTINUED | OUTPATIENT
Start: 2020-11-23 | End: 2020-11-25 | Stop reason: HOSPADM

## 2020-11-22 RX ORDER — VITAMIN B COMPLEX
1000 TABLET ORAL DAILY
Status: DISCONTINUED | OUTPATIENT
Start: 2020-11-22 | End: 2020-11-25 | Stop reason: HOSPADM

## 2020-11-22 RX ORDER — ALLOPURINOL 300 MG/1
300 TABLET ORAL DAILY
Status: DISCONTINUED | OUTPATIENT
Start: 2020-11-22 | End: 2020-11-25 | Stop reason: HOSPADM

## 2020-11-22 RX ORDER — ASCORBIC ACID 500 MG
500 TABLET ORAL DAILY
Status: DISCONTINUED | OUTPATIENT
Start: 2020-11-22 | End: 2020-11-25 | Stop reason: HOSPADM

## 2020-11-22 RX ORDER — ONDANSETRON 2 MG/ML
4 INJECTION INTRAMUSCULAR; INTRAVENOUS ONCE
Status: COMPLETED | OUTPATIENT
Start: 2020-11-22 | End: 2020-11-22

## 2020-11-22 RX ORDER — FLUCONAZOLE 200 MG/1
400 TABLET ORAL DAILY
Status: DISCONTINUED | OUTPATIENT
Start: 2020-11-22 | End: 2020-11-25 | Stop reason: HOSPADM

## 2020-11-22 RX ORDER — ACETAMINOPHEN 325 MG/1
650 TABLET ORAL ONCE
Status: COMPLETED | OUTPATIENT
Start: 2020-11-22 | End: 2020-11-22

## 2020-11-22 RX ORDER — SODIUM CHLORIDE 9 MG/ML
INJECTION, SOLUTION INTRAVENOUS CONTINUOUS
Status: DISCONTINUED | OUTPATIENT
Start: 2020-11-22 | End: 2020-11-25 | Stop reason: HOSPADM

## 2020-11-22 RX ADMIN — OXYCODONE HYDROCHLORIDE AND ACETAMINOPHEN 500 MG: 500 TABLET ORAL at 20:25

## 2020-11-22 RX ADMIN — ALLOPURINOL 300 MG: 300 TABLET ORAL at 20:24

## 2020-11-22 RX ADMIN — ACETAMINOPHEN 650 MG: 325 TABLET ORAL at 16:28

## 2020-11-22 RX ADMIN — DEXAMETHASONE 6 MG: 4 TABLET ORAL at 20:25

## 2020-11-22 RX ADMIN — CLONIDINE HYDROCHLORIDE 0.1 MG: 0.1 TABLET ORAL at 20:25

## 2020-11-22 RX ADMIN — APIXABAN 5 MG: 5 TABLET, FILM COATED ORAL at 20:25

## 2020-11-22 RX ADMIN — Medication 50 MG: at 20:25

## 2020-11-22 RX ADMIN — ACYCLOVIR 800 MG: 800 TABLET ORAL at 20:24

## 2020-11-22 RX ADMIN — ONDANSETRON 4 MG: 2 INJECTION INTRAMUSCULAR; INTRAVENOUS at 16:28

## 2020-11-22 RX ADMIN — Medication 1000 UNITS: at 20:25

## 2020-11-22 RX ADMIN — SODIUM CHLORIDE: 9 INJECTION, SOLUTION INTRAVENOUS at 20:46

## 2020-11-22 RX ADMIN — FLUCONAZOLE 400 MG: 200 TABLET ORAL at 20:27

## 2020-11-22 ASSESSMENT — ENCOUNTER SYMPTOMS
SINUS PRESSURE: 0
COUGH: 1
CHEST TIGHTNESS: 0
SORE THROAT: 0
VOMITING: 0
EYE REDNESS: 0
BLOOD IN STOOL: 0
TROUBLE SWALLOWING: 0
WHEEZING: 0
SHORTNESS OF BREATH: 1
NAUSEA: 1
CONSTIPATION: 0
BACK PAIN: 0
ABDOMINAL PAIN: 0
SINUS PAIN: 0
EYE DISCHARGE: 0
VOMITING: 1
RHINORRHEA: 0
DIARRHEA: 1
EYE ITCHING: 0
SHORTNESS OF BREATH: 0
COLOR CHANGE: 0
EYE PAIN: 0
RHINORRHEA: 1

## 2020-11-22 ASSESSMENT — PAIN SCALES - GENERAL: PAINLEVEL_OUTOF10: 0

## 2020-11-22 NOTE — ED NOTES
ED to inpatient nurses report    Chief Complaint   Patient presents with    Loss of Consciousness     COVID+      Present to ED from home  LOC: alert and orientated to name, place, date  Vital signs   Vitals:    11/22/20 1450 11/22/20 1554 11/22/20 1629 11/22/20 1755   BP:  109/76 119/83    Pulse:  68 68 63   Resp:  18 18 18   Temp:       TempSrc:       SpO2: 93% 93% 93% 94%   Weight:          Oxygen Baseline room air    Current needs required 3 L NC   LDAs:   Peripheral IV 11/22/20 Right Antecubital (Active)   Site Assessment Clean;Dry; Intact 11/22/20 1437   Line Status Blood return noted;Specimen collected; Infusing 11/22/20 1437   Dressing Status Clean;Dry; Intact 11/22/20 1437   Dressing Intervention New 11/22/20 1437     Mobility: Requires assistance * 1  Pending ED orders: none  Present condition: stable  Person of contract sister Yevonne Goodpasture, phone number [phone]  Our promise was given to family    Electronically signed by Mare Jeffries RN on 11/22/2020 at 6:46 PM       Mare Jeffries RN  11/22/20 [address], RN  11/22/20 [address], RN  11/22/20 9944

## 2020-11-22 NOTE — ED NOTES
Bed: 039A  Expected date:   Expected time:   Means of arrival:   Comments:  [address], RN  11/22/20 4907

## 2020-11-22 NOTE — ED PROVIDER NOTES
[facility] ENCOUNTER          Pt Name: Blas Bee  MRN: [id number]  [place] 1965  Date of evaluation: 11/22/2020  Treating Resident Physician: Kathryn Edwards DO  Supervising Physician: Patsy       Chief Complaint   Patient presents with    Loss of Consciousness     COVID+     History obtained from the patient. HISTORY OF PRESENT ILLNESS    HPI  Blas Bee is a 60 y.o. male who presents to the emergency department for evaluation of presyncope and fatigue. Patient has a past medical history of leukemia and is followed by Dr. Cory Wise in [place]. He had a stem cell transplant November 12, 2019. He is still immunosuppressed with daily steroids. He was seen in this emergency department on Friday for a syncopal fall resulting in head laceration. It was at this time that he has positive for Covid. He was sent home after sutures and clear chest x-ray. Today he return to the emergency department because of worsening weakness, fatigue and an episode of presyncope. Patient states he was getting ready to the restroom when he felt like he was going to pass out feeling dizzy and weak. He denies any loss of consciousness this time. These feelings resolved after he sat down. Over the last 2 days he has had associated nausea, vomiting, diarrhea and a mildly productive cough. Patient denies any fever or chest pain. The patient has no other acute complaints at this time. REVIEW OF SYSTEMS   Review of Systems   Constitutional: Positive for activity change. Negative for chills and fever. HENT: Positive for rhinorrhea. Negative for ear discharge, ear pain, nosebleeds, postnasal drip, sinus pressure, sinus pain, sore throat and trouble swallowing. Eyes: Negative for pain, discharge and visual disturbance. Respiratory: Positive for cough. Negative for chest tightness, shortness of breath and wheezing. Cardiovascular: Negative for chest pain and palpitations. Gastrointestinal: Positive for diarrhea, nausea and vomiting. Negative for abdominal pain and constipation. Endocrine: Negative for cold intolerance and heat intolerance. Genitourinary: Negative for decreased urine volume, difficulty urinating, dysuria, flank pain and urgency. Musculoskeletal: Negative for arthralgias, back pain, neck pain and neck stiffness. Skin: Negative for color change and rash. Neurological: Positive for dizziness, weakness and light-headedness. Negative for numbness and headaches. PAST MEDICAL AND SURGICAL HISTORY     Past Medical History:   Diagnosis Date    Depression     Erectile dysfunction     Fatigue     Hyperlipidemia     Hypertension     Leukemia ([address]     Seasonal allergies     Xeroderma      Past Surgical History:   Procedure Laterality Date    COLONOSCOPY  09/09/2016    Excision of Anal Skin Tag - Dr. Endy Helms  2006    bilateral feet    HERNIA REPAIR Right 2004    excision right groin skin tag, escision of penile lesion--Dr. Wilson Able KNEE SURGERY  2010    right knee    KNEE SURGERY      2 times    NECK SURGERY      fusion    LA SPINE SURGERY PROCEDURE UNLISTED N/A 6/15/2018    L4-5 DECOMPRESSION, L4-5 POSTERIOR FUSION performed by Sigrid Alexandre MD at [address]   12/08    cervical fusion   [address] EXTRACTION Left 5-1-15    [id number] [facility]    VASECTOMY  12/07    Dr. Ana Pearson   No current facility-administered medications for this encounter.      Current Outpatient Medications:     Apixaban (ELIQUIS PO), Take by mouth 2 times daily, Disp: , Rfl:     cloNIDine (CATAPRES) 0.1 MG tablet, Take 0.1 mg by mouth daily, Disp: , Rfl:     ondansetron (ZOFRAN) 4 MG tablet, Take 1 tablet by mouth 3 times daily as needed for Nausea or Vomiting, Disp: 15 tablet, Rfl: 0    ruxolitinib phosphate (JAKAFI) 10 MG tablet, Take 10 mg by mouth 2 times daily, Disp: , Rfl:     carvedilol (COREG) 25 MG tablet, Take 25 mg by mouth 2 times daily, Disp: , Rfl:     omeprazole (PRILOSEC) 40 MG delayed release capsule, take 1 capsule by mouth once daily if needed FOR STOMACH ACID., Disp: 90 capsule, Rfl: 3    fluconazole (DIFLUCAN) 200 MG tablet, Take 400 mg by mouth daily , Disp: , Rfl:     acyclovir (ZOVIRAX) 800 MG tablet, Take 800 mg by mouth 2 times daily, Disp: , Rfl:     predniSONE (DELTASONE) 5 MG tablet, Take 5 mg by mouth daily , Disp: , Rfl:     dapsone 100 MG tablet, Take 100 mg by mouth daily, Disp: , Rfl:     allopurinol (ZYLOPRIM) 300 MG tablet, Take 300 mg by mouth daily, Disp: , Rfl:       SOCIAL HISTORY     Social History     Social History Narrative    Not on file     Social History     Tobacco Use    Smoking status: Former Smoker     Packs/day: 0.50     Years: 25.00     Pack years: 12.50     Types: Cigarettes     Last attempt to quit: 1996     Years since quittin.9    Smokeless tobacco: Never Used   Substance Use Topics    Alcohol use: Not Currently     Alcohol/week: 0.0 standard drinks     Comment: Rarely    Drug use: Yes     Types: Marijuana     Comment: sleep aid/pain relief         ALLERGIES     Allergies   Allergen Reactions    Amlodipine Swelling         FAMILY HISTORY     Family History   Problem Relation Age of Onset    Heart Disease Mother     Diabetes Mother     Heart Failure Mother     Cancer Father         Prostate/Bladder    Diabetes Brother     Heart Disease Brother     Cancer Brother         Prostate    Cancer Maternal Grandmother     Heart Disease Maternal Grandmother     Heart Disease Paternal [place]  Diabetes Sister          PREVIOUS RECORDS   Previous records reviewed: Patient was last seen in this emergency department Friday for forehead laceration secondary to syncope. He subsequently tested positive for COVID-19. Dipak [place]         PHYSICAL EXAM     ED Triage Vitals   BP Temp Temp Source Pulse Resp SpO2 Height Weight   11/22/20 1435 11/22/20 1438 11/22/20 1438 11/22/20 1435 11/22/20 1435 11/22/20 1435 -- 11/22/20 1435   97/64 100.6 °F (38.1 °C) Rectal 67 18 96 %  200 lb (90.7 kg)     Initial vital signs and nursing assessment reviewed and Febrile and hypotensive. New oxygen requirement. Additional Vital Signs:  Vitals:    11/22/20 1755   BP:    Pulse: 63   Resp: 18   Temp:    SpO2: 94%       Physical Exam  Constitutional:       General: He is not in acute distress. Appearance: Normal appearance. He is not ill-appearing or diaphoretic. HENT:      Head: Normocephalic and atraumatic. Mouth/Throat:      Mouth: Mucous membranes are moist.      Pharynx: Oropharynx is clear. No oropharyngeal exudate or posterior oropharyngeal erythema. Eyes:      Extraocular Movements: Extraocular movements intact. Conjunctiva/sclera: Conjunctivae normal.      Pupils: Pupils are equal, round, and reactive to light. Neck:      Musculoskeletal: Normal range of motion. No neck rigidity or muscular tenderness. Cardiovascular:      Rate and Rhythm: Normal rate and regular rhythm. Pulses: Normal pulses. Heart sounds: Normal heart sounds. No murmur. No friction rub. No gallop. Pulmonary:      Effort: Pulmonary effort is normal. No respiratory distress. Breath sounds: Rales (Bibasilar, right greater than left.) present. No wheezing or rhonchi. Comments: New oxygen requirement. Patient currently saturating 93% on 4 L nasal cannula. Abdominal:      Palpations: Abdomen is soft. Tenderness: There is no abdominal tenderness. There is no guarding or rebound. Musculoskeletal: Normal range of motion. General: No swelling. Right lower leg: No edema. Left lower leg: No edema. Skin:     General: Skin is warm and dry. Capillary Refill: Capillary refill takes less than 2 seconds. Findings: No bruising, erythema or lesion.       Comments: Approximate 4 cm laceration to the right frontal area. Sutures in place without erythema, induration or purulence. Neurological:      General: No focal deficit present. Mental Status: He is alert and oriented to person, place, and time. Cranial Nerves: No cranial nerve deficit. Sensory: No sensory deficit. Motor: No weakness. MEDICAL DECISION MAKING   Initial Assessment: Ill-appearing 13-year-old male laying on the cot. He is in no acute distress. Bandage to the right frontal area. Covid positive. History of stem cell transplant, leukemia and immunosuppression. Febrile and hypotensive. Differential diagnosis includes was not limited to Covid infection, pneumonia, anemia, sepsis. Less likely [id number], bacteremia, pulmonary embolus, ACS. Plan: CBC, CMP, PT/INR, CRP, D-dimer, lactate, ferritin, troponin, type and screen, lactic acid. Chest x-ray. Elevated ferritin and CRP. Negative troponin. Hemoglobin of 7.5. Admission for hypoxia and new oxygen requirement, acute kidney injury, Covid infection. Patient agreeable to this disposition and plan. Contacted Dr. Tashi Luciano for admission.       ED RESULTS   Laboratory results:  Labs Reviewed   PROTIME-INR - Abnormal; Notable for the following components:       Result Value    INR 1.57 (*)     All other components within normal limits   CBC WITH AUTO DIFFERENTIAL - Abnormal; Notable for the following components:    WBC 2.6 (*)     RBC 2.22 (*)     Hemoglobin 7.5 (*)     Hematocrit 23.5 (*)     .9 (*)     MCH 33.8 (*)     MCHC 31.9 (*)     RDW-CV 15.4 (*)     RDW-SD 59.9 (*)     Platelets 711 (*)     Segs Absolute 1.4 (*)     Lymphocytes Absolute 0.9 (*)     Monocytes Absolute 0.2 (*)     Immature Grans (Abs) 0.12 (*)     All other components within normal limits   COMPREHENSIVE METABOLIC PANEL - Abnormal; Notable for the following components:    Glucose 121 (*)     CREATININE 1.8 (*)     BUN 26 (*)     Sodium 132 (*) CO2 22 (*)     Calcium 8.2 (*)     AST 95 (*)     Total Protein 5.5 (*)     All other components within normal limits   COMPREHENSIVE METABOLIC PANEL W/ REFLEX TO MG FOR LOW K - Abnormal; Notable for the following components:    Glucose 111 (*)     CREATININE 1.6 (*)     BUN 25 (*)     Sodium 132 (*)     CO2 21 (*)     Calcium 7.7 (*)     AST 80 (*)     Total Protein 5.3 (*)     All other components within normal limits   C-REACTIVE PROTEIN - Abnormal; Notable for the following components:    CRP 10.91 (*)     All other components within normal limits   LACTATE DEHYDROGENASE - Abnormal; Notable for the following components:     (*)     All other components within normal limits   FERRITIN - Abnormal; Notable for the following components:    Ferritin 4,388 (*)     All other components within normal limits   GLOMERULAR FILTRATION RATE, ESTIMATED - Abnormal; Notable for the following components:    Est, Glom Filt Rate 39 (*)     All other components within normal limits   OSMOLALITY - Abnormal; Notable for the following components:    Osmolality Calc 270.5 (*)     All other components within normal limits   GLOMERULAR FILTRATION RATE, ESTIMATED - Abnormal; Notable for the following components:    Est, Glom Filt Rate 45 (*)     All other components within normal limits   OSMOLALITY - Abnormal; Notable for the following components:    Osmolality Calc 269.6 (*)     All other components within normal limits   APTT   D-DIMER, QUANTITATIVE   TROPONIN   LACTIC ACID, PLASMA   ANION GAP   ANION GAP   COMPREHENSIVE METABOLIC PANEL W/ REFLEX TO MG FOR LOW K   D-DIMER, QUANTITATIVE   TYPE AND SCREEN       Radiologic studies results:  XR CHEST PORTABLE   Final Result   Subsegmental atelectasis versus infiltrate right lower lobe            **This report has been created using voice recognition software. It may contain minor errors which are inherent in voice recognition technology. **      Final report electronically signed by Dr. Adrien Steele on 11/22/2020 4:15 PM          ED Medications administered this visit:   Medications   acetaminophen (TYLENOL) tablet 650 mg (650 mg Oral Given 11/22/20 1628)   ondansetron (ZOFRAN) injection 4 mg (4 mg Intravenous Given 11/22/20 1628)            MEDICATION CHANGES     New Prescriptions    No medications on file         FINAL DISPOSITION     Final diagnoses:   COVID-19     Condition: condition: stable  Dispo: Admit to telemetry      This transcription was electronically signed. Parts of this transcriptions may have been dictated by use of voice recognition software and electronically transcribed, and parts may have been transcribed with the assistance of an ED scribe. The transcription may contain errors not detected in proofreading. Please refer to my supervising physician's documentation if my documentation differs.     Electronically Signed: Galdino Cooper, 11/22/20, 7:12 PM       Galdino Cooper,   Resident  11/22/20 [id number]

## 2020-11-22 NOTE — H&P
HISTORY & PHYSICAL       Patient:  Elsie Temple  YOB: 1965    MRN: [id number]     Acct: [de-identified]    PCP: Vince Hyman MD    Date of Admission: 11/22/2020    Date of Service: Pt seen/examined on 11/22/20 and Admitted to Inpatient with expected LOS greater than two midnights due to medical therapy. ASSESSMENT:    There are no active hospital problems to display for this patient. PLAN:    Covid 19  -decadron 6 mg oral  -vit c, vit d, zinc  -flonase  -daily d-dimer  -daily cmp, cbc  -new O2 requirement of 4L, wean as tolerated  -crp and ld elevated, trop wnl, d-dimer wnl    Pneumonia  -CXR as below concerning for infiltrate vs atelectasis  -potentially covid pneumonia, however will get procal to r/o bacterial superimposed infection  -treating covid as above, will consider adding antibiotics based on procal and clinical progression    Syncopal episode  -likely due to covid infection vs symptomatic from anemia  -will continue to monitor and transfuse if necessary    Leukemia, s/p stem cell transplant 11/12/19  -on 5mg prednisone daily home dose, holding for now patient on decadron 6mg oral  -home ruxolitinib phosphate 10  -patient on eliquis 5mg bid    GREGOR, improved  -actually improved from Friday.  Baseline is normal at 1.1, Friday was 1.9, today is 1.6.  -will continue to monitor with daily cmp    Pancytopenia  -likely from recent leukemia history s/p stem cell transplant  -immunocompromised state  -currently on daily steroid and chemotherapy as above  -daily cbc, continue to monitor  -transfuse for hgb < 7.0    Hyponatremia  -likely related to dehydration (decreased oral intake)  -started on NS at 100ml/hr  -will continue to monitor    Hypocalcemia  -ical in am    Hypertension  -bps on the lower side on admission  -will restart with hold parameters in place    Depression  -restart home clonidine    Hx of CMV infection  -restarted patient's home acyclovir and dapsone  -being Fatigue     Hyperlipidemia     Hypertension     Leuk[address] ([facility] [address]     Seasonal allergies     Xeroderma        Past Surgical History:          Procedure Laterality Date    COLONOSCOPY  09/09/2016    Excision of Anal Skin Tag - Dr. Emilio Lowe  2006    bilateral feet    HERNIA REPAIR Right 2004    excision right groin skin tag, escision of penile lesion--Dr. Susy Victoria KNEE SURGERY  2010    right knee    KNEE SURGERY      2 times    NECK SURGERY      fusion    AL SPINE SURGERY PROCEDURE UNLISTED N/A 6/15/2018    L4-5 DECOMPRESSION, L4-5 POSTERIOR FUSION performed by Spring Brownlee MD at [address]   12/08    cervical fusion   [address] EXTRACTION Left 5-1-15    [id number] [facility]    VASECTOMY  12/07    Dr. Ember Barrera       Medications Prior to Admission:      Prior to Admission medications    Medication Sig Start Date End Date Taking?  Authorizing Provider   Apixaban (ELIQUIS PO) Take by mouth 2 times daily   Yes Historical Provider, MD   cloNIDine (CATAPRES) 0.1 MG tablet Take 0.1 mg by mouth daily   Yes Historical Provider, MD   ondansetron (ZOFRAN) 4 MG tablet Take 1 tablet by mouth 3 times daily as needed for Nausea or Vomiting 11/20/20  Yes Zandra Hill MD   ruxolitinib phosphate (JAKAFI) 10 MG tablet Take 10 mg by mouth 2 times daily   Yes Historical Provider, MD   carvedilol (COREG) 25 MG tablet Take 25 mg by mouth 2 times daily   Yes Historical Provider, MD   omeprazole (PRILOSEC) 40 MG delayed release capsule take 1 capsule by mouth once daily if needed FOR STOMACH ACID. 5/4/20  Yes Aruna Fernandez MD   fluconazole (DIFLUCAN) 200 MG tablet Take 400 mg by mouth daily    Yes Historical Provider, MD   acyclovir (ZOVIRAX) 800 MG tablet Take 800 mg by mouth 2 times daily   Yes Historical Provider, MD   predniSONE (DELTASONE) 5 MG tablet Take 5 mg by mouth daily    Yes Historical Provider, MD   dapsone 100 MG tablet Take 100 mg by mouth daily   Yes Historical Provider, MD   allopurinol (ZYLOPRIM) 300 MG tablet Take 300 mg by mouth daily   Yes Historical Provider, MD       Allergies:  Amlodipine    Social History:      The patient currently lives at home with family. TOBACCO:   reports that he quit smoking about 24 years ago. His smoking use included cigarettes. He has a 12.50 pack-year smoking history. He has never used smokeless tobacco.  ETOH:   reports previous alcohol use. Family History:      Reviewed in detail and negative for DM, CAD, Cancer, CVA. Positive as follows:        Problem Relation Age of Onset    Heart Disease Mother     Diabetes Mother     Heart Failure Mother     Cancer Father         Prostate/Bladder    Diabetes Brother     Heart Disease Brother     Cancer Brother         Prostate    Cancer Maternal Grandmother     Heart Disease Maternal Grandmother     Heart Disease Paternal Grandmother     Diabetes Sister        Diet:  No diet orders on file    Review of Systems   Constitutional: Positive for appetite change, fatigue and fever. Negative for chills. HENT: Negative for congestion, rhinorrhea, sinus pressure, sinus pain and sore throat. Eyes: Negative for redness and itching. Respiratory: Positive for cough and shortness of breath. Negative for wheezing. Cardiovascular: Negative for chest pain and leg swelling. Gastrointestinal: Positive for diarrhea and nausea. Negative for abdominal pain, blood in stool, constipation and vomiting. Genitourinary: Negative for difficulty urinating and hematuria. Musculoskeletal: Negative for arthralgias, back pain and myalgias. Skin: Negative for rash. Neurological: Positive for dizziness, weakness and light-headedness. Negative for headaches. Hematological: Bruises/bleeds easily. Psychiatric/Behavioral: Negative for dysphoric mood and sleep disturbance.          PHYSICAL EXAM:    /83   Pulse 63   Temp 100.6 °F (38.1 °C) (Rectal)   Resp 18   Wt 200 lb (90.7 kg)   SpO2 94%   BMI 28.70 kg/m²     Physical Exam  Constitutional:       General: He is not in acute distress. Appearance: Normal appearance. He is not ill-appearing. HENT:      Head: Normocephalic and atraumatic. Nose: Nose normal.      Comments: Nasal cannula in place     Mouth/Throat:      Mouth: Mucous membranes are moist.      Pharynx: Oropharynx is clear. Eyes:      Conjunctiva/sclera: Conjunctivae normal.      Pupils: Pupils are equal, round, and reactive to light. Neck:      Musculoskeletal: Normal range of motion and neck supple. Cardiovascular:      Rate and Rhythm: Normal rate and regular rhythm. Pulses: Normal pulses. Heart sounds: No murmur. Pulmonary:      Breath sounds: Rales present. No wheezing. Comments: Moving air well. Bilateral rales lower lung fields moreso right sided. New O2 requirement of 4L  Abdominal:      General: Abdomen is flat. Bowel sounds are normal. There is no distension. Palpations: Abdomen is soft. Tenderness: There is no abdominal tenderness. Musculoskeletal: Normal range of motion. Right lower leg: No edema. Left lower leg: No edema. Skin:     General: Skin is warm and dry. Capillary Refill: Capillary refill takes 2 to 3 seconds. Findings: Lesion present. No rash. Comments: Approximate 4 cm laceration to the right frontal area head. Sutures in place without erythema, induration or purulence. Well healing. Neurological:      General: No focal deficit present. Mental Status: He is alert and oriented to person, place, and time. Mental status is at baseline.    Psychiatric:         Mood and Affect: Mood normal.           Labs:     Recent Labs     11/20/20  1434 11/22/20  1500   WBC 3.7* 2.6*   HGB 8.0* 7.5*   HCT 24.4* 23.5*   * 103*     Recent Labs     11/20/20  1434 11/22/20  1500 11/22/20  1623   * 132* 132*   K 4.0 4.1 4.2   CL  --  98 100   CO2  --  22* 21*   BUN  --  26* 25*

## 2020-11-22 NOTE — ED NOTES
Sister Layton Cranker called at this time, message left. No answer.       Loren Platt RN  11/22/20 0486

## 2020-11-22 NOTE — ED TRIAGE NOTES
Pt to the ED s/p passing out today at home. Pt alert and oriented. Remembers going to the restroom and feeling lightheaded, nauseous, and fatigued and then the sister who called the squad states the pt had a seizure. Pt denies feeling like a seizure happened, but felt as if he passed out. Denies pain. Tested COVID+ Friday here. Very fatigued. Pt was 85% on room air upon arrival. Started at 6 L NC to obtain a normal POx. Now on 4 L. Orthostatic BP obtained laying to sitting. Pt became nauseated and warm upon sitting up. Could not stand. States he has been nauseated and threw up multiple times since Friday but none today. EKG completed. IV in place with lab drawn. Fluids infusing.  Awaiting provider eval.

## 2020-11-23 ENCOUNTER — APPOINTMENT (OUTPATIENT)
Dept: INTERVENTIONAL RADIOLOGY/VASCULAR | Age: 55
DRG: 137 | End: 2020-11-23
Payer: COMMERCIAL

## 2020-11-23 ENCOUNTER — APPOINTMENT (OUTPATIENT)
Dept: GENERAL RADIOLOGY | Age: 55
DRG: 137 | End: 2020-11-23
Payer: COMMERCIAL

## 2020-11-23 LAB
ALBUMIN SERPL-MCNC: 3.5 G/DL (ref 3.5–5.1)
ALLEN TEST: POSITIVE
ALP BLD-CCNC: 94 U/L (ref 38–126)
ALT SERPL-CCNC: 26 U/L (ref 11–66)
ANION GAP SERPL CALCULATED.3IONS-SCNC: 13 MEQ/L (ref 8–16)
APTT: 29.5 SECONDS (ref 22–38)
AST SERPL-CCNC: 48 U/L (ref 5–40)
BASE EXCESS (CALCULATED): -4.7 MMOL/L (ref -2.5–2.5)
BASOPHILS # BLD: 0 %
BASOPHILS ABSOLUTE: 0 THOU/MM3 (ref 0–0.1)
BILIRUB SERPL-MCNC: 0.4 MG/DL (ref 0.3–1.2)
BUN BLDV-MCNC: 27 MG/DL (ref 7–22)
CALCIUM IONIZED: 1.04 MMOL/L (ref 1.12–1.32)
CALCIUM SERPL-MCNC: 8.2 MG/DL (ref 8.5–10.5)
CHLORIDE BLD-SCNC: 102 MEQ/L (ref 98–111)
CO2: 20 MEQ/L (ref 23–33)
COLLECTED BY:: ABNORMAL
CREAT SERPL-MCNC: 1.4 MG/DL (ref 0.4–1.2)
D-DIMER QUANTITATIVE: 463 NG/ML FEU (ref 0–500)
DEVICE: ABNORMAL
EOSINOPHIL # BLD: 0 %
EOSINOPHILS ABSOLUTE: 0 THOU/MM3 (ref 0–0.4)
ERYTHROCYTE [DISTWIDTH] IN BLOOD BY AUTOMATED COUNT: 15.6 % (ref 11.5–14.5)
ERYTHROCYTE [DISTWIDTH] IN BLOOD BY AUTOMATED COUNT: 60.6 FL (ref 35–45)
FIBRINOGEN: 467 MG/100ML (ref 155–475)
GFR SERPL CREATININE-BSD FRML MDRD: 52 ML/MIN/1.73M2
GLUCOSE BLD-MCNC: 139 MG/DL (ref 70–108)
GLUCOSE BLD-MCNC: 139 MG/DL (ref 70–108)
GLUCOSE BLD-MCNC: 149 MG/DL (ref 70–108)
GLUCOSE BLD-MCNC: 173 MG/DL (ref 70–108)
HCO3: 20 MMOL/L (ref 23–28)
HCT VFR BLD CALC: 22 % (ref 42–52)
HCT VFR BLD CALC: 24.7 % (ref 42–52)
HEMOGLOBIN: 7 GM/DL (ref 14–18)
HEMOGLOBIN: 7.8 GM/DL (ref 14–18)
IFIO2: 50
IMMATURE GRANS (ABS): 0.13 THOU/MM3 (ref 0–0.07)
IMMATURE GRANULOCYTES: 7.1 %
LEGIONELLA PNEUMOPHILIA AG, URINE: NEGATIVE
LYMPHOCYTES # BLD: 21.7 %
LYMPHOCYTES ABSOLUTE: 0.4 THOU/MM3 (ref 1–4.8)
MCH RBC QN AUTO: 34.1 PG (ref 26–33)
MCHC RBC AUTO-ENTMCNC: 31.8 GM/DL (ref 32.2–35.5)
MCV RBC AUTO: 107.3 FL (ref 80–94)
MONOCYTES # BLD: 6.5 %
MONOCYTES ABSOLUTE: 0.1 THOU/MM3 (ref 0.4–1.3)
NUCLEATED RED BLOOD CELLS: 0 /100 WBC
O2 SATURATION: 93 %
OSMOLALITY CALCULATION: 277.5 MOSMOL/KG (ref 275–300)
PCO2: 34 MMHG (ref 35–45)
PH BLOOD GAS: 7.38 (ref 7.35–7.45)
PLATELET # BLD: 95 THOU/MM3 (ref 130–400)
PLATELET ESTIMATE: ABNORMAL
PMV BLD AUTO: 10.3 FL (ref 9.4–12.4)
PO2: 66 MMHG (ref 71–104)
POTASSIUM REFLEX MAGNESIUM: 5.1 MEQ/L (ref 3.5–5.2)
PROCALCITONIN: 0.23 NG/ML (ref 0.01–0.09)
RBC # BLD: 2.05 MILL/MM3 (ref 4.7–6.1)
SCAN OF BLOOD SMEAR: NORMAL
SEG NEUTROPHILS: 64.7 %
SEGMENTED NEUTROPHILS ABSOLUTE COUNT: 1.2 THOU/MM3 (ref 1.8–7.7)
SODIUM BLD-SCNC: 135 MEQ/L (ref 135–145)
SOURCE, BLOOD GAS: ABNORMAL
STREP PNEUMO AG, UR: NEGATIVE
TOTAL PROTEIN: 5.4 G/DL (ref 6.1–8)
WBC # BLD: 1.8 THOU/MM3 (ref 4.8–10.8)

## 2020-11-23 PROCEDURE — 82330 ASSAY OF CALCIUM: CPT

## 2020-11-23 PROCEDURE — 2580000003 HC RX 258: Performed by: STUDENT IN AN ORGANIZED HEALTH CARE EDUCATION/TRAINING PROGRAM

## 2020-11-23 PROCEDURE — 84145 PROCALCITONIN (PCT): CPT

## 2020-11-23 PROCEDURE — 85025 COMPLETE CBC W/AUTO DIFF WBC: CPT

## 2020-11-23 PROCEDURE — 85014 HEMATOCRIT: CPT

## 2020-11-23 PROCEDURE — 82803 BLOOD GASES ANY COMBINATION: CPT

## 2020-11-23 PROCEDURE — 36600 WITHDRAWAL OF ARTERIAL BLOOD: CPT

## 2020-11-23 PROCEDURE — 36430 TRANSFUSION BLD/BLD COMPNT: CPT

## 2020-11-23 PROCEDURE — 80053 COMPREHEN METABOLIC PANEL: CPT

## 2020-11-23 PROCEDURE — 93307 TTE W/O DOPPLER COMPLETE: CPT

## 2020-11-23 PROCEDURE — 99252 IP/OBS CONSLTJ NEW/EST SF 35: CPT | Performed by: PSYCHIATRY & NEUROLOGY

## 2020-11-23 PROCEDURE — 85379 FIBRIN DEGRADATION QUANT: CPT

## 2020-11-23 PROCEDURE — 85730 THROMBOPLASTIN TIME PARTIAL: CPT

## 2020-11-23 PROCEDURE — 2700000000 HC OXYGEN THERAPY PER DAY

## 2020-11-23 PROCEDURE — 71045 X-RAY EXAM CHEST 1 VIEW: CPT

## 2020-11-23 PROCEDURE — 99223 1ST HOSP IP/OBS HIGH 75: CPT | Performed by: INTERNAL MEDICINE

## 2020-11-23 PROCEDURE — 99232 SBSQ HOSP IP/OBS MODERATE 35: CPT | Performed by: FAMILY MEDICINE

## 2020-11-23 PROCEDURE — 82948 REAGENT STRIP/BLOOD GLUCOSE: CPT

## 2020-11-23 PROCEDURE — 1200000003 HC TELEMETRY R&B

## 2020-11-23 PROCEDURE — 6370000000 HC RX 637 (ALT 250 FOR IP): Performed by: STUDENT IN AN ORGANIZED HEALTH CARE EDUCATION/TRAINING PROGRAM

## 2020-11-23 PROCEDURE — 94761 N-INVAS EAR/PLS OXIMETRY MLT: CPT

## 2020-11-23 PROCEDURE — 2580000003 HC RX 258: Performed by: FAMILY MEDICINE

## 2020-11-23 PROCEDURE — 93880 EXTRACRANIAL BILAT STUDY: CPT

## 2020-11-23 PROCEDURE — 85385 FIBRINOGEN ANTIGEN: CPT

## 2020-11-23 PROCEDURE — 6360000002 HC RX W HCPCS: Performed by: STUDENT IN AN ORGANIZED HEALTH CARE EDUCATION/TRAINING PROGRAM

## 2020-11-23 PROCEDURE — 36415 COLL VENOUS BLD VENIPUNCTURE: CPT

## 2020-11-23 PROCEDURE — 85018 HEMOGLOBIN: CPT

## 2020-11-23 RX ORDER — 0.9 % SODIUM CHLORIDE 0.9 %
20 INTRAVENOUS SOLUTION INTRAVENOUS ONCE
Status: COMPLETED | OUTPATIENT
Start: 2020-11-23 | End: 2020-11-24

## 2020-11-23 RX ORDER — ONDANSETRON 2 MG/ML
4 INJECTION INTRAMUSCULAR; INTRAVENOUS EVERY 6 HOURS PRN
Status: DISCONTINUED | OUTPATIENT
Start: 2020-11-23 | End: 2020-11-25 | Stop reason: HOSPADM

## 2020-11-23 RX ORDER — 0.9 % SODIUM CHLORIDE 0.9 %
20 INTRAVENOUS SOLUTION INTRAVENOUS ONCE
Status: COMPLETED | OUTPATIENT
Start: 2020-11-23 | End: 2020-11-23

## 2020-11-23 RX ADMIN — PANTOPRAZOLE SODIUM 40 MG: 40 TABLET, DELAYED RELEASE ORAL at 08:08

## 2020-11-23 RX ADMIN — SODIUM CHLORIDE: 9 INJECTION, SOLUTION INTRAVENOUS at 07:26

## 2020-11-23 RX ADMIN — DEXAMETHASONE 6 MG: 4 TABLET ORAL at 09:50

## 2020-11-23 RX ADMIN — ACYCLOVIR 800 MG: 800 TABLET ORAL at 13:03

## 2020-11-23 RX ADMIN — CARVEDILOL 25 MG: 25 TABLET, FILM COATED ORAL at 20:54

## 2020-11-23 RX ADMIN — APIXABAN 5 MG: 5 TABLET, FILM COATED ORAL at 13:03

## 2020-11-23 RX ADMIN — SODIUM CHLORIDE 20 ML: 9 INJECTION, SOLUTION INTRAVENOUS at 16:06

## 2020-11-23 RX ADMIN — ALLOPURINOL 300 MG: 300 TABLET ORAL at 13:03

## 2020-11-23 RX ADMIN — Medication 50 MG: at 13:02

## 2020-11-23 RX ADMIN — FLUCONAZOLE 400 MG: 200 TABLET ORAL at 09:50

## 2020-11-23 RX ADMIN — CARVEDILOL 25 MG: 25 TABLET, FILM COATED ORAL at 13:03

## 2020-11-23 RX ADMIN — DAPSONE 100 MG: 25 TABLET ORAL at 13:02

## 2020-11-23 RX ADMIN — CLONIDINE HYDROCHLORIDE 0.1 MG: 0.1 TABLET ORAL at 09:51

## 2020-11-23 RX ADMIN — APIXABAN 5 MG: 5 TABLET, FILM COATED ORAL at 20:54

## 2020-11-23 RX ADMIN — OXYCODONE HYDROCHLORIDE AND ACETAMINOPHEN 500 MG: 500 TABLET ORAL at 13:02

## 2020-11-23 RX ADMIN — ACYCLOVIR 800 MG: 800 TABLET ORAL at 20:54

## 2020-11-23 RX ADMIN — Medication 1000 UNITS: at 13:02

## 2020-11-23 ASSESSMENT — PAIN SCALES - GENERAL
PAINLEVEL_OUTOF10: 0
PAINLEVEL_OUTOF10: 0

## 2020-11-23 ASSESSMENT — PAIN DESCRIPTION - PAIN TYPE: TYPE: ACUTE PAIN

## 2020-11-23 NOTE — ED NOTES
Pt to room 39. Pt changed to inpatient bed, resting comfortably. Respirations unlabored on 3L NC. Call light within reach. Will continue to monitor.      Toyin Wise RN  11/22/20 1923

## 2020-11-23 NOTE — PROGRESS NOTES
Hospitalist Progress Note    Patient:  Candelario Velazquez      Unit/Bed:3B-34/034-A    YOB: 1965    MRN: [id number]       Acct: [de-identified]     PCP: Amy Flores MD    Date of Admission: 11/22/2020    Chief Complaint: syncope    Hospital Course:     Per H/P note:    \"Titus Dalal is a 60 y. o. male who presents to the emergency department for evaluation of presyncope and fatigue.  Patient has a past medical history of leukemia and is followed by Dr. Deidra Henson in [address] had a stem cell transplant November 12, 2019. [facility] is still immunosuppressed with daily steroids (5mg prednisone).       Prior to presentation on Friday he was feeling bad starting on Monday. On Tuesday Wednesday he started having nausea/vomiting/diarrea. He presented to ED Friday because of Fall. Had a head laceration repaired but required no oxygen and CXR was clear at that time. Since Friday has been feeling pretty bad. Saturday morning, yesterday and today he has had increased nausea, no vomiting since Friday. Hasn't had a fever at home. Has been trying to eat broth but hasn't been able to eat much due to nausea. He has been having diarrhea since Tuesday. Last bowel movement was diarrhea today. No blood in stools.      He was seen in this emergency department on Friday for a syncopal fall resulting in head laceration.  It was at this time that he has positive for Edmund Curling was sent home after sutures and clear chest x-ray. Today he return to the emergency department because of worsening weakness, fatigue and an episode of presyncope.  Patient states he was getting ready to the restroom when he felt like he was going to pass out feeling dizzy and weak.  He denies any loss of consciousness this time.  These feelings resolved after he sat down.  Over the last 2 days he has had associated nausea, vomiting, diarrhea and a mildly productive cough. When he takes deep breaths that's when the cough is active.  Patient denies any fever or chest pain. Per ED team, head lac is healing appropriately. \"     Subjective:     Patient seen and examined. Pt denies sob, chest pain, abd pain, N/V, fever, chills, dizziness. Last BM was yesterday, had watery, non-bloody stool. Still coughing      Medications:  Reviewed    Infusion Medications    sodium chloride 100 mL/hr at 11/23/20 6936     Scheduled Medications    dexamethasone  6 mg Oral Daily    Vitamin D  1,000 Units Oral Daily    vitamin C  500 mg Oral Daily    zinc sulfate  50 mg Oral Daily    fluticasone  1 spray Each Nostril Daily    acyclovir  800 mg Oral BID    allopurinol  300 mg Oral Daily    apixaban  5 mg Oral BID    carvedilol  25 mg Oral BID    cloNIDine  0.1 mg Oral Daily    dapsone  100 mg Oral Daily    fluconazole  400 mg Oral Daily    pantoprazole  40 mg Oral QAM AC    ruxolitinib phosphate  10 mg Oral BID     PRN Meds: acetaminophen **OR** acetaminophen, guaiFENesin-dextromethorphan      Intake/Output Summary (Last 24 hours) at 11/23/2020 1031  Last data filed at 11/23/2020 0600  Gross per 24 hour   Intake 940 ml   Output 350 ml   Net 590 ml       Diet:  DIET CARDIAC; Low Sodium (2 GM)    Exam:  /80   Pulse 54   Temp 97.9 °F (36.6 °C) (Oral)   Resp 16   Ht 5' 10\" (1.778 m)   Wt 202 lb 6.4 oz (91.8 kg)   SpO2 95%   BMI 29.04 kg/m²     General appearance: alert, not in acute distress  HEENT: PERRLA  Chest: on venturi mask, 15 lpm O2,(+) fine rales on left mid to lower lung fields, no rhonchi, no wheezing    CVS exam: normal rate, regular rhythm, normal S1, S2, no murmurs, rubs, clicks or gallops. Abdominal exam: non-distended, NABS, soft, non-tender, no guarding, no masses or organomegaly.   Neurological exam reveals alert, oriented, normal speech, no focal findings or movement disorder noted  Exam of extremities: peripheral pulses normal, no pedal or leg edema, no clubbing or cyanosis        Labs:   Recent Labs     11/20/20  1434 11/22/20  1500   WBC with this.   -Continue vitamin C, vitamin D and zinc   -already on eliquis for hx left leg DVT   -Procalcitonin not significantly elevated, no indication for antibiotics  -Check D-dimer  -Contact and droplet precaution    Loss of consciousness/syncope, likely due to dehydration/hypoperfusion, rule out other pathology    -Patient reports diarrhea, nausea and vomiting prior having loss of consciousness  -CT head on arrival no acute findings  -Ordered bilateral carotid venous ultrasound  -Patient had echocardiogram in July 2020 in [place] EF of 51%, left ventricular concentric hypertrophy, low normal to mild global hypokinesis with low normal to mild decrease in systolic function  -Limited echocardiogram  -Orthostatic vital signs    GREGOR on CKD stage II    -Creatinine 1.8 on arrival, baseline creatinine 0.9-1.1, now down to 1.4.  -Continue IV fluids  -Avoid nephrotoxins  -BMP in a.m. Diarrhea due to COVID-19 infection    -lactobacillus     Mild hyponatremia, likely due to dehydration due to N/V/D, resolved     -Improved with IV fluids  -BMP in a.m. Isolated AST elevation    -Repeat LFTs in a.m. Acute on chronic macrocytic anemia    -Hemoglobin dropped to 7.0 from 7.5 on arrival   --Baseline hemoglobin around 7.2-9.8  -transfuse 1 unit PRBC  -CBC in am     Pancytopenia, worsening    -Chronic pancytopenia likely from recent leukemia history s/p stem cell transplant.   Worsening likely due to COVID-19 infection  -immunocompromised state  -currently on daily steroid and chemotherapy as above  -daily cbc, continue to monitor  -transfuse for hgb < 7.0     Hx of left leg DVT    -cont eliquis    Hypocalcemia     -check ical  -albumin mildly low      Nonanion gap metabolic acidosis    -start sodium bicarbonate oral     Immunocompromised state secondary to leukemia     Leukemia, s/p stem cell transplant 11/12/19    -on 5mg prednisone daily home dose, holding for now patient on decadron 6mg oral  -cont home ruxolitinib phosphate 10    Essential hypertension    -bps on the lower side on admission  -cont clonidine, coreg      Hx of Depression    -restart home clonidine     Hx of CMV infection    -restarted patient's home acyclovir and dapsone   -being worked up by [employer] (CMV test pending)      Anticipated Discharge in : pending         Diet: DIET CARDIAC; Low Sodium (2 GM)    DVT prophylaxis: [] Lovenox                                 [] SCDs                                 [] SQ Heparin                                 [] Encourage ambulation           [x] Already on Anticoagulation     Disposition:    [] Home       [] TCU       [] Rehab       [] Psych       [] SNF       [] [place]       [x] Other-Plan as above       Code Status: Prior      Electronically signed by Isela Louise MD on 11/23/2020 at 10:31 AM

## 2020-11-23 NOTE — CARE COORDINATION
DISASTER CHARTING    11/23/20, 9:53 AM EST    DISCHARGE ONGOING EVALUATION:     Wm Dacosta day: 1  Location: [address] [place] [address] Reason for admit: COVID-19 [U07.1]  COVID-19 [U07.1]   Barriers to Discharge: IVF. Decadron. Diflucan. Zinc. Placed on venturi mask, 12L. PCP: Vince Hyman MD  Patient Goals/Plan/Treatment Preferences: Attempted to speak with patient, nurse and respiratory in room, worsening respiratory status. Will round again later today if improved.

## 2020-11-23 NOTE — DISCHARGE SUMMARY
[facility]  [address] [place] [address]    Patient Name: Kaylee Macdonald        CSN: [id number]   YOB: 1965  Gender: male  Michele Cortez NP,    No admission diagnoses are documented for this encounter. ,      Patient is discharged from Physical Therapy services at this time. See last note for details related to results of therapy and goal achievement. Reason for discharge: patient had been ill last week, c/c since 11/12. COVID testing on 11/20- positive and in hospital on 11/22, will discharge at this time. Zina Castillo.  Mani Carver # [phone]

## 2020-11-23 NOTE — ED NOTES
ED nurse-to-nurse bedside report    Chief Complaint   Patient presents with    Loss of Consciousness     COVID+      LOC: alert and orientated to name, place, date  Vital signs   Vitals:    11/22/20 1923 11/22/20 2024 11/22/20 2047 11/22/20 2244   BP: 101/71 98/73 98/73 101/68   Pulse: 61  62 64   Resp: 18  16 17   Temp:       TempSrc:       SpO2:   92% 92%   Weight:          Pain:  0  Pain Interventions: comfort  Pain Goal: 0  Oxygen: Yes    Current needs required 3L NC   Telemetry: Yes  LDAs:   Peripheral IV 11/22/20 Right Antecubital (Active)   Site Assessment Clean;Dry; Intact 11/22/20 2244   Line Status Infusing 11/22/20 2244   Dressing Status Clean;Dry; Intact 11/22/20 2244   Dressing Intervention New 11/22/20 1437     Continuous Infusions:    sodium chloride 100 mL/hr at 11/22/20 2046     Mobility: Requires assistance * 1  Maxwell Fall Risk Score: No flowsheet data found.   Fall Interventions: call light in reach  Report given to: [facility]  11/22/20 0086

## 2020-11-23 NOTE — FLOWSHEET NOTE
Pt requested transfer to [address] [place] [address], he said all his doctors are in [address] [place] [address] and he wanted to be transferred there. I initiated the transfer per pt's request. I spoke with Sung Harry RN of transfer center. Awaiting call back from [address] [place] [address]. Also pt wanted me to talk to his oncologist regarding the convalescent plasma if his oncologist is okay with it. I tried to call Dr. Amor Daniel ( oncologist in [facility]) however she's not available, awaiting callback from Dr. Amor Daniel. Electronically signed by Aida Wong MD on 11/23/2020 at 4:16 PM     Addendum:    I spoke with Dr. Amor Daniel of [facility] ( pt's oncologist) over the phone, Dr. Amor Daniel is okay to give the pt convalescent plasma. Electronically signed by Aida Wong MD on 11/23/2020 at 4:43 PM     Addendum:    I spoke with Syed Barrientos of [address] [place] [address] transfer center and discussed the case with her, and per Syed Barrientos, pt will need ICU bed because of high flow O2 and per Syed Barrientos, no available [facility] ICU bed right now and may take couple days or more.      Electronically signed by Aida Wong MD on 11/23/2020 at 5:11 PM

## 2020-11-23 NOTE — FLOWSHEET NOTE
0335 Pt admitted to  3B-34 in a wheelchair. Complaints: Shortness of breath. IV normal saline infusing into the antecubital right, condition patent and no redness at a rate of 100 mls/ hour with about 500 mls in the bag still. IV site free of s/s of infection or infiltration. Vital signs obtained. Assessment and data collection initiated. Two nurse skin assessment performed by Brendan Galindo and George Le. Oriented to room. Policies and procedures for 3B explained. Alex Leung RN discussed hourly rounding with patient addressing 5 P's. Fall prevention and safety brochure discussed with patient. Bed alarm on. Call light in reach. Explained patients right to have family, representative or physician notified of their admission. Patient has Declined for physician to be notified. Patient has Declined for family/representative to be notified. All questions answered with no further questions at this time. Please see flowsheets for admission data.

## 2020-11-23 NOTE — ED NOTES
Pt resting in bed with eyes closed, respirations unlabored on 3L NC. Will continue to monitor.      John Gonsales RN  11/22/20 6767

## 2020-11-23 NOTE — CONSULTS
Syncope due to DHN, G/E  DHN  GREGOR on CKD baseline CR 1.1  And  ( 1.8 on admission)  Gastroenteritis  COVID 19  Hypoxic resp failure    Plan  2 weeks Event monitor  on D/c  Echo as out pat  Functional study as out pat  F/u cardiology as out pat      [phone]      Ivette Veliz MD

## 2020-11-23 NOTE — ED NOTES
Patient resting in bed in a right side position. Pt assisted with positioning. Pt denies further needs. Vital signs stable and respirations unlabored.       Adán Steiner RN  11/23/20 9130

## 2020-11-24 ENCOUNTER — HOSPITAL ENCOUNTER (OUTPATIENT)
Dept: PHYSICAL THERAPY | Age: 55
Setting detail: THERAPIES SERIES
Discharge: HOME | End: 2020-11-24
Payer: COMMERCIAL

## 2020-11-24 LAB
ALBUMIN SERPL-MCNC: 3.3 G/DL (ref 3.5–5.1)
ALLEN TEST: POSITIVE
ALP BLD-CCNC: 97 U/L (ref 38–126)
ALT SERPL-CCNC: 25 U/L (ref 11–66)
ANION GAP SERPL CALCULATED.3IONS-SCNC: 13 MEQ/L (ref 8–16)
ANISOCYTOSIS: PRESENT
AST SERPL-CCNC: 48 U/L (ref 5–40)
BASE EXCESS (CALCULATED): -2.5 MMOL/L (ref -2.5–2.5)
BASE EXCESS (CALCULATED): -3.1 MMOL/L (ref -2.5–2.5)
BASOPHILS # BLD: 0 %
BASOPHILS ABSOLUTE: 0 THOU/MM3 (ref 0–0.1)
BILIRUB SERPL-MCNC: 0.4 MG/DL (ref 0.3–1.2)
BUN BLDV-MCNC: 32 MG/DL (ref 7–22)
CALCIUM SERPL-MCNC: 8.3 MG/DL (ref 8.5–10.5)
CHLORIDE BLD-SCNC: 103 MEQ/L (ref 98–111)
CO2: 19 MEQ/L (ref 23–33)
COLLECTED BY:: ABNORMAL
COLLECTED BY:: ABNORMAL
CREAT SERPL-MCNC: 1.5 MG/DL (ref 0.4–1.2)
D-DIMER QUANTITATIVE: 407 NG/ML FEU (ref 0–500)
DEVICE: ABNORMAL
DEVICE: ABNORMAL
EOSINOPHIL # BLD: 0 %
EOSINOPHILS ABSOLUTE: 0 THOU/MM3 (ref 0–0.4)
ERYTHROCYTE [DISTWIDTH] IN BLOOD BY AUTOMATED COUNT: 18.2 % (ref 11.5–14.5)
ERYTHROCYTE [DISTWIDTH] IN BLOOD BY AUTOMATED COUNT: 68.3 FL (ref 35–45)
GFR SERPL CREATININE-BSD FRML MDRD: 48 ML/MIN/1.73M2
GLUCOSE BLD-MCNC: 139 MG/DL (ref 70–108)
HCO3: 20 MMOL/L (ref 23–28)
HCO3: 20 MMOL/L (ref 23–28)
HCT VFR BLD CALC: 26.7 % (ref 42–52)
HEMOGLOBIN: 8.5 GM/DL (ref 14–18)
IFIO2: 100
IFIO2: 80
IMMATURE GRANS (ABS): 0.24 THOU/MM3 (ref 0–0.07)
IMMATURE GRANULOCYTES: 7.4 %
LYMPHOCYTES # BLD: 12 %
LYMPHOCYTES ABSOLUTE: 0.4 THOU/MM3 (ref 1–4.8)
MCH RBC QN AUTO: 32.7 PG (ref 26–33)
MCHC RBC AUTO-ENTMCNC: 31.8 GM/DL (ref 32.2–35.5)
MCV RBC AUTO: 102.7 FL (ref 80–94)
MODE: ABNORMAL
MONOCYTES # BLD: 5.5 %
MONOCYTES ABSOLUTE: 0.2 THOU/MM3 (ref 0.4–1.3)
NUCLEATED RED BLOOD CELLS: 0 /100 WBC
O2 SATURATION: 88 %
O2 SATURATION: 98 %
PCO2: 25 MMHG (ref 35–45)
PCO2: 29 MMHG (ref 35–45)
PH BLOOD GAS: 7.46 (ref 7.35–7.45)
PH BLOOD GAS: 7.52 (ref 7.35–7.45)
PLATELET # BLD: 108 THOU/MM3 (ref 130–400)
PMV BLD AUTO: 10.2 FL (ref 9.4–12.4)
PO2: 52 MMHG (ref 71–104)
PO2: 94 MMHG (ref 71–104)
POTASSIUM REFLEX MAGNESIUM: 4.4 MEQ/L (ref 3.5–5.2)
PROCALCITONIN: 0.18 NG/ML (ref 0.01–0.09)
RBC # BLD: 2.6 MILL/MM3 (ref 4.7–6.1)
SEG NEUTROPHILS: 75.1 %
SEGMENTED NEUTROPHILS ABSOLUTE COUNT: 2.5 THOU/MM3 (ref 1.8–7.7)
SET PEEP: 10 MMHG
SODIUM BLD-SCNC: 135 MEQ/L (ref 135–145)
SOURCE, BLOOD GAS: ABNORMAL
SOURCE, BLOOD GAS: ABNORMAL
TOTAL PROTEIN: 5.2 G/DL (ref 6.1–8)
WBC # BLD: 3.3 THOU/MM3 (ref 4.8–10.8)

## 2020-11-24 PROCEDURE — 82803 BLOOD GASES ANY COMBINATION: CPT

## 2020-11-24 PROCEDURE — 84145 PROCALCITONIN (PCT): CPT

## 2020-11-24 PROCEDURE — 6370000000 HC RX 637 (ALT 250 FOR IP): Performed by: FAMILY MEDICINE

## 2020-11-24 PROCEDURE — 80053 COMPREHEN METABOLIC PANEL: CPT

## 2020-11-24 PROCEDURE — 6360000002 HC RX W HCPCS: Performed by: STUDENT IN AN ORGANIZED HEALTH CARE EDUCATION/TRAINING PROGRAM

## 2020-11-24 PROCEDURE — 2700000000 HC OXYGEN THERAPY PER DAY

## 2020-11-24 PROCEDURE — 2580000003 HC RX 258: Performed by: FAMILY MEDICINE

## 2020-11-24 PROCEDURE — 94761 N-INVAS EAR/PLS OXIMETRY MLT: CPT

## 2020-11-24 PROCEDURE — 6370000000 HC RX 637 (ALT 250 FOR IP): Performed by: STUDENT IN AN ORGANIZED HEALTH CARE EDUCATION/TRAINING PROGRAM

## 2020-11-24 PROCEDURE — 94660 CPAP INITIATION&MGMT: CPT

## 2020-11-24 PROCEDURE — 2580000003 HC RX 258: Performed by: STUDENT IN AN ORGANIZED HEALTH CARE EDUCATION/TRAINING PROGRAM

## 2020-11-24 PROCEDURE — 85379 FIBRIN DEGRADATION QUANT: CPT

## 2020-11-24 PROCEDURE — 36600 WITHDRAWAL OF ARTERIAL BLOOD: CPT

## 2020-11-24 PROCEDURE — 1200000003 HC TELEMETRY R&B

## 2020-11-24 PROCEDURE — 85025 COMPLETE CBC W/AUTO DIFF WBC: CPT

## 2020-11-24 PROCEDURE — 36415 COLL VENOUS BLD VENIPUNCTURE: CPT

## 2020-11-24 PROCEDURE — 99232 SBSQ HOSP IP/OBS MODERATE 35: CPT | Performed by: FAMILY MEDICINE

## 2020-11-24 RX ORDER — LACTOBACILLUS RHAMNOSUS GG 10B CELL
1 CAPSULE ORAL
Status: DISCONTINUED | OUTPATIENT
Start: 2020-11-25 | End: 2020-11-25 | Stop reason: HOSPADM

## 2020-11-24 RX ORDER — SODIUM BICARBONATE 650 MG/1
650 TABLET ORAL 2 TIMES DAILY
Status: DISCONTINUED | OUTPATIENT
Start: 2020-11-24 | End: 2020-11-25 | Stop reason: HOSPADM

## 2020-11-24 RX ADMIN — DEXAMETHASONE 6 MG: 4 TABLET ORAL at 08:29

## 2020-11-24 RX ADMIN — FLUCONAZOLE 400 MG: 200 TABLET ORAL at 08:26

## 2020-11-24 RX ADMIN — APIXABAN 5 MG: 5 TABLET, FILM COATED ORAL at 21:07

## 2020-11-24 RX ADMIN — SODIUM CHLORIDE: 9 INJECTION, SOLUTION INTRAVENOUS at 14:46

## 2020-11-24 RX ADMIN — PANTOPRAZOLE SODIUM 40 MG: 40 TABLET, DELAYED RELEASE ORAL at 08:30

## 2020-11-24 RX ADMIN — ACYCLOVIR 800 MG: 800 TABLET ORAL at 21:07

## 2020-11-24 RX ADMIN — ACYCLOVIR 800 MG: 800 TABLET ORAL at 08:29

## 2020-11-24 RX ADMIN — CARVEDILOL 25 MG: 25 TABLET, FILM COATED ORAL at 21:07

## 2020-11-24 RX ADMIN — ALLOPURINOL 300 MG: 300 TABLET ORAL at 08:27

## 2020-11-24 RX ADMIN — APIXABAN 5 MG: 5 TABLET, FILM COATED ORAL at 08:28

## 2020-11-24 RX ADMIN — DAPSONE 100 MG: 25 TABLET ORAL at 08:27

## 2020-11-24 RX ADMIN — SODIUM BICARBONATE 650 MG: 650 TABLET ORAL at 21:07

## 2020-11-24 RX ADMIN — Medication 50 MG: at 08:28

## 2020-11-24 RX ADMIN — SODIUM BICARBONATE 650 MG: 650 TABLET ORAL at 11:04

## 2020-11-24 RX ADMIN — ACETAMINOPHEN 650 MG: 325 TABLET ORAL at 23:52

## 2020-11-24 RX ADMIN — ACETAMINOPHEN 650 MG: 325 TABLET ORAL at 03:37

## 2020-11-24 RX ADMIN — SODIUM CHLORIDE 20 ML: 9 INJECTION, SOLUTION INTRAVENOUS at 01:29

## 2020-11-24 RX ADMIN — CLONIDINE HYDROCHLORIDE 0.1 MG: 0.1 TABLET ORAL at 08:27

## 2020-11-24 RX ADMIN — OXYCODONE HYDROCHLORIDE AND ACETAMINOPHEN 500 MG: 500 TABLET ORAL at 08:28

## 2020-11-24 RX ADMIN — SODIUM CHLORIDE: 9 INJECTION, SOLUTION INTRAVENOUS at 23:52

## 2020-11-24 RX ADMIN — Medication 1000 UNITS: at 08:28

## 2020-11-24 ASSESSMENT — PAIN DESCRIPTION - ORIENTATION: ORIENTATION: MID

## 2020-11-24 ASSESSMENT — PAIN DESCRIPTION - PAIN TYPE: TYPE: ACUTE PAIN

## 2020-11-24 ASSESSMENT — PAIN DESCRIPTION - DESCRIPTORS: DESCRIPTORS: ACHING

## 2020-11-24 ASSESSMENT — PAIN DESCRIPTION - LOCATION: LOCATION: BACK

## 2020-11-24 ASSESSMENT — PAIN SCALES - GENERAL
PAINLEVEL_OUTOF10: 5
PAINLEVEL_OUTOF10: 0
PAINLEVEL_OUTOF10: 0

## 2020-11-24 ASSESSMENT — PAIN DESCRIPTION - DIRECTION: RADIATING_TOWARDS: BETWEEN SHOULDER BLADES

## 2020-11-24 NOTE — CONSULTS
[address] [place] [address]                                  CONSULTATION    PATIENT NAME: Sandra Theodore                  :        1965  MED REC NO:   [id number]                           ROOM:       [id number]  ACCOUNT NO:   [de-identified]                           ADMIT DATE: 2020  PROVIDER:     Indu Good M.D.    [facility] [place]:  2020    REASON FOR CONSULTATION:  Syncope in a 78-year-old gentleman admitted  with dizziness and history of syncope and admitted for also COVID-19  infection. HISTORY OF PRESENT ILLNESS:  This is actually a 78-year-old   gentleman who has significant past medical history with leukemia, status  post a stem-cell transplant done in 2019, was presented to the  emergency room with episodes of near syncope, generalized body weakness  and fatigue, basically on 2020. The patient has been having  intermittent episodes of nausea, vomiting and diarrhea over the last  five days and the patient has a history of intermittent nausea and  vomiting and basically, starting from last Monday, he has been not  feeling well because of nausea, vomiting, diarrhea. It is almost now  one week and presented to the emergency room last Friday because of a  syncopal episode with fall, resulting in head laceration and that has  been sutured and at that time was diagnosed with COVID-19 and the  patient was stable, chest x-ray was clear and was sent home, and  yesterday, he returned back to the emergency room because of generalized  body weakness, fatigue and recurrent episodes of near syncope when he  gets up and moves and when he is getting ready to go to the restroom,  feels dizzy and about to pass out, but did not pass out, did not lose  consciousness at this time. So basically, in the last two days, the  nausea and vomiting have subsided and getting a little better.   Denied  any fever or chest JVD.  CHEST:  Bilateral decreased air entry. CARDIOVASCULAR:  Arteries are felt; carotid, femoral, pedal.  No bruits. S1, S2 well heard. No murmur or gallop rhythm. ABDOMEN:  Soft, nontender, and nondistended. Bowel sounds are positive. GENITALIA:  No CVA, flank or suprapubic tenderness. LOCOMOTOR:  No cyanosis, clubbing, muscle or joint tenderness. SKIN:  No rashes, ulcers, or nodules. CNS:  Alert, awake, and oriented to time, person, and place. Cranial  nerves are intact. Sensation is intact to light touch and pain. Motor:  Normal muscle strength and tone. Appropriate mood and affect. WORKUP:  Revealed the following:  EKG, sinus rhythm with no acute EKG  abnormality noted. Blood chemistry:  Sodium 135; potassium 5.1; BUN 24,  creatinine 1.4 today, yesterday on admission, BUN 26, creatinine 1.8 and  on the day of syncope on 11/20/2020 BUN 32, creatinine 1.9, suggesting  that the patient's baseline BUN and creatinine was 20 by creatinine of  1.2. So, certainly there is acute-on-chronic kidney injury, suggesting  underlying dehydration because of nausea, vomiting and diarrhea. Hematology:  White blood cells 1.8, hemoglobin 7, platelets 95;  pancytopenic. Chest x-ray, segmental atelectasis versus infiltrates on the right lower  lobe. Otherwise, no other workup. No echocardiogram yet. ASSESSMENT:  1.  COVID-19 pneumonia. He is on Decadron, vitamin C, vitamin D and  zinc.  So, possibly superimposed bacterial pneumonia. 2.  Hypoxic respiratory failure, on oxygen. 3.  Acute kidney injury on chronic kidney disease. 4.  Dehydration secondary to gastroenteritis. 5.  Gastroenteritis with nausea, vomiting and diarrhea, over the last  one week has this intermittently for a long time. 6.  Syncopal episode secondary to dehydration with acute kidney injury  and nausea, vomiting and diarrhea. 7.  History of leukemia, status post stem-cell transplant in 11/2019.  8.  Pancytopenia.   9.  History of hypertension. 10.  History of CMV infection. PLAN AND RECOMMENDATIONS:  At this level, the syncopal episode seems to  be related to dehydration, hypertension and probable orthostatic  hypotension at that time, but no orthostatic hypotension today. This  was done after hydration. So basically, the patient advised to get well  hydrated if he starts to have nausea and vomiting down the line, but now  he is under treatment for COVID pneumonia and superimposed bacterial  pneumonia. Keep hydration and Nephrology is on board and BUN and  creatinine already coming close to baseline. Acute kidney injury is  improving. Recommend at this level, echocardiogram to be done as  outpatient and two weeks' event monitor to be done on discharge, and  moreover, the patient may benefit from noninvasive ischemia workup  despite negative troponin as outpatient. That all to be done as outpatient once he is off the COVID quarantine  phase. I discussed with the patient the plan of care. Based on the course, we  will gauge further care. Thank you for allowing us to participate in the care of this patient. I  will follow up with you. Lashonda Huber.  Loco Hernandez M.D.    D: 11/23/2020 18:10:58       T: 11/23/2020 19:45:22     [email or web address]  Job#: [id number]     Doc#: [id number]    CC:

## 2020-11-24 NOTE — CONSULTS
CONSULTATION NOTE :ID       Patient - Jeanie Rodríguez,  Age - 60 y.o.    - 1965      Room Number - 3B-34/034-A   N -  [id number]   [facility]  Date of Admission -  2020  2:26 PM  Patient's PCP: Sharon Hammond MD     Requesting Physician: Wen De Anda MD    REASON FOR CONSULTATION   Hx of CMV: need for treatment  CHIEF COMPLAINT   Syncopal episode. HISTORY OF PRESENT ILLNESS       This is a very pleasant 60 y.o. male who was admitted to the hospital with a chief complaints of syncopal episode with a fall. He sustained a laceration on his forehead requiring suture at the emergency room. He was diagnosed with COVID-19 infection and was admitted to the Covid unit. I was asked to see this patient because of  history of CMV. He had history of AML and a year ago he had bone marrow transplant at [facility]. He was treated for CMV and has been on treatment for mild case of szggh-jvnbmr-bywn disease. Is on suppressive treatment. Last flareup was over 4 months ago. Has been having nausea vomiting poor appetite and diarrhea. Was concerned that this may be related to CMV. He called [facility] prior to his admission and lab has been sent. he is on CPAP. Receiving steroid his medical record was reviewed.   Denies any fever or chills no chest pain    PAST MEDICAL  HISTORY       Past Medical History:   Diagnosis Date    Depression     Erectile dysfunction     Fatigue     Hyperlipidemia     Hypertension     Leukemia ([facility] [address]     Seasonal allergies     Xeroderma        PAST SURGICAL HISTORY     Past Surgical History:   Procedure Laterality Date    COLONOSCOPY  2016    Excision of Anal Skin Tag - Dr. Kailey Rocha  2006    bilateral feet    HERNIA REPAIR Right 2004    excision right groin skin tag, escision of penile lesion--Dr. Messer Amanda KNEE SURGERY  2010    right knee    KNEE SURGERY      2 times    NECK SURGERY      fusion    [place] SPINE SURGERY PROCEDURE UNLISTED N/A 6/15/2018    L4-5 DECOMPRESSION, L4-5 POSTERIOR FUSION performed by Shaq Hogue MD at [address]  12/08    cervical fusion   [facility] TONSILLECTOMY      TOOTH EXTRACTION Left 5-1-15    [id number]    VASECTOMY  12/07    Dr. Domingo Taveras:       Scheduled Meds:   sodium bicarbonate  650 mg Oral BID    dexamethasone  6 mg Oral Daily    Vitamin D  1,000 Units Oral Daily    vitamin C  500 mg Oral Daily    zinc sulfate  50 mg Oral Daily    fluticasone  1 spray Each Nostril Daily    acyclovir  800 mg Oral BID    allopurinol  300 mg Oral Daily    apixaban  5 mg Oral BID    carvedilol  25 mg Oral BID    cloNIDine  0.1 mg Oral Daily    dapsone  100 mg Oral Daily    fluconazole  400 mg Oral Daily    pantoprazole  40 mg Oral QAM AC    ruxolitinib phosphate  10 mg Oral BID     Continuous Infusions:   sodium chloride 100 mL/hr at 11/24/20 1446     PRN Meds:ondansetron, acetaminophen **OR** acetaminophen, guaiFENesin-dextromethorphan  Allergies:   ALLERGIES:    Amlodipine        SOCIAL HISTORY:     TOBACCO:   reports that he quit smoking about 24 years ago. His smoking use included cigarettes. He has a 12.50 pack-year smoking history. He has never used smokeless tobacco.     ETOH:   reports previous alcohol use. Patient currently lives with family       FAMILY HISTORY:         Problem Relation Age of Onset    Heart Disease Mother     Diabetes Mother     Heart Failure Mother     Cancer Father         Prostate/Bladder    Diabetes Brother     Heart Disease Brother     Cancer Brother         Prostate    Cancer Maternal Grandmother     Heart Disease Maternal Grandmother     Heart Disease Paternal Grandmother     Diabetes Sister        REVIEW OF SYSTEMS:     Constitutional: no fever, no night sweats, + fatigue, no weight loss.   Head: no head ache , scalp laceration required suture at the emergency room, no migranes. Eye:   dry eyes ears: no hearing difficulty, no tinnitus  Mouth/throat: no ulceration, dental caries , dysphagia, no hoarseness and voice change  Respiratory: no cough no chest pain,no shortness of breath,no wheezing  CVS: no palpitation, no chest pain,   GI: no abdominal pain, no nausea , no vomiting, no constipation,+ diarrhea. TASHA: no dysuria, frequency and urgency, no hematuria, no kidney stones  Musculoskeletal: no joint pain, swelling , stiffness,  Endocrine: no polyuria, polydipsia, no cold or heat intolerance  Hematology: no anemia, no easy brusing or bleeding, no hx of clotting disorder  Dermatology: no skin rash, no skin lesions, no pruritis,  Neurological:no headaches,no dizziness, no seizure, no numbness. Psychiatry: no depression, no anxiety,no panic attacks, no suicide ideation    PHYSICAL EXAM:     BP (!) 137/90   Pulse 55   Temp 98.5 °F (36.9 °C) (Axillary)   Resp 20   Ht 5' 10\" (1.778 m)   Wt 194 lb 14.2 oz (88.4 kg)   SpO2 98%   BMI 27.96 kg/m²   General apperance:  Awake, alert,on CPAP. HEENT: pale conjunctiva, unicteric sclera, moist oral mucosa. dressed scalp wound  Chest:  On CPAP, bilateral air entry  Cardiovascular:  RRR ,S1S2, no murmur or gallop. Abdomen:  Soft, non tender to palpation. Extremities: no edema  Skin:  Warm and dry. CNS:oriented to person place and time.         LABS:     CBC:   Recent Labs     11/22/20  1500 11/23/20  1009 11/23/20  2145 11/24/20  0846   WBC 2.6* 1.8*  --  3.3*   HGB 7.5* 7.0* 7.8* 8.5*   * 95*  --  108*     BMP:    Recent Labs     11/22/20  1623 11/23/20  1009 11/24/20  0402   * 135 135   K 4.2 5.1 4.4    102 103   CO2 21* 20* 19*   BUN 25* 27* 32*   CREATININE 1.6* 1.4* 1.5*   GLUCOSE 111* 139* 139*     Calcium:  Recent Labs     11/24/20  0402   CALCIUM 8.3*    Glucose:  Recent Labs     11/23/20  0837 11/23/20  1234 11/23/20  1649   POCGLU 139* 149* 173*     HgbA1C: No results for input(s): LABA1C in the last 72 hours. INR:   Recent Labs     11/22/20  1500   INR 1.57*     Hepatic:   Recent Labs     11/24/20  0402   ALKPHOS 97   ALT 25   AST 48*   PROT 5.2*   BILITOT 0.4   LABALBU 3.3*     Amylase and Lipase:  Recent Labs     11/22/20  1623   LACTA 0.9     Lactic Acid:   Recent Labs     11/22/20  1623   LACTA 0.9     Micro:   Lab Results   Component Value Date    BC No growth-preliminary  No growth   06/18/2019       Problem list of patient      Patient Active Problem List   Diagnosis Code    Depression F32.9    Hypertension I10    Hyperlipidemia E78.5    Fatigue R53.83    Xeroderma Q80.9    Erectile dysfunction N52.9    Degenerative lumbar spinal stenosis M48.061    Acute leukemia in remission (HCC) C95.01    Obesity (BMI 30.0-34. 9) E66.9    Insomnia G47.00    Paroxysmal atrial fibrillation (HCC) I48.0    Near syncope R55    COVID-19 U07.1    Acute kidney injury superimposed on CKD (HCC) N17.9, N18.9    Dehydration E86.0           Impression and Recommendation:   COVID-19 infection. Syncopal episode with scalp laceration  Hx of AML s/p Bone marrow transplant. Hx of CMV: will follow result of CMV viral load  Continue prophylaxis treatment   Thank you Fabiana Marks MD for allowing me to participate in this patient's care.     Angelica Bassett MD,FACP 11/24/2020 4:43 PM

## 2020-11-24 NOTE — PROGRESS NOTES
Palliative care eval received to discuss code status. Case discussed with Dr Dario Moon. Writer in to see pt. Pt awake and alert, on CPAP. Pt is oriented to all spheres. Palliative care introduced, code status options reviewed. We also discussed intubation for resp failure and what is entailed with that. Questions answered and pt states understanding. Pt requests to continue with Full code status. Much emotional support given. Pt's nurse,ANAYA Figueroa and Dr Dario Moon updated on above. Palliative care will remain available, floor to notify PRN for further needs.

## 2020-11-24 NOTE — PROGRESS NOTES
Patient resting in bed. Right and left nare swabbed with Ethyl Alcohol 62%. Patient back on CPAP. No needs voiced at this time.

## 2020-11-24 NOTE — CONSULTS
NEUROLOGY CONSULT NOTE      Requesting Physician: Cailin Mccollum MD    Reason for Consult:  Evaluate for syncope from dehydration    History of Present Illness:  Jennifer Carl is a 60 y.o. male admitted to [address] [place] [address] on 11/22/2020.      60year old man with leukemia on steroid, he came into the hospital because he didn't feel well and vomited, he c/o sudden lightheaded and passed out transient and hit his head. Patient stated that he was very dehydrated prior to that and has not been drinking well, same thing happened 4 months ago. Pt was found to be covid+    Reports no chest pain. No shortness of breath with exertion. Reports no neck pain. No vision changes. No dysphagia. No fever. No rash. No weight loss. Past Medical History:        Diagnosis Date    Depression     Erectile dysfunction     Fatigue     Hyperlipidemia     Hypertension     Leukemia ([address]     Seasonal allergies     Xeroderma            Procedure Laterality Date    COLONOSCOPY  09/09/2016    Excision of Anal Skin Tag - Dr. Miguel Meléndez  2006    bilateral feet    HERNIA REPAIR Right 2004    excision right groin skin tag, escision of penile lesion--Dr. Damon Awan KNEE SURGERY  2010    right knee    KNEE SURGERY      2 times    NECK SURGERY      fusion    [place] SPINE SURGERY PROCEDURE UNLISTED N/A 6/15/2018    L4-5 DECOMPRESSION, L4-5 POSTERIOR FUSION performed by Manuel Betancur MD at [address]   12/08    cervical fusion   [address] EXTRACTION Left 5-1-15    [id number]    VASECTOMY  12/07    Dr. Lance Paris       Allergies:     Allergies   Allergen Reactions    Amlodipine Swelling        Current Medications:   0.9 % sodium chloride bolus, Once  ondansetron (ZOFRAN) injection 4 mg, Q6H PRN  acetaminophen (TYLENOL) tablet 650 mg, Q6H PRN    Or  acetaminophen (TYLENOL) suppository 650 mg, Q6H PRN  guaiFENesin-dextromethorphan (ROBITUSSIN DM) 100-10 MG/5ML syrup 5 mL, Q4H PRN  dexamethasone (DECADRON) tablet 6 mg, Daily  Vitamin D (CHOLECALCIFEROL) tablet 1,000 Units, Daily  vitamin C (ASCORBIC ACID) tablet 500 mg, Daily  zinc sulfate (ZINCATE) capsule 50 mg, Daily  fluticasone (FLONASE) 50 MCG/ACT nasal spray 1 spray, Daily  acyclovir (ZOVIRAX) tablet 800 mg, BID  allopurinol (ZYLOPRIM) tablet 300 mg, Daily  apixaban (ELIQUIS) tablet 5 mg, BID  carvedilol (COREG) tablet 25 mg, BID  cloNIDine (CATAPRES) tablet 0.1 mg, Daily  dapsone tablet 100 mg, Daily  fluconazole (DIFLUCAN) tablet 400 mg, Daily  pantoprazole (PROTONIX) tablet 40 mg, QAM AC  ruxolitinib phosphate (JAKAFI) 10 MG tablet TABS 10 mg, BID  0.9 % sodium chloride infusion, Continuous         Social History:  Social History     Tobacco Use   Smoking Status Former Smoker    Packs/day: 0.50    Years: 25.00    Pack years: 12.50    Types: Cigarettes    Last attempt to quit: 1996    Years since quittin.9   Smokeless Tobacco Never Used     Social History     Substance and Sexual Activity   Alcohol Use Not Currently    Alcohol/week: 0.0 standard drinks    Comment: Rarely     Social History     Substance and Sexual Activity   Drug Use Yes    Types: Marijuana    Comment: sleep aid/pain relief         Family History:       Problem Relation Age of Onset    Heart Disease Mother     Diabetes Mother     Heart Failure Mother     Cancer Father         Prostate/Bladder    Diabetes Brother     Heart Disease Brother     Cancer Brother         Prostate    Cancer Maternal Grandmother     Heart Disease Maternal Grandmother     Heart Disease Paternal Grandmother     Diabetes Sister        Review of Systems:  All systems reviewed and are all negative except what is mentioned in history of present illness. I reviewed the patient PMH,medications, family history, social history.     Physical Exam:  /80   Pulse 62   Temp 98 °F (36.7 °C) (Oral)   Resp 20   Ht 5' 10\" (1.778 m)   Wt 202 lb 6.4 oz (91.8 kg)   SpO2 94%   BMI 29.04 kg/m²  I Body mass index is 29.04 kg/m². I   Wt Readings from Last 1 Encounters:   11/23/20 202 lb 6.4 oz (91.8 kg)           General appearance - alert, in no distress, oriented to person, place, and time and weight  Mental status -Level of Alertness: awake  Orientation: person, place, time  Memory: normal  Fund of Knowledge: normal  Attention/Concentration: normal  Language: normal. Mood is normal  Neck - supple, no neck adenopathy, carotids upstroke normal bilaterally, no carotid bruits. There is no LAP in the neck. There is no thyroid enlargement. Neurological -   Cranial [id number]:   Cranial nerve II: Normal. There is full visual fields  Cranial nerve III: Pupils: equal, round, reactive to light   Cranial nerves III, IV, VI: Extraocular Movements: intact   Cranial nerve V: Facial sensation: intact   Cranial nerve VII:Facial strength: intact   Cranial nerve VIII: Hearing: intact   Cranial nerve IX: Palate Elevation intact bilaterally  Cranial nerve XI: Shoulder shrug intact bilaterally  Cranial nerve XII: Tongue midline   neck supple without rigidity  Motor exam is 5/5 in the upper and lower extremities . Normal muscle tone. There is no muscle atrophy. There is no muscle fasciculation   Sensory is intact   Coordination: finger to nose intact  Gait and station not tested    Abnormal movement none. Long tracts are intact   Skin - no rashes or lesions, warm and dry to touch  Superficial temporal artery pulses are normal.   Musculoskeletal: Has no hand arthritis, no limitation of ROM in any of the four extremities. no joint tenderness, deformity or swelling. There is no leg edema. The Heart was regular in rate and rhythm. No heart murmur  Chest- Clear, good effort on high flow  Abdomen: soft, intact bowel sounds.         Labs:    CBC:   Recent Labs     11/22/20  1500 11/23/20  1009 11/23/20  2145   WBC 2.6* 1.8*  --    HGB 7.5* 7.0* 7.8*   * 95*  --    MCV 105.9* 107.3*  --    MCH 33.8* 34.1*  --    MCHC 31.9* 31.8*  --      CMP:  Recent Labs     11/22/20  1500 11/22/20  1623 11/23/20  1009   * 132* 135   K 4.1 4.2 5.1   CL 98 100 102   CO2 22* 21* 20*   BUN 26* 25* 27*   CREATININE 1.8* 1.6* 1.4*   LABGLOM 39* 45* 52*   GLUCOSE 121* 111* 139*   CALCIUM 8.2* 7.7* 8.2*     Liver:   Recent Labs     11/23/20  1009   AST 48*   ALT 26   ALKPHOS 94   PROT 5.4*   LABALBU 3.5   BILITOT 0.4     MRI BRAIN:  No results found for this or any previous visit. No results found for this or any previous visit. No results found for this or any previous visit. No results found for this or any previous visit. No results found for this or any previous visit. No results found for this or any previous visit. Results for orders placed during the hospital encounter of 12/02/16   MRI Cervical Spine W WO Contrast    Narrative PROCEDURE: MRI CERVICAL SPINE W WO CONTRAST    CLINICAL INFORMATION: Cervical neck pain with evidence of disc disease, S/P cervical spinal fusion, Cervical radicular pain . Neck pain. Headache. Stiffness. Cervical fusion 2008. COMPARISON: Cervical spine x-rays 11/17/2016. TECHNIQUE: Sagittal T1, T2 and STIR sequences were obtained through the cervical spine. Axial fast and echo and gradient echo T2-weighted images were obtained. Postcontrast axial and sagittal T1-weighted images were obtained. FINDINGS:    The patient has a solid fusion across the disc at the C5-6 level. There is hardware anteriorly at the C5-C7 levels. There is straightening of the normal cervical lordosis. There are degenerative marrow changes. No suspicious marrow signal abnormality is present. There is an anterior osteophyte at the C4-5 level. There is no bone marrow edema. No suspicious osseous   lesions are present. The facet joints are normally aligned. The cervical spinal cord is of normal caliber and signal intensity.  There is no abnormal enhancement in the cervical orders placed during the hospital encounter of 11/20/20   CT HEAD WO CONTRAST    Narrative PROCEDURE: CT HEAD WO CONTRAST    CLINICAL INFORMATION: syncope; head injury; Eliquis. COMPARISON: February 25, 2010. TECHNIQUE: Noncontrast 5 mm axial images were obtained through the brain. All CT scans at this facility use dose modulation, iterative reconstruction, and/or weight-based dosing when appropriate to reduce radiation dose to as low as reasonably achievable. FINDINGS:    No acute intracranial findings. Gray-white differentiation is preserved. No acute hemorrhage or midline shift. Ventricles are within normal limits for age. Paranasal sinuses are clear. Mastoid air cells are patent. Skull: Unremarkable. Soft tissues: Frontal scalp hematoma. Impression No acute intracranial findings. Frontal scalp hematoma. **This report has been created using voice recognition software. It may contain minor errors which are inherent in voice recognition technology. **    Final report electronically signed by Dr. Jennifer Rubi on 11/20/2020 2:32 PM         We reviewed the patient records and available information in the EHR       Impression:    60 year man with leukemia, found to be COVID+ and dehydration and syncoped    Recommendations:    - there is nothing neurological here  - this is due to dehydration, please keep pt well hydrated  - neurology will sign off.                                            1. Discussed with primary service. 2. Please call if any questions. Time spent was at least 45 min of time evaluating patient, discussion with staff,  planning for treatment and evaluation. The time was spent examining patient, reviewing the images personally, reviewing the chart, perform high complexity decision making and speaking with the nursing staff regarding recommendations.      Electronically signed by Neil Richard MD on 11/23/2020 at 11:25 PM    Eliecer Fox MD  Attending Neurologist/Neurointensivist

## 2020-11-24 NOTE — PROGRESS NOTES
Hospitalist Progress Note    Patient:  Mala Perez      Unit/Bed:3B-34/034-A    YOB: 1965    MRN: [id number]       Acct: [de-identified]     PCP: Blane Calix MD    Date of Admission: 11/22/2020    Chief Complaint: syncope    Hospital Course:     Per H/P note:     \" Star Hernandez a 60 y. o. male who presents to the emergency department for evaluation of presyncope and fatigue.  Patient has a past medical history of leukemia and is followed by Dr. Osbaldo Oliveros in [address] [facility] had a stem cell transplant November 12, 2019. Jamal Muñoz is still immunosuppressed with daily steroids (5mg prednisone).       Prior to presentation on Friday he was feeling bad starting on Monday. On Tuesday Wednesday he started having nausea/vomiting/diarrea. He presented to ED Friday because of Fall. Had a head laceration repaired but required no oxygen and CXR was clear at that time. Since Friday has been feeling pretty bad. Saturday morning, yesterday and today he has had increased nausea, no vomiting since Friday. Hasn't had a fever at home. Has been trying to eat broth but hasn't been able to eat much due to nausea. He has been having diarrhea since Tuesday. Last bowel movement was diarrhea today. No blood in stools.      He was seen in this emergency department on Friday for a syncopal fall resulting in head laceration.  It was at this time that he has positive for Lyric Basil was sent home after sutures and clear chest x-ray.  Today he return to the emergency department because of worsening weakness, fatigue and an episode of presyncope.  Patient states he was getting ready to the restroom when he felt like he was going to pass out feeling dizzy and weak.  He denies any loss of consciousness this time.  These feelings resolved after he sat down.  Over the last 2 days he has had associated nausea, vomiting, diarrhea and a mildly productive cough. When he takes deep breaths that's when the cough is active. Patient denies using voice recognition software. It may contain minor errors which are inherent in voice recognition technology. **      Final report electronically signed by Dr. Smith Kidney on 11/23/2020 11:55 AM      XR CHEST PORTABLE   Final Result   Subsegmental atelectasis versus infiltrate right lower lobe            **This report has been created using voice recognition software. It may contain minor errors which are inherent in voice recognition technology. **      Final report electronically signed by Dr. Samir Costa on 11/22/2020 4:15 PM            Assessment/Plan:      Acute hypoxic respiratory failure secondary to COVID-19 pneumonia, worsening     -ABG today showed acute respiratory alkalosis. Switch high flow oxygen to CPAP. Repeat ABG in 3 hours  -Wean FI02 in increments of 5% every 2 minutes until 40% then wean flow 10 Liters per minute every 2 minutes as tolerated.    -please see below for COVID-19 pneumonia management     COVID-19 pneumonia     -Continue Decadron 6 mg p.o. daily (of note patient takes prednisone 5 mg p.o. daily for history of stem cell transplant)  -Not a candidate for remdesivir, patient is on high flow and due to GREGOR  -Case discussed with patient's oncologist Dr. Elver Duque in [facility], who agreed to give patient convalescent plasma, ordered. Risks and benefits of convalescent plasma discussed with patient.   Patient is agreeable for convalescent plasma as long as his oncologist is okay with this.   -Continue vitamin C, vitamin D and zinc   -already on eliquis for hx left leg DVT   -Procalcitonin not significantly elevated, no indication for antibiotics  -D-dimer normal   -Contact and droplet precaution     Loss of consciousness/syncope, likely due to dehydration/hypoperfusion, rule out other pathology     -Patient reports diarrhea, nausea and vomiting prior having loss of consciousness  -CT head on arrival no acute findings  -bilateral carotid venous ultrasound ordered  -Patient had echocardiogram in July 2020 in [facility] EF of 51%, left ventricular concentric hypertrophy, low normal to mild global hypokinesis with low normal to mild decrease in systolic function  -Limited echocardiogram ordered  -Orthostatic vital signs     GREGOR on CKD stage II     -Creatinine 1.8 on arrival, baseline creatinine 0.9-1.1, now 1.5 from 1.4 yesterday   -Continue IV fluids  -Avoid nephrotoxins  -BMP in a.m.     Diarrhea due to COVID-19 infection     -lactobacillus      Mild hyponatremia, likely due to dehydration due to N/V/D, resolved      -Improved with IV fluids  -BMP in a.m.     Isolated AST elevation     -Repeat LFTs in a.m.     Acute on chronic macrocytic anemia, improving     -Hemoglobin dropped to 7.0 from 7.5 on arrival   --Baseline hemoglobin around 7.2-9.8  -s/p 1 unit PRBC  -CBC in am   -check folate and B12      Pancytopenia, improving      -Chronic pancytopenia likely from recent leukemia history s/p stem cell transplant.   Worsening on 11/23 likely due to COVID-19 infection, now improving  -immunocompromised state  -currently on daily steroid and chemotherapy as above  -daily cbc, continue to monitor  -transfuse for hgb < 7.0     Leukocytosis, likely reactive secondary to steroids     -Afebrile  -CBC in a.m.     Hx of left leg DVT     -cont eliquis     Hypocalcemia      -check ical  -albumin mildly low       Nonanion gap metabolic acidosis     -start sodium bicarbonate oral      Immunocompromised state secondary to leukemia      Leukemia, s/p stem cell transplant 11/12/19     -on 5mg prednisone daily home dose, holding for now patient on decadron 6mg oral  -cont home ruxolitinib phosphate 10     Essential hypertension     -bps on the lower side on admission  -cont clonidine, coreg      Hx of Depression     -restart home clonidine     Hx of CMV infection     -restarted patient's home acyclovir and dapsone   -being worked up by [employer] (CMV test pending)   - pt is very concerned about this, ID consulted for further

## 2020-11-24 NOTE — CARE COORDINATION
DISASTER CHARTING    11/24/20, 2:27 PM EST    DISCHARGE ONGOING EVALUATION:     Milena Huertas day: 2  Location: [address] [place] [address] Reason for admit: COVID-19 [U07.1]  COVID-19 [U07.1]   Barriers to Discharge: Afebrile. CPAP 100%. Sat 98%. /hr. Zovirax. Eliquis. Dexamethasone. Vits. Pt has requested transfer to [facility]. (Pt oncologist there). Transfer initiated per Dr. Dhruv Fitzgerald. May take couple days or more for ICU bed for pt at [facility]. Pt oncologist in [place] gave Dr. Dhruv Fitzgerald approval for plasma if needed. ID consulted concerns of CMV. Palliative Care consulted. PCP: Chloé Pulliam MD  Patient Goals/Plan/Treatment Preferences: Pt requests transfer to [facility]. Awaiting ICU bed.

## 2020-11-25 ENCOUNTER — APPOINTMENT (OUTPATIENT)
Dept: GENERAL RADIOLOGY | Age: 55
DRG: 137 | End: 2020-11-25
Payer: COMMERCIAL

## 2020-11-25 VITALS
SYSTOLIC BLOOD PRESSURE: 129 MMHG | TEMPERATURE: 99.2 F | RESPIRATION RATE: 20 BRPM | HEIGHT: 70 IN | OXYGEN SATURATION: 92 % | DIASTOLIC BLOOD PRESSURE: 86 MMHG | WEIGHT: 194.89 LBS | HEART RATE: 46 BPM | BODY MASS INDEX: 27.9 KG/M2

## 2020-11-25 LAB
ALBUMIN SERPL-MCNC: 3.2 G/DL (ref 3.5–5.1)
ALP BLD-CCNC: 92 U/L (ref 38–126)
ALT SERPL-CCNC: 22 U/L (ref 11–66)
ANION GAP SERPL CALCULATED.3IONS-SCNC: 11 MEQ/L (ref 8–16)
AST SERPL-CCNC: 53 U/L (ref 5–40)
BILIRUB SERPL-MCNC: 0.4 MG/DL (ref 0.3–1.2)
BUN BLDV-MCNC: 35 MG/DL (ref 7–22)
CALCIUM SERPL-MCNC: 8.2 MG/DL (ref 8.5–10.5)
CHLORIDE BLD-SCNC: 102 MEQ/L (ref 98–111)
CO2: 20 MEQ/L (ref 23–33)
CREAT SERPL-MCNC: 1.5 MG/DL (ref 0.4–1.2)
D-DIMER QUANTITATIVE: 607 NG/ML FEU (ref 0–500)
FOLATE: 4.1 NG/ML (ref 4.8–24.2)
GFR SERPL CREATININE-BSD FRML MDRD: 48 ML/MIN/1.73M2
GLUCOSE BLD-MCNC: 142 MG/DL (ref 70–108)
POTASSIUM REFLEX MAGNESIUM: 4.3 MEQ/L (ref 3.5–5.2)
SODIUM BLD-SCNC: 133 MEQ/L (ref 135–145)
TOTAL PROTEIN: 5.1 G/DL (ref 6.1–8)
VITAMIN B-12: 1175 PG/ML (ref 211–911)

## 2020-11-25 PROCEDURE — 6370000000 HC RX 637 (ALT 250 FOR IP): Performed by: FAMILY MEDICINE

## 2020-11-25 PROCEDURE — 36415 COLL VENOUS BLD VENIPUNCTURE: CPT

## 2020-11-25 PROCEDURE — 2580000003 HC RX 258: Performed by: STUDENT IN AN ORGANIZED HEALTH CARE EDUCATION/TRAINING PROGRAM

## 2020-11-25 PROCEDURE — 99239 HOSP IP/OBS DSCHRG MGMT >30: CPT | Performed by: PHYSICIAN ASSISTANT

## 2020-11-25 PROCEDURE — 94660 CPAP INITIATION&MGMT: CPT

## 2020-11-25 PROCEDURE — 85379 FIBRIN DEGRADATION QUANT: CPT

## 2020-11-25 PROCEDURE — 82607 VITAMIN B-12: CPT

## 2020-11-25 PROCEDURE — 82746 ASSAY OF FOLIC ACID SERUM: CPT

## 2020-11-25 PROCEDURE — 71045 X-RAY EXAM CHEST 1 VIEW: CPT

## 2020-11-25 PROCEDURE — 6370000000 HC RX 637 (ALT 250 FOR IP): Performed by: STUDENT IN AN ORGANIZED HEALTH CARE EDUCATION/TRAINING PROGRAM

## 2020-11-25 PROCEDURE — 80053 COMPREHEN METABOLIC PANEL: CPT

## 2020-11-25 PROCEDURE — 6360000002 HC RX W HCPCS: Performed by: STUDENT IN AN ORGANIZED HEALTH CARE EDUCATION/TRAINING PROGRAM

## 2020-11-25 PROCEDURE — 94761 N-INVAS EAR/PLS OXIMETRY MLT: CPT

## 2020-11-25 PROCEDURE — 2700000000 HC OXYGEN THERAPY PER DAY

## 2020-11-25 RX ORDER — SODIUM BICARBONATE 650 MG/1
650 TABLET ORAL 2 TIMES DAILY
Qty: 20 TABLET | Refills: 0
Start: 2020-11-25

## 2020-11-25 RX ORDER — ASCORBIC ACID 500 MG
500 TABLET ORAL DAILY
Qty: 30 TABLET | Refills: 3
Start: 2020-11-26

## 2020-11-25 RX ORDER — DEXAMETHASONE 6 MG/1
6 TABLET ORAL DAILY
Qty: 10 TABLET | Refills: 0
Start: 2020-11-26 | End: 2020-12-06

## 2020-11-25 RX ORDER — GUAIFENESIN/DEXTROMETHORPHAN 100-10MG/5
5 SYRUP ORAL EVERY 4 HOURS PRN
Qty: 120 ML | Refills: 0
Start: 2020-11-25 | End: 2020-12-05

## 2020-11-25 RX ORDER — ZINC SULFATE 50(220)MG
50 CAPSULE ORAL DAILY
Qty: 30 CAPSULE | Refills: 3 | COMMUNITY
Start: 2020-11-26

## 2020-11-25 RX ORDER — FLUTICASONE PROPIONATE 50 MCG
1 SPRAY, SUSPENSION (ML) NASAL DAILY
Qty: 1 BOTTLE | Refills: 3
Start: 2020-11-26

## 2020-11-25 RX ORDER — CHOLECALCIFEROL (VITAMIN D3) 25 MCG
1000 TABLET ORAL DAILY
Qty: 60 TABLET | Refills: 0
Start: 2020-11-26

## 2020-11-25 RX ADMIN — Medication 1000 UNITS: at 10:00

## 2020-11-25 RX ADMIN — Medication 50 MG: at 09:58

## 2020-11-25 RX ADMIN — ALLOPURINOL 300 MG: 300 TABLET ORAL at 17:21

## 2020-11-25 RX ADMIN — DAPSONE 100 MG: 25 TABLET ORAL at 09:59

## 2020-11-25 RX ADMIN — CLONIDINE HYDROCHLORIDE 0.1 MG: 0.1 TABLET ORAL at 17:21

## 2020-11-25 RX ADMIN — SODIUM BICARBONATE 650 MG: 650 TABLET ORAL at 09:58

## 2020-11-25 RX ADMIN — PANTOPRAZOLE SODIUM 40 MG: 40 TABLET, DELAYED RELEASE ORAL at 17:21

## 2020-11-25 RX ADMIN — SODIUM CHLORIDE: 9 INJECTION, SOLUTION INTRAVENOUS at 09:42

## 2020-11-25 RX ADMIN — CARVEDILOL 25 MG: 25 TABLET, FILM COATED ORAL at 09:59

## 2020-11-25 RX ADMIN — ACYCLOVIR 800 MG: 800 TABLET ORAL at 09:59

## 2020-11-25 RX ADMIN — FLUCONAZOLE 400 MG: 200 TABLET ORAL at 09:58

## 2020-11-25 RX ADMIN — Medication 1 CAPSULE: at 09:58

## 2020-11-25 RX ADMIN — APIXABAN 5 MG: 5 TABLET, FILM COATED ORAL at 09:59

## 2020-11-25 RX ADMIN — DEXAMETHASONE 6 MG: 4 TABLET ORAL at 09:58

## 2020-11-25 RX ADMIN — OXYCODONE HYDROCHLORIDE AND ACETAMINOPHEN 500 MG: 500 TABLET ORAL at 09:58

## 2020-11-25 ASSESSMENT — PAIN SCALES - GENERAL: PAINLEVEL_OUTOF10: 0

## 2020-11-25 NOTE — PROGRESS NOTES
Consult for remdesivir  Patient does not qualify d/t HHF o2 requirements and elevated LFT's  Remdesivir Initiation Note    This patient meets criteria for initiation of remdesivir based on the following:  · Proven COVID-19  · Mild to moderate disease (SpO2 ? 94% on RA or requiring supplemental O2)  · Acceptable hepatic function (ALT within 5 times ULN)    Exclusion Criteria:   Severe disease requiring invasive or non-invasive mechanical ventilation (includes HFNC & BiPAP)   Could consider use in patients requiring high flow if early on in the disease course (based on symptom duration)   Use of more than 1 vasopressor prior to remdesivir initiation   Already improving on supportive treatment and/or impending discharge   Patients in whom the clinical team think death is in the immediate short-term where remdesivir is unlikely to change the clinical outcome     Liver function tests will be monitored daily while on remdesivir.

## 2020-11-25 NOTE — PROGRESS NOTES
1702: Call received from the transfer center letting me know they had a bed for the patient. Darci 50 on phone from [facility] states he is going to room 1008. Report given to Donny at this time. 1719: Phone call received from [facility] stating wanted to know how soon patient could be ready. Stated they would be there in 30 minutes to get him. 1725: Updated patient that he had a bed and that transport would be here in less than half an hour to get him. 1807: Reema Parada at [facility] and updated her that the patient was on his way there.

## 2020-11-25 NOTE — CARE COORDINATION
DISASTER CHARTING    11/25/20, 1:52 PM EST    DISCHARGE ONGOING EVALUATION:     [facility] day: 3  Location: [address] [place] [address] Reason for admit: COVID-19 [U07.1]  COVID-19 [U07.1]   Barriers to Discharge: HHF 60L/80%. Sat 97%. Palliative Care saw pt yesterday to review code status and pt plans to remain Full Code status. IVF at 100/hr. Dexamethasone. Vits. ID following for CMV tx. PCP: Marta Godoy MD  Patient Goals/Plan/Treatment Preferences: Pt from home. Requests transfer to [facility]. Initiated transfer on 11-23-20 .

## 2020-11-25 NOTE — PROGRESS NOTES
This medication reconciliation was performed during the COVID-19 pandemic. This medication reconciliation was conducted to the best of my ability, utilizing dispense history, contacting the patient pharmacy to verify discrepancies, and reviewing H&P for pertinent information. Pharmacy Medication History Note      List of current medications patient is taking is complete. Source of information: Called Dr. Radha Caruso' office at [facility] to verify doses. Reviewed dispense history and chart review of recent visits. Changes made to medication list:  Medications removed (include reason, ex. therapy complete or physician discontinued):  · none    Medications added/doses adjusted:  · none    Other notes (ex. Recent course of antibiotics, Coumadin dosing):  · Confirmed with Dr. Radha Caruso office at [facility] that current prednisone dose is 5mg tablets take 1 tablet once daily,   and ruxolitinib (Jakafi) dose is 10mg tablets take 1 table twice daily. · Due to COVID-19 restrictions, unable to ask patient about use of other OTC or herbal medications. Due to COVID-19 restrictions, unable to review allergies with patient.       Electronically signed by Gordy Verdin on 11/25/2020 at 1:49 PM

## 2020-11-26 NOTE — SIGNIFICANT EVENT
[facility] reached out to ICU team regarding information about patient's care while admitted to [facility]. Dr. Mercedes Moralez. Specifically they were asking if patient did receive convalescent plasma due to his bone marrow suppression. Case was discussed with CORINA Hutchison who agreed to call a physician back directly. Electronically signed by Gregorio Kennedy CNP on 11/26/2020 at 2:19 PM

## 2020-11-26 NOTE — DISCHARGE SUMMARY
Hospitalist Discharge Summary        Patient: Lakisha Mirza  YOB: 1965  MRN: [id number]   Acct: [de-identified]    Primary Care Physician: Luma Poe MD    Admit date  11/22/2020    Discharge date:  11/26/2020 2:32 PM    Chief Complaint on presentation :-  Syncope     Discharge Assessment and Plan:-   1. COVID 19: Patient tested positive for COVID-19 on 11/20. Patient is afebrile. Decadron (Day 4) ordered. Continue Vitamin C, Vitamin D, Zinc.  Patient did not receive convalescent plasma due to plasma needing to be radiated. Pt did not receive Remdesvir due to [address]. 2. Acute respiratory failure, hypoxia: Patient's baseline is room air. Patient is currently requiring 60 L & 80% FiO2. Incentive spirometry. Acapella. 3. GREGOR on CKD: Resolving. Creatinine on arrival 1.8. Baseline around 1. IVF. 4. History of leukemia status post stem cell transplant 11/19  5. History of CMV infection  6. Essential hypertension  7. History of DVT, LLE    Patient was transferred to [facility] for continued care. Initial H and P and Hospital course:-  Per H/P note:     \"Roc Dalal is a 60 y. o. male who presents to the emergency department for evaluation of presyncope and fatigue.  Patient has a past medical history of leukemia and is followed by Dr. Emma Andrade in [facility] had a stem cell transplant November 12, 2019. Sekou Campos is still immunosuppressed with daily steroids (5mg prednisone).       Prior to presentation on Friday he was feeling bad starting on Monday. On Tuesday Wednesday he started having nausea/vomiting/diarrea. He presented to ED Friday because of Fall. Had a head laceration repaired but required no oxygen and CXR was clear at that time. Since Friday has been feeling pretty bad. Saturday morning, yesterday and today he has had increased nausea, no vomiting since Friday. Hasn't had a fever at home. Has been trying to eat broth but hasn't been able to eat much due to nausea.  He has been having diarrhea since Tuesday. Last bowel movement was diarrhea today. No blood in stools.      He was seen in this emergency department on Friday for a syncopal fall resulting in head laceration.  It was at this time that he has positive for Manjeet Horta was sent home after sutures and clear chest x-ray. Today he return to the emergency department because of worsening weakness, fatigue and an episode of presyncope.  Patient states he was getting ready to the restroom when he felt like he was going to pass out feeling dizzy and weak.  He denies any loss of consciousness this time.  These feelings resolved after he sat down.  Over the last 2 days he has had associated nausea, vomiting, diarrhea and a mildly productive cough. When he takes deep breaths that's when the cough is active. Patient denies any fever or chest pain. Per ED team, head lac is healing appropriately. \"     Patient was admitted due to COVID-19. Patient was given Decadron. Pt did not receive convalescent plasma due to our blood bank inability to irradiate the plasma. Per nursing, Dr. Staci Plunkett spoke with oncologist at [address] [place] [address] and they said that was the safest way to give the plasma. Pt was transferred to [address] [place] [address] due to continuity of care on 11/25.     11/26 1416: This provider spoke with [address] [place] [address] provider about the status of convalescent plasma. This provider stated that to the best of her knowledge, the patient did not receive convalescent plasma due to the plasma needed to be radiated. Physical Exam:-  Vitals:   Patient Vitals for the past 24 hrs:   BP Temp Temp src Pulse Resp SpO2   11/25/20 1732 -- -- -- -- -- 92 %   11/25/20 1602 129/86 99.2 °F (37.3 °C) Oral (!) 46 20 97 %     Weight:   Weight: 194 lb 14.2 oz (88.4 kg)   24 hour intake/output:   No intake or output data in the 24 hours ending 11/26/20 1432    1. General appearance: Acutely ill appearing white male   2. HEENT: Normal cephalic, atraumatic without obvious deformity.  Pupils equal, round, and reactive to light. Extra ocular muscles intact. Conjunctivae/corneas clear. 3. Neck: Supple, with full range of motion. No jugular venous distention. Trachea midline. 4. Respiratory:  Normal respiratory effort on HiFNC. Breath sounds diminished. 5. Cardiovascular: Regular rate and rhythm with normal S1/S2 without murmurs, rubs or gallops. 6. Abdomen: Soft, non-tender, non-distended with normal bowel sounds. 7. Musculoskeletal:  No clubbing, cyanosis or edema bilaterally. 8. Skin: Skin color, texture, turgor normal.  No rashes or lesions. 9. Neurologic:  Neurovascularly intact without any focal sensory/motor deficits. Cranial nerves: II-XII intact, grossly non-focal.  10. Psychiatric: Alert and oriented, thought content appropriate, normal insight  11. Capillary Refill: Brisk,< 3 seconds   12.  Peripheral Pulses: +2 palpable, equal bilaterally       Discharge Medications:-      Medication List      START taking these medications    ascorbic acid 500 MG tablet  Commonly known as:  VITAMIN C  Take 1 tablet by mouth daily     dexamethasone 6 MG tablet  Commonly known as:  DECADRON  Take 1 tablet by mouth daily for 10 days     fluticasone 50 MCG/ACT nasal spray  Commonly known as:  FLONASE  1 spray by Each Nostril route daily     guaiFENesin-dextromethorphan 100-10 MG/5ML syrup  Commonly known as:  ROBITUSSIN DM  Take 5 mLs by mouth every 4 hours as needed for Cough     sodium bicarbonate 650 MG tablet  Take 1 tablet by mouth 2 times daily     Vitamin D3 25 MCG Tabs  Take 1 tablet by mouth daily     zinc sulfate 220 (50 Zn) MG capsule  Commonly known as:  ZINCATE  Take 1 capsule by mouth daily        CONTINUE taking these medications    acyclovir 800 MG tablet  Commonly known as:  ZOVIRAX     allopurinol 300 MG tablet  Commonly known as:  ZYLOPRIM     carvedilol 25 MG tablet  Commonly known as:  COREG     cloNIDine 0.1 MG tablet  Commonly known as:  CATAPRES     dapsone 100 MG tablet     ELIQUIS PO fluconazole 200 MG tablet  Commonly known as:  DIFLUCAN     omeprazole 40 MG delayed release capsule  Commonly known as:  PRILOSEC  take 1 capsule by mouth once daily if needed FOR STOMACH ACID.      ondansetron 4 MG tablet  Commonly known as:  ZOFRAN  Take 1 tablet by mouth 3 times daily as needed for Nausea or Vomiting     predniSONE 5 MG tablet  Commonly known as:  DELTASONE     ruxolitinib phosphate 10 MG tablet  Commonly known as:  JAKAFI           Where to Get Your Medications      You can get these medications from any pharmacy    You don't need a prescription for these medications  · zinc sulfate 220 (50 Zn) MG capsule     Information about where to get these medications is not yet available    Ask your nurse or doctor about these medications  · ascorbic acid 500 MG tablet  · dexamethasone 6 MG tablet  · fluticasone 50 MCG/ACT nasal spray  · guaiFENesin-dextromethorphan 100-10 MG/5ML syrup  · sodium bicarbonate 650 MG tablet  · Vitamin D3 25 MCG Tabs          Labs :-  Recent Results (from the past 72 hour(s))   POCT Glucose    Collection Time: 11/23/20  4:49 PM   Result Value Ref Range    POC Glucose 173 (H) 70 - 108 mg/dl   Hemoglobin and hematocrit, blood    Collection Time: 11/23/20  9:45 PM   Result Value Ref Range    Hemoglobin 7.8 (L) 14.0 - 18.0 gm/dl    Hematocrit 24.7 (L) 42.0 - 52.0 %   Comprehensive Metabolic Panel w/ Reflex to MG    Collection Time: 11/24/20  4:02 AM   Result Value Ref Range    Glucose 139 (H) 70 - 108 mg/dL    CREATININE 1.5 (H) 0.4 - 1.2 mg/dL    BUN 32 (H) 7 - 22 mg/dL    Sodium 135 135 - 145 meq/L    Potassium reflex Magnesium 4.4 3.5 - 5.2 meq/L    Chloride 103 98 - 111 meq/L    CO2 19 (L) 23 - 33 meq/L    Calcium 8.3 (L) 8.5 - 10.5 mg/dL    AST 48 (H) 5 - 40 U/L    Alkaline Phosphatase 97 38 - 126 U/L    Total Protein 5.2 (L) 6.1 - 8.0 g/dL    Alb 3.3 (L) 3.5 - 5.1 g/dL    Total Bilirubin 0.4 0.3 - 1.2 mg/dL    ALT 25 11 - 66 U/L   D-Dimer, Quantitative    Collection Time: 11/24/20  4:02 AM   Result Value Ref Range    D-Dimer, Quant 407.00 0.00 - 500.00 ng/ml FEU   Anion Gap    Collection Time: 11/24/20  4:02 AM   Result Value Ref Range    Anion Gap 13.0 8.0 - 16.0 meq/L   Glomerular Filtration Rate, Estimated    Collection Time: 11/24/20  4:02 AM   Result Value Ref Range    Est, Glom Filt Rate 48 (A) ml/min/1.73m2   CBC auto differential    Collection Time: 11/24/20  8:46 AM   Result Value Ref Range    WBC 3.3 (L) 4.8 - 10.8 thou/mm3    RBC 2.60 (L) 4.70 - 6.10 mill/mm3    Hemoglobin 8.5 (L) 14.0 - 18.0 gm/dl    Hematocrit 26.7 (L) 42.0 - 52.0 %    .7 (H) 80.0 - 94.0 fL    MCH 32.7 26.0 - 33.0 pg    MCHC 31.8 (L) 32.2 - 35.5 gm/dl    RDW-CV 18.2 (H) 11.5 - 14.5 %    RDW-SD 68.3 (H) 35.0 - 45.0 fL    Platelets 566 (L) 607 - 400 thou/mm3    MPV 10.2 9.4 - 12.4 fL    Seg Neutrophils 75.1 %    Lymphocytes 12.0 %    Monocytes 5.5 %    Eosinophils 0.0 %    Basophils 0.0 %    Immature Granulocytes 7.4 %    Segs Absolute 2.5 1.8 - 7.7 thou/mm3    Lymphocytes Absolute 0.4 (L) 1.0 - 4.8 thou/mm3    Monocytes Absolute 0.2 (L) 0.4 - 1.3 thou/mm3    Eosinophils Absolute 0.0 0.0 - 0.4 thou/mm3    Basophils Absolute 0.0 0.0 - 0.1 thou/mm3    Immature Grans (Abs) 0.24 (H) 0.00 - 0.07 thou/mm3    nRBC 0 /100 wbc    Anisocytosis PRESENT Absent   Procalcitonin    Collection Time: 11/24/20  8:46 AM   Result Value Ref Range    Procalcitonin 0.18 (H) 0.01 - 0.09 ng/mL   Blood Gas, Arterial    Collection Time: 11/24/20  9:16 AM   Result Value Ref Range    pH, Blood Gas 7.46 (H) 7.35 - 7.45    PCO2 29 (L) 35 - 45 mmhg    PO2 52 (L) 71 - 104 mmhg    HCO3 20 (L) 23 - 28 mmol/l    Base Excess (Calculated) -3.1 (L) -2.5 - 2.5 mmol/l    O2 Sat 88 %    IFIO2 80     DEVICE Venti Mask     Source: R Brach     COLLECTED BY: [id number]    Blood Gas, Arterial    Collection Time: 11/24/20  1:20 PM   Result Value Ref Range    pH, Blood Gas 7.52 (H) 7.35 - 7.45    PCO2 25 (L) 35 - 45 mmhg    PO2 94 71 - 104 mmhg HCO3 20 (L) 23 - 28 mmol/l    Base Excess (Calculated) -2.5 -2.5 - 2.5 mmol/l    O2 Sat 98 %    IFIO2 100     DEVICE CPAP     Mode NIV     SET PEEP 10.0 mmhg    Wilfred Test Positive     Source: L Radial     COLLECTED BY: [id number]    Comprehensive Metabolic Panel w/ Reflex to MG    Collection Time: 11/25/20  5:34 AM   Result Value Ref Range    Glucose 142 (H) 70 - 108 mg/dL    CREATININE 1.5 (H) 0.4 - 1.2 mg/dL    BUN 35 (H) 7 - 22 mg/dL    Sodium 133 (L) 135 - 145 meq/L    Potassium reflex Magnesium 4.3 3.5 - 5.2 meq/L    Chloride 102 98 - 111 meq/L    CO2 20 (L) 23 - 33 meq/L    Calcium 8.2 (L) 8.5 - 10.5 mg/dL    AST 53 (H) 5 - 40 U/L    Alkaline Phosphatase 92 38 - 126 U/L    Total Protein 5.1 (L) 6.1 - 8.0 g/dL    Alb 3.2 (L) 3.5 - 5.1 g/dL    Total Bilirubin 0.4 0.3 - 1.2 mg/dL    ALT 22 11 - 66 U/L   D-Dimer, Quantitative    Collection Time: 11/25/20  5:34 AM   Result Value Ref Range    D-Dimer, Quant 607.00 (H) 0.00 - 500.00 ng/ml FEU   Vitamin B12 & folate    Collection Time: 11/25/20  5:34 AM   Result Value Ref Range    Vitamin B-12 1175 (H) 211 - 911 pg/mL    Folate 4.1 (L) 4.8 - 24.2 ng/mL   Anion Gap    Collection Time: 11/25/20  5:34 AM   Result Value Ref Range    Anion Gap 11.0 8.0 - 16.0 meq/L   Glomerular Filtration Rate, Estimated    Collection Time: 11/25/20  5:34 AM   Result Value Ref Range    Est, Glom Filt Rate 48 (A) ml/min/1.73m2      Radiology:-  Xr Chest Portable    Result Date: 11/23/2020  PROCEDURE: XR CHEST PORTABLE CLINICAL INFORMATION: f/u imaging. COMPARISON: November 22, 2020 TECHNIQUE: Portable chest. FINDINGS: A few patchy opacities in the left mid and right lung base which could reflect mild developing infiltrate. Costophrenic angles are preserved. Mild cardiomegaly. Ectatic thoracic aorta. No acute osseous findings. Surgical hardware in the lower cervical spine. Minimal patchy opacities in the left mid and right lung base which could reflect mild developing infiltrate.  **This report has been created using voice recognition software. It may contain minor errors which are inherent in voice recognition technology. ** Final report electronically signed by Dr. Kavon Calix on 11/23/2020 7:39 PM    Xr Chest Portable    Result Date: 11/22/2020  PROCEDURE: XR CHEST PORTABLE CLINICAL INFORMATION: COVID . COMPARISON: 11/20/2020 TECHNIQUE: Portable upright FINDINGS: Exam is limited by portable technique. Heart size enlarged. Mediastinum is not widened. Suggestion of patchy infiltrates right lower lobe. Pulmonary vessels are not congested. No effusions. EKG leads overlie the chest. Postsurgical changes cervical spine. Subsegmental atelectasis versus infiltrate right lower lobe **This report has been created using voice recognition software. It may contain minor errors which are inherent in voice recognition technology. ** Final report electronically signed by Dr. Anu Gibbons on 11/22/2020 4:15 PM    Vl Dup Carotid Bilateral    Result Date: 11/23/2020  PROCEDURE: VL DUP CAROTID BILATERAL CLINICAL INFORMATION: syncope COMPARISON: 1/19/2018 TECHNIQUE: Multiple grayscale and color flow images of both carotid systems and both vertebral arteries were obtained. Spectral Doppler waveforms were generated and velocity measurements were obtained. RIGHT PSV/EDV DIST CCA-------->67/17cm/s PROX ICA-------->95/22cm/s ECA---------------->132/0cm/s VERT-------------->32/19cm/s LEFT PSV/EDV DIST CCA-------->88/28cm/s PROX ICA-------->110/33cm/s ECA---------------->83/10cm/s VERT-------------->48/21cm/s     1. RIGHT ICA . Blanchie Bevels Blanchie Bevels Moderate irregular calcific plaque, less than 50% stenosis. Cannot exclude plaque ulceration. .. 2. RIGHT ECA. . Minimal soft plaque, no appreciable stenosis. ..... Blanchie Bevels RIGHT CCA. Blanchie Bevels Unremarkable . Blanchie Bevels 3. RIGHT VERTEBRAL. Blanchie Bevels Severely dampened antegrade flow with tardus parvus configuration, suggesting moderate to high-grade stenosis proximally. ...... LEFT VERTEBRAL. .. Antegrade flow. .. 4. LEFT ICA. .... Moderate irregular calcific plaque, less than 50% stenosis. Cannot exclude plaque ulceration. .. 5. LEFT ECA. .. Minimal soft plaque, no appreciable stenosis. ..... LEFT CCA. .. Mild soft plaque, minimal stenosis. Heddy  **This report has been created using voice recognition software. It may contain minor errors which are inherent in voice recognition technology. ** Final report electronically signed by Dr. Ashley Zavala on 11/23/2020 11:55 AM     Consultations during this hospital stay:-  [] NONE [] Cardiology  [] Nephrology  [] Hemo onco   [] GI   [] ID  [] Endocrine  [] Pulm    [] Neuro    [] Psych   [] Urology  [] ENT   [] G SURGERY   []Ortho    []CV surg    [] Palliative  [] Hospice [] Pain management   []    []TCU   [] PT/OT  OTHERS:-    Disposition: Transfer to [facility]  Condition at Discharge: Stable    Time Spent:- 45 minutes    Electronically signed by CORINA Smith on 11/26/2020 at 2:32 PM  Discharging Hospitalist

## 2020-11-26 NOTE — PROGRESS NOTES
Physician Progress Note      Veronica Huerta  [facility] #:                  [id number]  :                       1965  ADMIT DATE:       2020 2:26 PM  [address] DATE:        2020 6:07 PM  RESPONDING  PROVIDER #:        VIBHA Zacarias MD          QUERY TEXT:    Pt admitted with COVID-19 and pneumonia. Pt noted to have SpO2 96% on 6 L   (P/F ratio 204) on admission, dropped to 85% on 6L, respirations 16-20,   dyspnea on exertion/rest.  If possible, please respond below and document in   the progress notes and discharge summary if you are evaluating and/or treating   any of the following: The medical record reflects the following:  Risk Factors: COVID-19, pneumonia, pancytopenia  Clinical Indicators: SpO2 96% on 6 L (P/F ratio 204) on admission, dropped to   85% on 6L, respirations 16-20, dyspnea on exertion/rest; diminished lung   sounds  Treatment: high flow O2, Decadron, COVID-19 treatment, CXR, ABGs, isolation    Thank you! Lolita Higgins, RN, BSN, RHIT, CCDS, [facility]  RN Clinical   [phone]  Options provided:  -- Acute respiratory failure with hypoxia  -- Acute respiratory failure with hypercapnia  -- Hypoxia without acute respiratory failure  -- Other - I will add my own diagnosis  -- Disagree - Not applicable / Not valid  -- Disagree - Clinically unable to determine / Unknown  -- Refer to Clinical Documentation Reviewer    PROVIDER RESPONSE TEXT:    This patient is in acute respiratory failure with hypoxia. Query created by: Justice Cueva on 2020 2:45 PM      QUERY TEXT:    Attending,    Pt admitted with COVID-19 and pneumonia and noted to have T-max 100.6, SpO2   96% on 6 L, BP 97/64, WBC 2.6, 1.8; procalcitonin 0.20, 0.23; CRP 10.91; GREGOR. If possible, please respond below and document in the progress notes and   discharge summary if you are evaluating and/or treating:     The medical record reflects the following:  Risk Factors:

## 2020-11-28 LAB
CMV QUANT IU/ML: ABNORMAL IU/ML
CMV QUANT LOG IU/ML: 3.2 LOG IU/ML
CMV QUANTITATIVE INTERPRETATION: DETECTED
CMV QUANTITATIVE LOG COPY/ML: 3.5 LOG CPY/ML
CMVQ COPY/ML: ABNORMAL CPY/ML

## 2025-05-11 NOTE — PROGRESS NOTES
Blood gases obtained and RT notified   Relation Age of Onset    Heart Disease Mother     Diabetes Mother     Cancer Father     Diabetes Brother     Heart Disease Brother     Cancer Maternal Grandmother     Heart Disease Maternal Grandmother     Heart Disease Paternal Grandmother     Diabetes Sister      Social History   Substance Use Topics    Smoking status: Former Smoker     Packs/day: 0.50     Years: 20.00     Types: Cigarettes     Quit date: 1/1/1996    Smokeless tobacco: Never Used    Alcohol use 0.6 oz/week     1 Cans of beer per week      Comment: rarely      Current Outpatient Prescriptions   Medication Sig Dispense Refill    meloxicam (MOBIC) 15 MG tablet Take 1 tablet by mouth daily 90 tablet 3    lidocaine (XYLOCAINE) 2 % jelly Apply 3 times daily 85 g 5    cloNIDine (CATAPRES) 0.1 MG tablet take 1 tablet by mouth twice a day 60 tablet 3    losartan (COZAAR) 100 MG tablet TAKE ONE TABLET BY MOUTH EVERY DAY 90 tablet 3    acetaminophen (TYLENOL 8 HOUR) 650 MG extended release tablet Take 1,300 mg by mouth 2 times daily      metoprolol tartrate (LOPRESSOR) 50 MG tablet take 1 tablet by mouth twice a day 60 tablet 5    omeprazole (PRILOSEC) 40 MG delayed release capsule Take 1 capsule by mouth daily (Patient taking differently: Take 40 mg by mouth daily as needed ) 90 capsule 3    cetirizine (ZYRTEC) 10 MG tablet Take 1 tablet by mouth daily (Patient taking differently: Take 10 mg by mouth daily as needed ) 30 tablet 3    cyclobenzaprine (FLEXERIL) 10 MG tablet Take 1 tablet by mouth daily as needed for Muscle spasms       No current facility-administered medications for this visit.       Allergies   Allergen Reactions    Amlodipine Swelling     Health Maintenance   Topic Date Due    Hepatitis C screen  1965    HIV screen  04/14/1978    DTaP/Tdap/Td vaccine (1 - Tdap) 04/14/1982    Diabetes screen  04/14/2003    Shingles Vaccine (1 of 2 - 2 Dose Series) 04/14/2013    Flu vaccine (1) 09/01/2018    Potassium medications as prescribed.  Educational information on above diagnosis  provided per AVS.      Electronically signed by KASANDRA Brown CNP on 9/11/2018 at 2:26 PM

## (undated) DEVICE — SOLUTION IV 1000ML LAC RINGERS PH 6.5 INJ USP VIAFLX PLAS

## (undated) DEVICE — AGENT HEMSTAT W2XL4IN OXIDIZED REGENERATED CELOS ABSRB SFT

## (undated) DEVICE — HEAD POSITIONER, SURGICAL: Brand: DEROYAL

## (undated) DEVICE — 3M™ WARMING BLANKET, UPPER BODY, 10 PER CASE, 42268: Brand: BAIR HUGGER™

## (undated) DEVICE — CHLORAPREP 26ML ORANGE

## (undated) DEVICE — DRAIN SURG 10FR PVC TB W/ TRCR MID PERF NO RESVR HUBLESS

## (undated) DEVICE — SOLUTION IV 1000ML 0.9% SOD CHL PH 5 INJ USP VIAFLX PLAS

## (undated) DEVICE — SPONGE GZ W4XL4IN COT 12 PLY TYP VII WVN C FLD DSGN

## (undated) DEVICE — 4.0MM PRECISION ROUND

## (undated) DEVICE — GLOVE ORANGE PI 8 1/2   MSG9085

## (undated) DEVICE — Device

## (undated) DEVICE — SUTURE NONABSORBABLE MONOFILAMENT 5-0 C-1 1X24 IN PROLENE 8725H

## (undated) DEVICE — GOWN,SIRUS,NON REINFRCD,LARGE,SET IN SL: Brand: MEDLINE

## (undated) DEVICE — KIT EVAC 400CC PVC RADPQ Y CONN

## (undated) DEVICE — TUBING, SUCTION, 1/4" X 20', STRAIGHT: Brand: MEDLINE INDUSTRIES, INC.

## (undated) DEVICE — PAD,NON-ADHERENT,3X8,STERILE,LF,1/PK: Brand: MEDLINE

## (undated) DEVICE — SUTURE VCRL SZ 1 L18IN ABSRB VLT CTX L48MM 1/2 CIR J765D

## (undated) DEVICE — YANKAUER,BULB TIP,W/O VENT,RIGID,STERILE: Brand: MEDLINE

## (undated) DEVICE — THE MILL DISPOSABLE - MEDIUM

## (undated) DEVICE — INTEGUSEAL MICROBIAL SEALANT: Brand: AVANOS

## (undated) DEVICE — GLOVE ORANGE PI 8   MSG9080

## (undated) DEVICE — GLOVE ORANGE PI 7 1/2   MSG9075

## (undated) DEVICE — BLADE CLIPPER GEN PURP NS

## (undated) DEVICE — NEEDLE SPNL L3.5IN PNK HUB S STL REG WALL FIT STYL W/ QNCKE

## (undated) DEVICE — COVER ARMBRD W13XL28.5IN IMPERV BLU FOR OP RM

## (undated) DEVICE — BIPOLAR SEALER 23-112-1 AQM 6.0: Brand: AQUAMANTYS ®

## (undated) DEVICE — PROBE STIM 3 MM FOR PEDCL SCREW DISP

## (undated) DEVICE — CODMAN® SURGICAL PATTIES 1" X 1" (2.54CM X 2.54CM): Brand: CODMAN®